# Patient Record
Sex: MALE | Race: WHITE | HISPANIC OR LATINO
[De-identification: names, ages, dates, MRNs, and addresses within clinical notes are randomized per-mention and may not be internally consistent; named-entity substitution may affect disease eponyms.]

---

## 2018-07-24 ENCOUNTER — APPOINTMENT (OUTPATIENT)
Dept: FAMILY MEDICINE | Facility: CLINIC | Age: 83
End: 2018-07-24
Payer: MEDICARE

## 2018-07-24 PROCEDURE — 99203 OFFICE O/P NEW LOW 30 MIN: CPT

## 2018-07-24 NOTE — ASSESSMENT
[FreeTextEntry1] : Patient is sent for laboratory work a chest x-ray and also Sandi given referral to Gen. surgery we will ask him to return and bring in all of his medications at that time

## 2018-07-24 NOTE — PHYSICAL EXAM
[No Acute Distress] : no acute distress [Well Nourished] : well nourished [Well Developed] : well developed [Well-Appearing] : well-appearing [Normal Sclera/Conjunctiva] : normal sclera/conjunctiva [Normal Outer Ear/Nose] : the outer ears and nose were normal in appearance [Normal Oropharynx] : the oropharynx was normal [No JVD] : no jugular venous distention [Supple] : supple [No Lymphadenopathy] : no lymphadenopathy [Thyroid Normal, No Nodules] : the thyroid was normal and there were no nodules present [No Respiratory Distress] : no respiratory distress  [No Accessory Muscle Use] : no accessory muscle use [Normal Rate] : normal rate  [Regular Rhythm] : with a regular rhythm [No Edema] : there was no peripheral edema [Soft] : abdomen soft [Non Tender] : non-tender [Non-distended] : non-distended [No HSM] : no HSM [No Hernias] : no hernias [Normal Supraclavicular Nodes] : no supraclavicular lymphadenopathy [Normal Posterior Cervical Nodes] : no posterior cervical lymphadenopathy [Normal Anterior Cervical Nodes] : no anterior cervical lymphadenopathy [No Spinal Tenderness] : no spinal tenderness [de-identified] : Rounded raised red apparent skin tumor supra-clavicular left shoulder area

## 2018-07-24 NOTE — HISTORY OF PRESENT ILLNESS
[FreeTextEntry1] : skin lesion L shoulder [de-identified] : Patient is here today because of a skin lesion of his left shoulder. He is a Citizen of [place]. 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 According to his  he takes many medications but they do not know the names and has not brought them in today. Basically he feels well and review of systems is unremarkable

## 2018-07-26 ENCOUNTER — FORM ENCOUNTER (OUTPATIENT)
Age: 83
End: 2018-07-26

## 2018-07-27 ENCOUNTER — OUTPATIENT (OUTPATIENT)
Dept: OUTPATIENT SERVICES | Facility: HOSPITAL | Age: 83
LOS: 1 days | End: 2018-07-27
Payer: COMMERCIAL

## 2018-07-27 ENCOUNTER — APPOINTMENT (OUTPATIENT)
Dept: RADIOLOGY | Facility: HOSPITAL | Age: 83
End: 2018-07-27
Payer: MEDICARE

## 2018-07-27 DIAGNOSIS — L98.9 DISORDER OF THE SKIN AND SUBCUTANEOUS TISSUE, UNSPECIFIED: ICD-10-CM

## 2018-07-27 PROCEDURE — 71046 X-RAY EXAM CHEST 2 VIEWS: CPT

## 2018-07-27 PROCEDURE — 71046 X-RAY EXAM CHEST 2 VIEWS: CPT | Mod: 26

## 2018-08-04 LAB
ALBUMIN SERPL ELPH-MCNC: 3.6 G/DL
ALP BLD-CCNC: 81 U/L
ALT SERPL-CCNC: 14 U/L
ANION GAP SERPL CALC-SCNC: 12 MMOL/L
APPEARANCE: CLEAR
AST SERPL-CCNC: 23 U/L
BASOPHILS # BLD AUTO: 0.07 K/UL
BASOPHILS NFR BLD AUTO: 1.1 %
BILIRUB SERPL-MCNC: 0.3 MG/DL
BILIRUBIN URINE: NEGATIVE
BLOOD URINE: NEGATIVE
BUN SERPL-MCNC: 21 MG/DL
CALCIUM SERPL-MCNC: 9 MG/DL
CHLORIDE SERPL-SCNC: 103 MMOL/L
CHOLEST SERPL-MCNC: 198 MG/DL
CHOLEST/HDLC SERPL: 3.9 RATIO
CO2 SERPL-SCNC: 29 MMOL/L
COLOR: YELLOW
CREAT SERPL-MCNC: 0.93 MG/DL
EOSINOPHIL # BLD AUTO: 0.2 K/UL
EOSINOPHIL NFR BLD AUTO: 3.2 %
GLUCOSE QUALITATIVE U: NEGATIVE MG/DL
GLUCOSE SERPL-MCNC: 94 MG/DL
HCT VFR BLD CALC: 41.6 %
HDLC SERPL-MCNC: 51 MG/DL
HGB BLD-MCNC: 13.4 G/DL
IMM GRANULOCYTES NFR BLD AUTO: 0.2 %
KETONES URINE: NEGATIVE
LDLC SERPL CALC-MCNC: 126 MG/DL
LEUKOCYTE ESTERASE URINE: NEGATIVE
LYMPHOCYTES # BLD AUTO: 2.71 K/UL
LYMPHOCYTES NFR BLD AUTO: 42.9 %
MAN DIFF?: NORMAL
MCHC RBC-ENTMCNC: 29.8 PG
MCHC RBC-ENTMCNC: 32.2 GM/DL
MCV RBC AUTO: 92.4 FL
MONOCYTES # BLD AUTO: 0.65 K/UL
MONOCYTES NFR BLD AUTO: 10.3 %
NEUTROPHILS # BLD AUTO: 2.67 K/UL
NEUTROPHILS NFR BLD AUTO: 42.3 %
NITRITE URINE: NEGATIVE
PH URINE: 5.5
PLATELET # BLD AUTO: 250 K/UL
POTASSIUM SERPL-SCNC: 4.4 MMOL/L
PROT SERPL-MCNC: 7.4 G/DL
PROTEIN URINE: NEGATIVE MG/DL
RBC # BLD: 4.5 M/UL
RBC # FLD: 13.1 %
SODIUM SERPL-SCNC: 144 MMOL/L
SPECIFIC GRAVITY URINE: 1.03
TRIGL SERPL-MCNC: 105 MG/DL
UROBILINOGEN URINE: NEGATIVE MG/DL
WBC # FLD AUTO: 6.31 K/UL

## 2018-08-08 ENCOUNTER — APPOINTMENT (OUTPATIENT)
Dept: SURGERY | Facility: CLINIC | Age: 83
End: 2018-08-08
Payer: MEDICARE

## 2018-08-08 VITALS — WEIGHT: 134 LBS | HEIGHT: 62 IN | BODY MASS INDEX: 24.66 KG/M2

## 2018-08-08 PROCEDURE — 99202 OFFICE O/P NEW SF 15 MIN: CPT

## 2018-08-13 ENCOUNTER — APPOINTMENT (OUTPATIENT)
Dept: SURGERY | Facility: CLINIC | Age: 83
End: 2018-08-13
Payer: MEDICARE

## 2018-08-13 ENCOUNTER — LABORATORY RESULT (OUTPATIENT)
Age: 83
End: 2018-08-13

## 2018-08-13 PROCEDURE — 11602 EXC TR-EXT MAL+MARG 1.1-2 CM: CPT

## 2018-08-14 ENCOUNTER — APPOINTMENT (OUTPATIENT)
Dept: FAMILY MEDICINE | Facility: CLINIC | Age: 83
End: 2018-08-14
Payer: MEDICARE

## 2018-08-14 VITALS
HEART RATE: 67 BPM | DIASTOLIC BLOOD PRESSURE: 83 MMHG | BODY MASS INDEX: 23.37 KG/M2 | WEIGHT: 127 LBS | HEIGHT: 62 IN | SYSTOLIC BLOOD PRESSURE: 155 MMHG | OXYGEN SATURATION: 96 % | RESPIRATION RATE: 12 BRPM

## 2018-08-14 PROCEDURE — 99212 OFFICE O/P EST SF 10 MIN: CPT

## 2018-08-14 NOTE — PHYSICAL EXAM
[No Acute Distress] : no acute distress [Well Nourished] : well nourished [Well Developed] : well developed [Well-Appearing] : well-appearing [No Respiratory Distress] : no respiratory distress  [Clear to Auscultation] : lungs were clear to auscultation bilaterally [Normal Rate] : normal rate  [Regular Rhythm] : with a regular rhythm [No Edema] : there was no peripheral edema [de-identified] : biopsy site healing well

## 2018-08-14 NOTE — HISTORY OF PRESENT ILLNESS
[FreeTextEntry1] : skin lesion L shoulder [de-identified] : He is here today in followup on a skin lesion on the left shoulder this was biopsied day yesterday by Dr. Combs with the results pending in the next several days patient has had no fever chills or systemic symptoms and no major pain in the area during work that was done previously was well within acceptable levels review of systems is unremarkable

## 2018-08-17 ENCOUNTER — APPOINTMENT (OUTPATIENT)
Dept: SURGERY | Facility: CLINIC | Age: 83
End: 2018-08-17
Payer: MEDICARE

## 2018-08-17 PROCEDURE — 99024 POSTOP FOLLOW-UP VISIT: CPT

## 2018-08-27 ENCOUNTER — OUTPATIENT (OUTPATIENT)
Dept: OUTPATIENT SERVICES | Facility: HOSPITAL | Age: 83
LOS: 1 days | End: 2018-08-27
Payer: COMMERCIAL

## 2018-08-27 VITALS
WEIGHT: 124.78 LBS | TEMPERATURE: 98 F | OXYGEN SATURATION: 95 % | RESPIRATION RATE: 16 BRPM | DIASTOLIC BLOOD PRESSURE: 68 MMHG | SYSTOLIC BLOOD PRESSURE: 151 MMHG | HEIGHT: 61 IN | HEART RATE: 65 BPM

## 2018-08-27 VITALS — WEIGHT: 124.78 LBS | HEIGHT: 61 IN

## 2018-08-27 DIAGNOSIS — Z01.818 ENCOUNTER FOR OTHER PREPROCEDURAL EXAMINATION: ICD-10-CM

## 2018-08-27 DIAGNOSIS — G89.3 NEOPLASM RELATED PAIN (ACUTE) (CHRONIC): ICD-10-CM

## 2018-08-27 DIAGNOSIS — C44.91 BASAL CELL CARCINOMA OF SKIN, UNSPECIFIED: ICD-10-CM

## 2018-08-27 DIAGNOSIS — Z98.890 OTHER SPECIFIED POSTPROCEDURAL STATES: Chronic | ICD-10-CM

## 2018-08-27 DIAGNOSIS — D49.2 NEOPLASM OF UNSPECIFIED BEHAVIOR OF BONE, SOFT TISSUE, AND SKIN: ICD-10-CM

## 2018-08-27 PROCEDURE — G0463: CPT

## 2018-08-27 PROCEDURE — 93010 ELECTROCARDIOGRAM REPORT: CPT | Mod: NC

## 2018-08-27 PROCEDURE — 93005 ELECTROCARDIOGRAM TRACING: CPT

## 2018-08-27 NOTE — H&P PST ADULT - PROBLEM SELECTOR PLAN 1
wide excision basal cell carcinoma left shoulder. medical clearance requested. pepcid and surgical wash instructions reviewed and verbalized understanding. stop MVI

## 2018-08-27 NOTE — H&P PST ADULT - NSANTHOSAYNRD_GEN_A_CORE
neck 15.5 inches neck 15.5 inches/No. ELÍAS screening performed.  STOP BANG Legend: 0-2 = LOW Risk; 3-4 = INTERMEDIATE Risk; 5-8 = HIGH Risk

## 2018-08-27 NOTE — H&P PST ADULT - RS GEN PE MLT RESP DETAILS PC
respirations non-labored/airway patent/good air movement/no chest wall tenderness/no wheezes/breath sounds equal/no rales/no rhonchi/clear to auscultation bilaterally

## 2018-08-27 NOTE — H&P PST ADULT - HISTORY OF PRESENT ILLNESS
85 yo male presents 85 yo male presents with 1.5 month history of left shoulder lesion which was biopsied 1 week ago and revealed basal cell carcinoma. Denies pain but reports minimal serous drainage.

## 2018-08-27 NOTE — H&P PST ADULT - PMH
Basal cell carcinoma  left shoulder Anxiety    Basal cell carcinoma  left shoulder  BPH (benign prostatic hyperplasia)    Elevated blood sugar  secondary to steroids  Incontinence    Language barrier    Memory loss, short term    Nocturia  x1-2  Pancreatic mass  benign 2011  Pancreatitis  2011  Poor historian  both patient and wife

## 2018-08-29 ENCOUNTER — APPOINTMENT (OUTPATIENT)
Dept: FAMILY MEDICINE | Facility: CLINIC | Age: 83
End: 2018-08-29

## 2018-08-29 PROBLEM — K85.90 ACUTE PANCREATITIS WITHOUT NECROSIS OR INFECTION, UNSPECIFIED: Chronic | Status: ACTIVE | Noted: 2018-08-27

## 2018-08-29 PROBLEM — F41.9 ANXIETY DISORDER, UNSPECIFIED: Chronic | Status: ACTIVE | Noted: 2018-08-27

## 2018-08-29 PROBLEM — C44.91 BASAL CELL CARCINOMA OF SKIN, UNSPECIFIED: Chronic | Status: ACTIVE | Noted: 2018-08-27

## 2018-08-29 PROBLEM — R32 UNSPECIFIED URINARY INCONTINENCE: Chronic | Status: ACTIVE | Noted: 2018-08-27

## 2018-08-29 PROBLEM — R35.1 NOCTURIA: Chronic | Status: ACTIVE | Noted: 2018-08-27

## 2018-08-29 PROBLEM — K86.9 DISEASE OF PANCREAS, UNSPECIFIED: Chronic | Status: ACTIVE | Noted: 2018-08-27

## 2018-08-29 PROBLEM — R41.3 OTHER AMNESIA: Chronic | Status: ACTIVE | Noted: 2018-08-27

## 2018-08-29 PROBLEM — Z78.9 OTHER SPECIFIED HEALTH STATUS: Chronic | Status: ACTIVE | Noted: 2018-08-27

## 2018-08-29 PROBLEM — R73.9 HYPERGLYCEMIA, UNSPECIFIED: Chronic | Status: ACTIVE | Noted: 2018-08-27

## 2018-08-29 PROBLEM — N40.0 BENIGN PROSTATIC HYPERPLASIA WITHOUT LOWER URINARY TRACT SYMPTOMS: Chronic | Status: ACTIVE | Noted: 2018-08-27

## 2018-09-04 ENCOUNTER — APPOINTMENT (OUTPATIENT)
Dept: FAMILY MEDICINE | Facility: CLINIC | Age: 83
End: 2018-09-04
Payer: MEDICARE

## 2018-09-04 VITALS
OXYGEN SATURATION: 97 % | WEIGHT: 126 LBS | HEART RATE: 76 BPM | BODY MASS INDEX: 23.19 KG/M2 | DIASTOLIC BLOOD PRESSURE: 70 MMHG | RESPIRATION RATE: 12 BRPM | SYSTOLIC BLOOD PRESSURE: 143 MMHG | HEIGHT: 62 IN

## 2018-09-04 DIAGNOSIS — Z84.89 FAMILY HISTORY OF OTHER SPECIFIED CONDITIONS: ICD-10-CM

## 2018-09-04 PROCEDURE — 99214 OFFICE O/P EST MOD 30 MIN: CPT

## 2018-09-04 NOTE — PHYSICAL EXAM
[No Acute Distress] : no acute distress [Well Nourished] : well nourished [Well Developed] : well developed [Well-Appearing] : well-appearing [Normal Sclera/Conjunctiva] : normal sclera/conjunctiva [PERRL] : pupils equal round and reactive to light [EOMI] : extraocular movements intact [Normal Outer Ear/Nose] : the outer ears and nose were normal in appearance [Normal Oropharynx] : the oropharynx was normal [No JVD] : no jugular venous distention [Supple] : supple [No Lymphadenopathy] : no lymphadenopathy [Thyroid Normal, No Nodules] : the thyroid was normal and there were no nodules present [No Respiratory Distress] : no respiratory distress  [Clear to Auscultation] : lungs were clear to auscultation bilaterally [No Accessory Muscle Use] : no accessory muscle use [Normal Rate] : normal rate  [Regular Rhythm] : with a regular rhythm [No Murmur] : no murmur heard [No Edema] : there was no peripheral edema [Soft] : abdomen soft [Non Tender] : non-tender [Non-distended] : non-distended [No Masses] : no abdominal mass palpated [No HSM] : no HSM [Normal Bowel Sounds] : normal bowel sounds [Normal Supraclavicular Nodes] : no supraclavicular lymphadenopathy [Normal Posterior Cervical Nodes] : no posterior cervical lymphadenopathy [Normal Anterior Cervical Nodes] : no anterior cervical lymphadenopathy [No CVA Tenderness] : no CVA  tenderness [No Spinal Tenderness] : no spinal tenderness [No Joint Swelling] : no joint swelling [de-identified] : He continues to areas of rash one below the left nipple somewhat elongated and streaky with several small vesicles/pustules. This is nonpainful. Patient also has a proximately 5 cm diameter black she is slightly red area and is left on his left posterior thigh this is not painful or warm. E. chest lesion could be very limited early zoster although he can not in any way be sure there is in the blotch on the thigh could be erythema chronicum migrans although is not typical but does not have central clearing and there is no history of a tick bite or a tick being removed from the patient although he does work in his yard.

## 2018-09-04 NOTE — HISTORY OF PRESENT ILLNESS
[No Pertinent Cardiac History] : no history of aortic stenosis, atrial fibrillation, coronary artery disease, recent myocardial infarction, or implantable device/pacemaker [No Pertinent Pulmonary History] : no history of asthma, COPD, sleep apnea, or smoking [No Adverse Anesthesia Reaction] : no adverse anesthesia reaction in self or family member [Chronic Anticoagulation] : no chronic anticoagulation [Chronic Kidney Disease] : no chronic kidney disease [Diabetes] : no diabetes [(Patient denies any chest pain, claudication, dyspnea on exertion, orthopnea, palpitations or syncope)] : Patient denies any chest pain, claudication, dyspnea on exertion, orthopnea, palpitations or syncope [Moderate (4-6 METs)] : Moderate (4-6 METs) [FreeTextEntry1] : wide excision basal cell [FreeTextEntry2] : 9/6/2018 [FreeTextEntry3] : Garret [FreeTextEntry4] : Patient is here for preop evaluation for a wide excision of a previously biopsied basal cell carcinoma on his left shoulder. The patient's past medical history is unremarkable except for pancreatitis for which he underwent endoscopy otherwise he's had no surgeries recently he has felt well review of systems being negative for chest pain shortness of breath palpitations lightheadedness fainting spells abdominal pain etc. however the patient did contract a red on the left side of his chest about 4 cm in length with several vesicles/pustules. He also has a red blotch on the right lateral and posterior thigh about 7 cm in diameter none of these lesions are painful. There have been no history of removal of ticks or tick bites although the patient has mild his lawn in the past and wonders  if he might have mild poison ivy. No fever chills no generalized aches or pains. [FreeTextEntry7] : EKG- non specific T wave abnormality. Left Axis deviation. No acute ST elevation or depression. pt has no cardiac symptoms

## 2018-09-04 NOTE — ASSESSMENT
[High Risk Surgery - Intraperitoneal, Intrathoracic or Supringuinal Vascular Procedures] : High Risk Surgery - Intraperitoneal, Intrathoracic or Supringuinal Vascular Procedures - No (0) [Ischemic Heart Disease] : Ischemic Heart Disease - No (0) [Congestive Heart Failure] : Congestive Heart Failure - No (0) [Prior Cerebrovascular Accident or TIA] : Prior Cerebrovascular Accident or TIA - No (0) [Creatinine >= 2mg/dL (1 Point)] : Creatinine >= 2mg/dL - No (0) [Insulin-dependent Diabetic (1 Point)] : Insulin-dependent Diabetic - No (0) [0] : 0 , RCRI Class: I, Risk of Post-Op Cardiac Complications: 0.4%, Procedure Risk: Low-Risk [No Further Testing Recommended] : no further testing recommended [FreeTextEntry4] : From a cardiopulmonary point of view the patient is an acceptable candidate for proposed skin excision however the proximity of the chest rash and possible infection to the area of surgery will be relayed to the surgeon patient will be reevaluated with regard to the skin rash which is likely a contact dermatitis but has a large differential. Review of laboratory work and EKG reveals no contraindication to surgery and no need for further evaluation prior to surgery [FreeTextEntry2] : na

## 2018-09-05 ENCOUNTER — TRANSCRIPTION ENCOUNTER (OUTPATIENT)
Age: 83
End: 2018-09-05

## 2018-09-05 ENCOUNTER — APPOINTMENT (OUTPATIENT)
Dept: SURGERY | Facility: CLINIC | Age: 83
End: 2018-09-05
Payer: MEDICARE

## 2018-09-05 DIAGNOSIS — L98.9 DISORDER OF THE SKIN AND SUBCUTANEOUS TISSUE, UNSPECIFIED: ICD-10-CM

## 2018-09-05 PROCEDURE — 99213 OFFICE O/P EST LOW 20 MIN: CPT | Mod: 24,57

## 2018-09-06 ENCOUNTER — RESULT REVIEW (OUTPATIENT)
Age: 83
End: 2018-09-06

## 2018-09-06 ENCOUNTER — OUTPATIENT (OUTPATIENT)
Dept: OUTPATIENT SERVICES | Facility: HOSPITAL | Age: 83
LOS: 1 days | End: 2018-09-06
Payer: COMMERCIAL

## 2018-09-06 VITALS
RESPIRATION RATE: 16 BRPM | TEMPERATURE: 98 F | SYSTOLIC BLOOD PRESSURE: 162 MMHG | HEIGHT: 61 IN | HEART RATE: 68 BPM | WEIGHT: 124.78 LBS | OXYGEN SATURATION: 96 % | DIASTOLIC BLOOD PRESSURE: 71 MMHG

## 2018-09-06 VITALS
TEMPERATURE: 98 F | HEART RATE: 81 BPM | DIASTOLIC BLOOD PRESSURE: 73 MMHG | OXYGEN SATURATION: 96 % | RESPIRATION RATE: 14 BRPM | SYSTOLIC BLOOD PRESSURE: 133 MMHG

## 2018-09-06 DIAGNOSIS — Z98.890 OTHER SPECIFIED POSTPROCEDURAL STATES: Chronic | ICD-10-CM

## 2018-09-06 DIAGNOSIS — G89.3 NEOPLASM RELATED PAIN (ACUTE) (CHRONIC): ICD-10-CM

## 2018-09-06 DIAGNOSIS — D49.2 NEOPLASM OF UNSPECIFIED BEHAVIOR OF BONE, SOFT TISSUE, AND SKIN: ICD-10-CM

## 2018-09-06 PROBLEM — L98.9 SKIN LESION: Status: ACTIVE | Noted: 2018-07-24

## 2018-09-06 PROCEDURE — 88305 TISSUE EXAM BY PATHOLOGIST: CPT | Mod: 26

## 2018-09-06 PROCEDURE — 88305 TISSUE EXAM BY PATHOLOGIST: CPT

## 2018-09-06 PROCEDURE — 11606 EXC TR-EXT MAL+MARG >4 CM: CPT | Mod: LT

## 2018-09-06 PROCEDURE — ZZZZZ: CPT

## 2018-09-06 PROCEDURE — 11604 EXC TR-EXT MAL+MARG 3.1-4 CM: CPT

## 2018-09-06 PROCEDURE — 12042 INTMD RPR N-HF/GENIT2.6-7.5: CPT

## 2018-09-06 RX ORDER — SODIUM CHLORIDE 9 MG/ML
1000 INJECTION, SOLUTION INTRAVENOUS
Qty: 0 | Refills: 0 | Status: DISCONTINUED | OUTPATIENT
Start: 2018-09-06 | End: 2018-09-06

## 2018-09-06 RX ORDER — ACETAMINOPHEN 500 MG
650 TABLET ORAL EVERY 6 HOURS
Qty: 0 | Refills: 0 | Status: DISCONTINUED | OUTPATIENT
Start: 2018-09-06 | End: 2018-09-21

## 2018-09-06 RX ORDER — HYDROMORPHONE HYDROCHLORIDE 2 MG/ML
0.5 INJECTION INTRAMUSCULAR; INTRAVENOUS; SUBCUTANEOUS
Qty: 0 | Refills: 0 | Status: DISCONTINUED | OUTPATIENT
Start: 2018-09-06 | End: 2018-09-06

## 2018-09-06 RX ORDER — ONDANSETRON 8 MG/1
4 TABLET, FILM COATED ORAL ONCE
Qty: 0 | Refills: 0 | Status: DISCONTINUED | OUTPATIENT
Start: 2018-09-06 | End: 2018-09-06

## 2018-09-06 RX ORDER — OXYCODONE AND ACETAMINOPHEN 5; 325 MG/1; MG/1
1 TABLET ORAL EVERY 6 HOURS
Qty: 0 | Refills: 0 | Status: DISCONTINUED | OUTPATIENT
Start: 2018-09-06 | End: 2018-09-06

## 2018-09-06 NOTE — ASU PATIENT PROFILE, ADULT - PMH
Anxiety    Basal cell carcinoma  left shoulder  BPH (benign prostatic hyperplasia)    Elevated blood sugar  secondary to steroids  Incontinence    Language barrier    Memory loss, short term    Nocturia  x1-2  Pancreatic mass  benign 2011  Pancreatitis  2011  Poor historian  both patient and wife

## 2018-09-06 NOTE — ASU DISCHARGE PLAN (ADULT/PEDIATRIC). - ITEMS TO FOLLOWUP WITH YOUR PHYSICIAN'S
see above physician notification. reviewed with patient and spouse verbalizes understanding of above information.

## 2018-09-06 NOTE — BRIEF OPERATIVE NOTE - PROCEDURE
<<-----Click on this checkbox to enter Procedure Excision  09/06/2018  basal cell carcinoma left post shoulder  Active  [facility]

## 2018-09-06 NOTE — ASU DISCHARGE PLAN (ADULT/PEDIATRIC). - MEDICATION SUMMARY - MEDICATIONS TO TAKE
I will START or STAY ON the medications listed below when I get home from the hospital:    predniSONE 2.5 mg oral tablet  -- 1 tab(s) by mouth once a day  -- Indication: For home    Percocet 5/325 oral tablet  -- 1 tab(s) by mouth every 8 hours -for moderate pain - for severe pain MDD:3   -- Caution federal law prohibits the transfer of this drug to any person other  than the person for whom it was prescribed.  May cause drowsiness.  Alcohol may intensify this effect.  Use care when operating dangerous machinery.  This prescription cannot be refilled.  This product contains acetaminophen.  Do not use  with any other product containing acetaminophen to prevent possible liver damage.  Using more of this medication than prescribed may cause serious breathing problems.    -- Indication: For post op pain    sertraline 25 mg oral tablet  -- 1/2 tab(s) by mouth once a day  -- Indication: For home    Creon 24,000 units oral delayed release capsule  -- 3 cap(s) by mouth 3 times a day  -- Indication: For home    Multiple Vitamins oral tablet  -- 1 tab(s) by mouth once a day  -- Indication: For home    Calcium 600+D oral tablet  -- 1 tab(s) by mouth once a day  -- Indication: For home

## 2018-09-06 NOTE — ASU DISCHARGE PLAN (ADULT/PEDIATRIC). - NOTIFY
Fever greater than 101/Bleeding that does not stop Persistent Nausea and Vomiting/Pain not relieved by Medications/Fever greater than 101/Bleeding that does not stop/Inability to Tolerate Liquids or Foods

## 2018-09-10 LAB — SURGICAL PATHOLOGY STUDY: SIGNIFICANT CHANGE UP

## 2018-09-12 ENCOUNTER — APPOINTMENT (OUTPATIENT)
Dept: SURGERY | Facility: CLINIC | Age: 83
End: 2018-09-12
Payer: MEDICARE

## 2018-09-12 DIAGNOSIS — C44.91 BASAL CELL CARCINOMA OF SKIN, UNSPECIFIED: ICD-10-CM

## 2018-09-12 PROCEDURE — 99024 POSTOP FOLLOW-UP VISIT: CPT

## 2018-09-12 RX ORDER — MUPIROCIN 20 MG/G
2 OINTMENT TOPICAL
Qty: 22 | Refills: 0 | Status: ACTIVE | COMMUNITY
Start: 2018-07-13

## 2018-10-09 ENCOUNTER — APPOINTMENT (OUTPATIENT)
Dept: FAMILY MEDICINE | Facility: CLINIC | Age: 83
End: 2018-10-09

## 2018-11-06 ENCOUNTER — APPOINTMENT (OUTPATIENT)
Dept: FAMILY MEDICINE | Facility: CLINIC | Age: 83
End: 2018-11-06
Payer: MEDICARE

## 2018-11-06 VITALS
BODY MASS INDEX: 26.13 KG/M2 | TEMPERATURE: 98.3 F | HEART RATE: 69 BPM | RESPIRATION RATE: 12 BRPM | DIASTOLIC BLOOD PRESSURE: 49 MMHG | OXYGEN SATURATION: 98 % | HEIGHT: 62 IN | WEIGHT: 142 LBS | SYSTOLIC BLOOD PRESSURE: 149 MMHG

## 2018-11-06 DIAGNOSIS — Z23 ENCOUNTER FOR IMMUNIZATION: ICD-10-CM

## 2018-11-06 LAB
ALBUMIN SERPL ELPH-MCNC: 3.9 G/DL
ALP BLD-CCNC: 84 U/L
ALT SERPL-CCNC: 18 U/L
ANION GAP SERPL CALC-SCNC: 11 MMOL/L
AST SERPL-CCNC: 21 U/L
BASOPHILS # BLD AUTO: 0.04 K/UL
BASOPHILS NFR BLD AUTO: 0.7 %
BILIRUB SERPL-MCNC: 0.3 MG/DL
BUN SERPL-MCNC: 28 MG/DL
CALCIUM SERPL-MCNC: 9 MG/DL
CHLORIDE SERPL-SCNC: 105 MMOL/L
CO2 SERPL-SCNC: 27 MMOL/L
CREAT SERPL-MCNC: 1.08 MG/DL
EOSINOPHIL # BLD AUTO: 0.19 K/UL
EOSINOPHIL NFR BLD AUTO: 3.1 %
ERYTHROCYTE [SEDIMENTATION RATE] IN BLOOD BY WESTERGREN METHOD: 34 MM/HR
GLUCOSE SERPL-MCNC: 88 MG/DL
HCT VFR BLD CALC: 40.3 %
HGB BLD-MCNC: 13.2 G/DL
IMM GRANULOCYTES NFR BLD AUTO: 0.2 %
LYMPHOCYTES # BLD AUTO: 2.16 K/UL
LYMPHOCYTES NFR BLD AUTO: 35.4 %
MAN DIFF?: NORMAL
MCHC RBC-ENTMCNC: 30.1 PG
MCHC RBC-ENTMCNC: 32.8 GM/DL
MCV RBC AUTO: 92 FL
MONOCYTES # BLD AUTO: 0.59 K/UL
MONOCYTES NFR BLD AUTO: 9.7 %
NEUTROPHILS # BLD AUTO: 3.11 K/UL
NEUTROPHILS NFR BLD AUTO: 50.9 %
PLATELET # BLD AUTO: 267 K/UL
POTASSIUM SERPL-SCNC: 4.8 MMOL/L
PROT SERPL-MCNC: 7 G/DL
RBC # BLD: 4.38 M/UL
RBC # FLD: 13.6 %
SODIUM SERPL-SCNC: 143 MMOL/L
WBC # FLD AUTO: 6.1 K/UL

## 2018-11-06 PROCEDURE — 99214 OFFICE O/P EST MOD 30 MIN: CPT | Mod: 25

## 2018-11-06 PROCEDURE — 90686 IIV4 VACC NO PRSV 0.5 ML IM: CPT

## 2018-11-06 PROCEDURE — G0008: CPT

## 2018-11-06 NOTE — REVIEW OF SYSTEMS
[Unsteady Walk] : ataxia [Fever] : no fever [Chills] : no chills [Fatigue] : no fatigue [Night Sweats] : no night sweats [Recent Change In Weight] : ~T no recent weight change [Discharge] : no discharge [Vision Problems] : no vision problems [Sore Throat] : no sore throat [Chest Pain] : no chest pain [Palpitations] : no palpitations [Orthopena] : no orthopnea [Paroysmal Nocturnal Dyspnea] : no paroysmal nocturnal dyspnea [Shortness Of Breath] : no shortness of breath [Wheezing] : no wheezing [Cough] : no cough [Dyspnea on Exertion] : not dyspnea on exertion [Abdominal Pain] : no abdominal pain [Nausea] : no nausea [Constipation] : no constipation [Vomiting] : no vomiting [Heartburn] : no heartburn [Melena] : no melena [Dysuria] : no dysuria [Incontinence] : no incontinence [Hesitancy] : no hesitancy [Nocturia] : no nocturia [Hematuria] : no hematuria [Frequency] : no frequency [Joint Pain] : no joint pain [Joint Stiffness] : no joint stiffness [Back Pain] : no back pain [Joint Swelling] : no joint swelling [Headache] : no headache [Dizziness] : no dizziness [Fainting] : no fainting [Confusion] : no confusion [Memory Loss] : no memory loss

## 2018-11-06 NOTE — PHYSICAL EXAM
[No Acute Distress] : no acute distress [Well Nourished] : well nourished [Well Developed] : well developed [Well-Appearing] : well-appearing [Normal Sclera/Conjunctiva] : normal sclera/conjunctiva [PERRL] : pupils equal round and reactive to light [Normal Oropharynx] : the oropharynx was normal [No JVD] : no jugular venous distention [No Lymphadenopathy] : no lymphadenopathy [Thyroid Normal, No Nodules] : the thyroid was normal and there were no nodules present [No Respiratory Distress] : no respiratory distress  [Clear to Auscultation] : lungs were clear to auscultation bilaterally [No Accessory Muscle Use] : no accessory muscle use [Normal Rate] : normal rate  [Regular Rhythm] : with a regular rhythm [No Murmur] : no murmur heard [No Abdominal Bruit] : a ~M bruit was not heard ~T in the abdomen [No Edema] : there was no peripheral edema [No Extremity Clubbing/Cyanosis] : no extremity clubbing/cyanosis [Soft] : abdomen soft [Non Tender] : non-tender [No HSM] : no HSM [Normal Bowel Sounds] : normal bowel sounds [No CVA Tenderness] : no CVA  tenderness [No Spinal Tenderness] : no spinal tenderness [de-identified] : gait slightly unsteady, get up and go normal

## 2018-11-06 NOTE — ASSESSMENT
[FreeTextEntry1] : Patient here with frequent falls and some mental status changes currently he seems alert and neurological exam has no focal deficits he has brisk reflexes and good muscle strength he ambulates reasonably well for his age he will be given a flu shot will be sent for CAT scan of the head with contrast also laboratory work to be followed up in a week to 10 days after this visit

## 2018-11-06 NOTE — HISTORY OF PRESENT ILLNESS
[FreeTextEntry1] : difficulty walking [de-identified] : He is here today because over the past month he has had several falls and he seems to have some difficulty walking also does not follow along in conversations as well as he used to he denies any headaches or weakness or slurred speech there is a past history of depression he is on Zoloft review of systems is otherwise unremarkable

## 2018-11-07 LAB
TSH SERPL-ACNC: 1.57 UIU/ML
VIT B12 SERPL-MCNC: 433 PG/ML

## 2019-04-02 ENCOUNTER — APPOINTMENT (OUTPATIENT)
Dept: FAMILY MEDICINE | Facility: CLINIC | Age: 84
End: 2019-04-02
Payer: MEDICARE

## 2019-04-02 VITALS
BODY MASS INDEX: 24.11 KG/M2 | DIASTOLIC BLOOD PRESSURE: 70 MMHG | HEIGHT: 62 IN | SYSTOLIC BLOOD PRESSURE: 126 MMHG | WEIGHT: 131 LBS | OXYGEN SATURATION: 98 % | HEART RATE: 68 BPM | RESPIRATION RATE: 12 BRPM

## 2019-04-02 PROCEDURE — 99214 OFFICE O/P EST MOD 30 MIN: CPT

## 2019-04-02 RX ORDER — CEFADROXIL 500 MG/1
500 CAPSULE ORAL
Qty: 14 | Refills: 0 | Status: DISCONTINUED | COMMUNITY
Start: 2018-07-09 | End: 2019-04-02

## 2019-04-02 RX ORDER — OXYCODONE AND ACETAMINOPHEN 5; 325 MG/1; MG/1
5-325 TABLET ORAL
Qty: 6 | Refills: 0 | Status: DISCONTINUED | COMMUNITY
Start: 2018-09-06 | End: 2019-04-02

## 2019-04-02 NOTE — REVIEW OF SYSTEMS
[Negative] : Musculoskeletal [Fever] : no fever [Chills] : no chills [Fatigue] : no fatigue [Night Sweats] : no night sweats [Recent Change In Weight] : ~T no recent weight change [Vision Problems] : no vision problems [Nasal Discharge] : no nasal discharge [Sore Throat] : no sore throat

## 2019-04-02 NOTE — HISTORY OF PRESENT ILLNESS
[FreeTextEntry8] : Patient is here due to frequent falls his wife is concerned about his vision he also has had some lessening in his memory of late he has not had any major injuries no new medications. He is systems is otherwise unremarkable

## 2019-04-02 NOTE — ASSESSMENT
[FreeTextEntry1] : No focal deficits noted, get up and go test shows mild unsteady gait patient moves about quickly. No obvious neurological issue vision check reveals 20/40 both eyes. Reason for falls maybe multifactorial patient is 85 years of age he will be sent for an ophthalmology evaluation also for a CT of the head he will followup here after those results are available to us recent laboratory several months ago was within normal limits including a vitamin B12 levels patient follows up with GI for pancreatic insufficiency

## 2019-04-02 NOTE — PHYSICAL EXAM
[No Acute Distress] : no acute distress [Well Nourished] : well nourished [Well Developed] : well developed [Well-Appearing] : well-appearing [Normal Sclera/Conjunctiva] : normal sclera/conjunctiva [PERRL] : pupils equal round and reactive to light [Normal Outer Ear/Nose] : the outer ears and nose were normal in appearance [Normal Oropharynx] : the oropharynx was normal [No JVD] : no jugular venous distention [Supple] : supple [No Respiratory Distress] : no respiratory distress  [Clear to Auscultation] : lungs were clear to auscultation bilaterally [No Accessory Muscle Use] : no accessory muscle use [Normal Rate] : normal rate  [Regular Rhythm] : with a regular rhythm [No Murmur] : no murmur heard [No Edema] : there was no peripheral edema [No CVA Tenderness] : no CVA  tenderness [No Spinal Tenderness] : no spinal tenderness [No Joint Swelling] : no joint swelling [Grossly Normal Strength/Tone] : grossly normal strength/tone

## 2019-04-03 ENCOUNTER — LABORATORY RESULT (OUTPATIENT)
Age: 84
End: 2019-04-03

## 2019-07-22 ENCOUNTER — APPOINTMENT (OUTPATIENT)
Dept: SURGERY | Facility: CLINIC | Age: 84
End: 2019-07-22

## 2019-07-30 ENCOUNTER — APPOINTMENT (OUTPATIENT)
Dept: FAMILY MEDICINE | Facility: CLINIC | Age: 84
End: 2019-07-30
Payer: MEDICARE

## 2019-07-30 PROCEDURE — 99213 OFFICE O/P EST LOW 20 MIN: CPT

## 2019-07-30 NOTE — PHYSICAL EXAM
[Well Nourished] : well nourished [No Acute Distress] : no acute distress [Normal Sclera/Conjunctiva] : normal sclera/conjunctiva [No Lymphadenopathy] : no lymphadenopathy [No JVD] : no jugular venous distention [Normal Oropharynx] : the oropharynx was normal [No Respiratory Distress] : no respiratory distress  [Clear to Auscultation] : lungs were clear to auscultation bilaterally [Normal Rate] : normal rate  [Non Tender] : non-tender [Soft] : abdomen soft [Regular Rhythm] : with a regular rhythm [No HSM] : no HSM [Normal Bowel Sounds] : normal bowel sounds [No CVA Tenderness] : no CVA  tenderness [de-identified] : gait unsteady [Coordination Grossly Intact] : coordination grossly intact

## 2019-08-05 LAB
ALBUMIN SERPL ELPH-MCNC: 3.8 G/DL
ALP BLD-CCNC: 91 U/L
ALT SERPL-CCNC: 13 U/L
ANION GAP SERPL CALC-SCNC: 12 MMOL/L
APPEARANCE: CLEAR
AST SERPL-CCNC: 20 U/L
BASOPHILS # BLD AUTO: 0.04 K/UL
BASOPHILS NFR BLD AUTO: 0.7 %
BILIRUB SERPL-MCNC: 0.3 MG/DL
BILIRUBIN URINE: NEGATIVE
BLOOD URINE: NEGATIVE
BUN SERPL-MCNC: 24 MG/DL
CALCIUM SERPL-MCNC: 8.8 MG/DL
CHLORIDE SERPL-SCNC: 106 MMOL/L
CHOLEST SERPL-MCNC: 176 MG/DL
CHOLEST/HDLC SERPL: 3.9 RATIO
CO2 SERPL-SCNC: 26 MMOL/L
COLOR: YELLOW
CREAT SERPL-MCNC: 1.07 MG/DL
EOSINOPHIL # BLD AUTO: 0.19 K/UL
EOSINOPHIL NFR BLD AUTO: 3.2 %
GLUCOSE QUALITATIVE U: NEGATIVE
GLUCOSE SERPL-MCNC: 97 MG/DL
HCT VFR BLD CALC: 41.4 %
HDLC SERPL-MCNC: 45 MG/DL
HGB BLD-MCNC: 13 G/DL
IMM GRANULOCYTES NFR BLD AUTO: 0.2 %
KETONES URINE: NEGATIVE
LDLC SERPL CALC-MCNC: 117 MG/DL
LEUKOCYTE ESTERASE URINE: NEGATIVE
LYMPHOCYTES # BLD AUTO: 1.76 K/UL
LYMPHOCYTES NFR BLD AUTO: 29.6 %
MAN DIFF?: NORMAL
MCHC RBC-ENTMCNC: 29.5 PG
MCHC RBC-ENTMCNC: 31.4 GM/DL
MCV RBC AUTO: 93.9 FL
MONOCYTES # BLD AUTO: 0.62 K/UL
MONOCYTES NFR BLD AUTO: 10.4 %
NEUTROPHILS # BLD AUTO: 3.32 K/UL
NEUTROPHILS NFR BLD AUTO: 55.9 %
NITRITE URINE: NEGATIVE
PH URINE: 5.5
PLATELET # BLD AUTO: 248 K/UL
POTASSIUM SERPL-SCNC: 4.4 MMOL/L
PROT SERPL-MCNC: 6.8 G/DL
PROTEIN URINE: NORMAL
RBC # BLD: 4.41 M/UL
RBC # FLD: 13.2 %
SODIUM SERPL-SCNC: 144 MMOL/L
SPECIFIC GRAVITY URINE: 1.03
TRIGL SERPL-MCNC: 69 MG/DL
TSH SERPL-ACNC: 2.67 UIU/ML
UROBILINOGEN URINE: NORMAL
VIT B12 SERPL-MCNC: 509 PG/ML
WBC # FLD AUTO: 5.94 K/UL

## 2019-08-27 ENCOUNTER — FORM ENCOUNTER (OUTPATIENT)
Age: 84
End: 2019-08-27

## 2019-08-28 ENCOUNTER — APPOINTMENT (OUTPATIENT)
Dept: CT IMAGING | Facility: HOSPITAL | Age: 84
End: 2019-08-28
Payer: MEDICARE

## 2019-08-28 ENCOUNTER — OUTPATIENT (OUTPATIENT)
Dept: OUTPATIENT SERVICES | Facility: HOSPITAL | Age: 84
LOS: 1 days | End: 2019-08-28
Payer: COMMERCIAL

## 2019-08-28 DIAGNOSIS — Z98.890 OTHER SPECIFIED POSTPROCEDURAL STATES: Chronic | ICD-10-CM

## 2019-08-28 DIAGNOSIS — R41.3 OTHER AMNESIA: ICD-10-CM

## 2019-08-28 PROCEDURE — 70450 CT HEAD/BRAIN W/O DYE: CPT

## 2019-08-28 PROCEDURE — 70450 CT HEAD/BRAIN W/O DYE: CPT | Mod: 26

## 2019-09-03 ENCOUNTER — APPOINTMENT (OUTPATIENT)
Dept: FAMILY MEDICINE | Facility: CLINIC | Age: 84
End: 2019-09-03
Payer: MEDICARE

## 2019-09-03 VITALS
WEIGHT: 130 LBS | DIASTOLIC BLOOD PRESSURE: 69 MMHG | HEIGHT: 62 IN | OXYGEN SATURATION: 97 % | SYSTOLIC BLOOD PRESSURE: 122 MMHG | HEART RATE: 67 BPM | BODY MASS INDEX: 23.92 KG/M2 | RESPIRATION RATE: 12 BRPM

## 2019-09-03 PROCEDURE — 99213 OFFICE O/P EST LOW 20 MIN: CPT

## 2019-09-03 NOTE — HISTORY OF PRESENT ILLNESS
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 Patient's blood sugar was in good control via systems is otherwise unremarkable he is currently alert and communicative

## 2019-09-03 NOTE — ASSESSMENT
[FreeTextEntry1] : Patient restarted on Aricept 5 mg daily he also been advised to use a cane or walker. He has been offered the possibility of neurological consultation but that has decided to wait and see how he responds to the medication followup here in 4-6 weeks

## 2019-09-03 NOTE — PHYSICAL EXAM
[No Acute Distress] : no acute distress [Well Nourished] : well nourished [Well Developed] : well developed [Normal Sclera/Conjunctiva] : normal sclera/conjunctiva [PERRL] : pupils equal round and reactive to light [Normal Oropharynx] : the oropharynx was normal [No JVD] : no jugular venous distention [No Lymphadenopathy] : no lymphadenopathy [No Respiratory Distress] : no respiratory distress  [Normal Rate] : normal rate  [Regular Rhythm] : with a regular rhythm [No Murmur] : no murmur heard [No Edema] : there was no peripheral edema [Soft] : abdomen soft [Non Tender] : non-tender [No HSM] : no HSM [Normal Bowel Sounds] : normal bowel sounds [Normal Supraclavicular Nodes] : no supraclavicular lymphadenopathy [Normal Anterior Cervical Nodes] : no anterior cervical lymphadenopathy [de-identified] : Slightly unstable gait

## 2019-10-08 ENCOUNTER — APPOINTMENT (OUTPATIENT)
Dept: FAMILY MEDICINE | Facility: CLINIC | Age: 84
End: 2019-10-08
Payer: MEDICARE

## 2019-10-08 VITALS
RESPIRATION RATE: 12 BRPM | WEIGHT: 131 LBS | DIASTOLIC BLOOD PRESSURE: 70 MMHG | OXYGEN SATURATION: 96 % | BODY MASS INDEX: 24.11 KG/M2 | HEIGHT: 62 IN | SYSTOLIC BLOOD PRESSURE: 125 MMHG | HEART RATE: 72 BPM

## 2019-10-08 DIAGNOSIS — R41.3 OTHER AMNESIA: ICD-10-CM

## 2019-10-08 PROCEDURE — 99213 OFFICE O/P EST LOW 20 MIN: CPT

## 2019-10-08 NOTE — HISTORY OF PRESENT ILLNESS
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

## 2019-10-08 NOTE — PHYSICAL EXAM
[No Acute Distress] : no acute distress [Well Nourished] : well nourished [Well-Appearing] : well-appearing [PERRL] : pupils equal round and reactive to light [Normal Sclera/Conjunctiva] : normal sclera/conjunctiva [No JVD] : no jugular venous distention [Normal Oropharynx] : the oropharynx was normal [No Respiratory Distress] : no respiratory distress  [No Lymphadenopathy] : no lymphadenopathy [No Accessory Muscle Use] : no accessory muscle use [Normal Rate] : normal rate  [Regular Rhythm] : with a regular rhythm [No Murmur] : no murmur heard [Soft] : abdomen soft [No Edema] : there was no peripheral edema [No HSM] : no HSM [Non Tender] : non-tender [Normal Supraclavicular Nodes] : no supraclavicular lymphadenopathy [Normal Posterior Cervical Nodes] : no posterior cervical lymphadenopathy [Normal Anterior Cervical Nodes] : no anterior cervical lymphadenopathy [No CVA Tenderness] : no CVA  tenderness [No Spinal Tenderness] : no spinal tenderness [No Focal Deficits] : no focal deficits [No Rash] : no rash [Coordination Grossly Intact] : coordination grossly intact [de-identified] : Mildly unstable gait

## 2019-10-08 NOTE — ASSESSMENT
[FreeTextEntry1] : Patient will be sent for urology and neurology consultations with regard to the incontinence and the unstable gait he'll be followed up here in one month no apparent severe injury he is ventilating reasonably well no trouble bearing weight

## 2019-10-08 NOTE — REVIEW OF SYSTEMS
[Fever] : no fever [Chills] : no chills [Fatigue] : no fatigue [Sore Throat] : no sore throat [Palpitations] : no palpitations [Chest Pain] : no chest pain [Orthopena] : no orthopnea [Shortness Of Breath] : no shortness of breath [Paroxysmal Nocturnal Dyspnea] : no paroxysmal nocturnal dyspnea [Wheezing] : no wheezing [Cough] : no cough [Abdominal Pain] : no abdominal pain [Nausea] : no nausea [Constipation] : no constipation [Diarrhea] : no diarrhea [Vomiting] : no vomiting [Heartburn] : no heartburn [Melena] : no melena [Dysuria] : no dysuria [Incontinence] : incontinence [Hesitancy] : hesitancy [Nocturia] : nocturia [Hematuria] : no hematuria [Joint Pain] : joint pain [Joint Stiffness] : joint stiffness [Back Pain] : back pain [Headache] : no headache [Dizziness] : no dizziness [Fainting] : no fainting [Confusion] : confusion [Unsteady Walk] : ataxia [Memory Loss] : memory loss

## 2020-02-28 ENCOUNTER — INPATIENT (INPATIENT)
Facility: HOSPITAL | Age: 85
LOS: 4 days | Discharge: NURSING, REHAB OR LONG-TERM CARE | DRG: 183 | End: 2020-03-04
Attending: SURGERY | Admitting: SURGERY
Payer: COMMERCIAL

## 2020-02-28 VITALS
DIASTOLIC BLOOD PRESSURE: 74 MMHG | OXYGEN SATURATION: 97 % | HEART RATE: 80 BPM | SYSTOLIC BLOOD PRESSURE: 177 MMHG | TEMPERATURE: 97 F | RESPIRATION RATE: 18 BRPM

## 2020-02-28 DIAGNOSIS — S22.39XA FRACTURE OF ONE RIB, UNSPECIFIED SIDE, INITIAL ENCOUNTER FOR CLOSED FRACTURE: ICD-10-CM

## 2020-02-28 DIAGNOSIS — F03.90 UNSPECIFIED DEMENTIA WITHOUT BEHAVIORAL DISTURBANCE: ICD-10-CM

## 2020-02-28 DIAGNOSIS — R32 UNSPECIFIED URINARY INCONTINENCE: ICD-10-CM

## 2020-02-28 DIAGNOSIS — Z29.9 ENCOUNTER FOR PROPHYLACTIC MEASURES, UNSPECIFIED: ICD-10-CM

## 2020-02-28 DIAGNOSIS — R55 SYNCOPE AND COLLAPSE: ICD-10-CM

## 2020-02-28 DIAGNOSIS — Z98.890 OTHER SPECIFIED POSTPROCEDURAL STATES: Chronic | ICD-10-CM

## 2020-02-28 DIAGNOSIS — S42.009A FRACTURE OF UNSPECIFIED PART OF UNSPECIFIED CLAVICLE, INITIAL ENCOUNTER FOR CLOSED FRACTURE: ICD-10-CM
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
## 2020-02-28 LAB
ALBUMIN SERPL ELPH-MCNC: 3.6 G/DL — SIGNIFICANT CHANGE UP (ref 3.3–5)
ALP SERPL-CCNC: 78 U/L — SIGNIFICANT CHANGE UP (ref 40–120)
ALT FLD-CCNC: 14 U/L — SIGNIFICANT CHANGE UP (ref 10–45)
ANION GAP SERPL CALC-SCNC: 12 MMOL/L — SIGNIFICANT CHANGE UP (ref 5–17)
APTT BLD: 26.9 SEC — LOW (ref 27.5–36.3)
AST SERPL-CCNC: 17 U/L — SIGNIFICANT CHANGE UP (ref 10–40)
BASOPHILS # BLD AUTO: 0.04 K/UL — SIGNIFICANT CHANGE UP (ref 0–0.2)
BASOPHILS NFR BLD AUTO: 0.6 % — SIGNIFICANT CHANGE UP (ref 0–2)
BILIRUB SERPL-MCNC: 0.4 MG/DL — SIGNIFICANT CHANGE UP (ref 0.2–1.2)
BLD GP AB SCN SERPL QL: NEGATIVE — SIGNIFICANT CHANGE UP
BUN SERPL-MCNC: 23 MG/DL — SIGNIFICANT CHANGE UP (ref 7–23)
CALCIUM SERPL-MCNC: 8.7 MG/DL — SIGNIFICANT CHANGE UP (ref 8.4–10.5)
CHLORIDE SERPL-SCNC: 105 MMOL/L — SIGNIFICANT CHANGE UP (ref 96–108)
CO2 SERPL-SCNC: 26 MMOL/L — SIGNIFICANT CHANGE UP (ref 22–31)
CREAT SERPL-MCNC: 0.98 MG/DL — SIGNIFICANT CHANGE UP (ref 0.5–1.3)
EOSINOPHIL # BLD AUTO: 0.15 K/UL — SIGNIFICANT CHANGE UP (ref 0–0.5)
EOSINOPHIL NFR BLD AUTO: 2.2 % — SIGNIFICANT CHANGE UP (ref 0–6)
ETHANOL SERPL-MCNC: SIGNIFICANT CHANGE UP MG/DL (ref 0–10)
GLUCOSE SERPL-MCNC: 105 MG/DL — HIGH (ref 70–99)
HCT VFR BLD CALC: 42.2 % — SIGNIFICANT CHANGE UP (ref 39–50)
HGB BLD-MCNC: 13.6 G/DL — SIGNIFICANT CHANGE UP (ref 13–17)
IMM GRANULOCYTES NFR BLD AUTO: 0.7 % — SIGNIFICANT CHANGE UP (ref 0–1.5)
INR BLD: 0.99 RATIO — SIGNIFICANT CHANGE UP (ref 0.88–1.16)
LYMPHOCYTES # BLD AUTO: 3.02 K/UL — SIGNIFICANT CHANGE UP (ref 1–3.3)
LYMPHOCYTES # BLD AUTO: 45.3 % — HIGH (ref 13–44)
MCHC RBC-ENTMCNC: 30.1 PG — SIGNIFICANT CHANGE UP (ref 27–34)
MCHC RBC-ENTMCNC: 32.2 GM/DL — SIGNIFICANT CHANGE UP (ref 32–36)
MCV RBC AUTO: 93.4 FL — SIGNIFICANT CHANGE UP (ref 80–100)
MONOCYTES # BLD AUTO: 0.49 K/UL — SIGNIFICANT CHANGE UP (ref 0–0.9)
MONOCYTES NFR BLD AUTO: 7.3 % — SIGNIFICANT CHANGE UP (ref 2–14)
NEUTROPHILS # BLD AUTO: 2.92 K/UL — SIGNIFICANT CHANGE UP (ref 1.8–7.4)
NEUTROPHILS NFR BLD AUTO: 43.9 % — SIGNIFICANT CHANGE UP (ref 43–77)
NRBC # BLD: 0 /100 WBCS — SIGNIFICANT CHANGE UP (ref 0–0)
PLATELET # BLD AUTO: 229 K/UL — SIGNIFICANT CHANGE UP (ref 150–400)
POTASSIUM SERPL-MCNC: 4.2 MMOL/L — SIGNIFICANT CHANGE UP (ref 3.5–5.3)
POTASSIUM SERPL-SCNC: 4.2 MMOL/L — SIGNIFICANT CHANGE UP (ref 3.5–5.3)
PROT SERPL-MCNC: 6.9 G/DL — SIGNIFICANT CHANGE UP (ref 6–8.3)
PROTHROM AB SERPL-ACNC: 11.4 SEC — SIGNIFICANT CHANGE UP (ref 10–12.9)
RBC # BLD: 4.52 M/UL — SIGNIFICANT CHANGE UP (ref 4.2–5.8)
RBC # FLD: 12.6 % — SIGNIFICANT CHANGE UP (ref 10.3–14.5)
RH IG SCN BLD-IMP: POSITIVE — SIGNIFICANT CHANGE UP
RH IG SCN BLD-IMP: POSITIVE — SIGNIFICANT CHANGE UP
SODIUM SERPL-SCNC: 143 MMOL/L — SIGNIFICANT CHANGE UP (ref 135–145)
TROPONIN T, HIGH SENSITIVITY RESULT: 22 NG/L — SIGNIFICANT CHANGE UP (ref 0–51)
TROPONIN T, HIGH SENSITIVITY RESULT: 26 NG/L — SIGNIFICANT CHANGE UP (ref 0–51)
WBC # BLD: 6.67 K/UL — SIGNIFICANT CHANGE UP (ref 3.8–10.5)
WBC # FLD AUTO: 6.67 K/UL — SIGNIFICANT CHANGE UP (ref 3.8–10.5)

## 2020-02-28 PROCEDURE — 99285 EMERGENCY DEPT VISIT HI MDM: CPT

## 2020-02-28 PROCEDURE — 70450 CT HEAD/BRAIN W/O DYE: CPT | Mod: 26

## 2020-02-28 PROCEDURE — 71250 CT THORAX DX C-: CPT | Mod: 26

## 2020-02-28 PROCEDURE — 73030 X-RAY EXAM OF SHOULDER: CPT | Mod: 26,RT

## 2020-02-28 PROCEDURE — 93010 ELECTROCARDIOGRAM REPORT: CPT

## 2020-02-28 PROCEDURE — 99223 1ST HOSP IP/OBS HIGH 75: CPT

## 2020-02-28 PROCEDURE — 99222 1ST HOSP IP/OBS MODERATE 55: CPT

## 2020-02-28 PROCEDURE — 72125 CT NECK SPINE W/O DYE: CPT | Mod: 26

## 2020-02-28 PROCEDURE — 72170 X-RAY EXAM OF PELVIS: CPT | Mod: 26

## 2020-02-28 RX ORDER — ACETAMINOPHEN 500 MG
1000 TABLET ORAL EVERY 6 HOURS
Refills: 0 | Status: COMPLETED | OUTPATIENT
Start: 2020-02-28 | End: 2020-02-29

## 2020-02-28 RX ORDER — OXYCODONE HYDROCHLORIDE 5 MG/1
5 TABLET ORAL EVERY 4 HOURS
Refills: 0 | Status: DISCONTINUED | OUTPATIENT
Start: 2020-02-28 | End: 2020-03-04

## 2020-02-28 RX ORDER — CHOLECALCIFEROL (VITAMIN D3) 125 MCG
1000 CAPSULE ORAL DAILY
Refills: 0 | Status: DISCONTINUED | OUTPATIENT
Start: 2020-02-28 | End: 2020-03-04

## 2020-02-28 RX ORDER — LIPASE/PROTEASE/AMYLASE 16-48-48K
2 CAPSULE,DELAYED RELEASE (ENTERIC COATED) ORAL
Refills: 0 | Status: DISCONTINUED | OUTPATIENT
Start: 2020-02-28 | End: 2020-03-04

## 2020-02-28 RX ORDER — OXYCODONE HYDROCHLORIDE 5 MG/1
2.5 TABLET ORAL EVERY 4 HOURS
Refills: 0 | Status: DISCONTINUED | OUTPATIENT
Start: 2020-02-28 | End: 2020-03-04

## 2020-02-28 RX ORDER — IBUPROFEN 200 MG
200 TABLET ORAL EVERY 6 HOURS
Refills: 0 | Status: DISCONTINUED | OUTPATIENT
Start: 2020-02-28 | End: 2020-02-29

## 2020-02-28 RX ORDER — MIRABEGRON 50 MG/1
25 TABLET, EXTENDED RELEASE ORAL DAILY
Refills: 0 | Status: DISCONTINUED | OUTPATIENT
Start: 2020-02-28 | End: 2020-03-04

## 2020-02-28 RX ORDER — CHLORHEXIDINE GLUCONATE 213 G/1000ML
1 SOLUTION TOPICAL DAILY
Refills: 0 | Status: DISCONTINUED | OUTPATIENT
Start: 2020-02-28 | End: 2020-02-29

## 2020-02-28 RX ORDER — ACETAMINOPHEN 500 MG
975 TABLET ORAL ONCE
Refills: 0 | Status: DISCONTINUED | OUTPATIENT
Start: 2020-02-28 | End: 2020-02-28

## 2020-02-28 RX ORDER — ENOXAPARIN SODIUM 100 MG/ML
40 INJECTION SUBCUTANEOUS DAILY
Refills: 0 | Status: DISCONTINUED | OUTPATIENT
Start: 2020-02-28 | End: 2020-03-04

## 2020-02-28 RX ORDER — LIDOCAINE 4 G/100G
1 CREAM TOPICAL DAILY
Refills: 0 | Status: DISCONTINUED | OUTPATIENT
Start: 2020-02-28 | End: 2020-03-04

## 2020-02-28 RX ORDER — TAMSULOSIN HYDROCHLORIDE 0.4 MG/1
0.4 CAPSULE ORAL AT BEDTIME
Refills: 0 | Status: DISCONTINUED | OUTPATIENT
Start: 2020-02-28 | End: 2020-03-04

## 2020-02-28 RX ADMIN — TAMSULOSIN HYDROCHLORIDE 0.4 MILLIGRAM(S): 0.4 CAPSULE ORAL at 21:02

## 2020-02-28 RX ADMIN — Medication 1000 MILLIGRAM(S): at 15:20

## 2020-02-28 RX ADMIN — ENOXAPARIN SODIUM 40 MILLIGRAM(S): 100 INJECTION SUBCUTANEOUS at 11:29

## 2020-02-28 RX ADMIN — Medication 400 MILLIGRAM(S): at 11:30

## 2020-02-28 RX ADMIN — Medication 400 MILLIGRAM(S): at 20:04

## 2020-02-28 RX ADMIN — Medication 200 MILLIGRAM(S): at 15:12

## 2020-02-28 RX ADMIN — Medication 2 CAPSULE(S): at 21:02

## 2020-02-28 RX ADMIN — LIDOCAINE 1 PATCH: 4 CREAM TOPICAL at 19:50

## 2020-02-28 RX ADMIN — Medication 2 CAPSULE(S): at 15:12

## 2020-02-28 RX ADMIN — LIDOCAINE 1 PATCH: 4 CREAM TOPICAL at 11:30

## 2020-02-28 RX ADMIN — MIRABEGRON 25 MILLIGRAM(S): 50 TABLET, EXTENDED RELEASE ORAL at 15:13

## 2020-02-28 NOTE — H&P ADULT - ASSESSMENT
Patient is an 85 year old male with a PMH of dementia, pancreatic insufficiency, and urinary incontinence with a recent history of a fall in October 2019 who is presenting today with a fall and left sided rib fractures from 3-7.    -Continuous pulse ox for monitoring  -Incentive spirometer - patient educated on how to use as well as the wife  -Pain control with multimodality treatment  -SICU consulted due to multiple rib fractures and history of dementia/Fijian speaking  -C-collar removed with confrontation exam and negative CT neck  -Will admit to surgical service  -Discussed with attending    Amandeep Barraza PGY3  Trauma Surgery #[id number] Patient is an 85 year old male with a PMH of dementia, pancreatic insufficiency, and urinary incontinence with a recent history of a fall in October 2019 who is presenting today with a fall and left sided rib fractures from 3-7.    -Continuous pulse ox for monitoring  -Incentive spirometer - patient educated on how to use as well as the wife  -Pain control with multimodality treatment  -SICU consulted due to multiple rib fractures and history of dementia  -C-collar removed with confrontation exam and negative CT neck  -Will admit to surgical service  -Discussed with attending    Amandeep Barraza PGY3  Trauma Surgery #[id number]

## 2020-02-28 NOTE — ED PROVIDER NOTE - CROS ED CONS ALL NEG
DUPLICATE.     rOPINIRole (REQUIP) 0.25 MG tablet WAS FILLED ON 7/6/2017, QTY 60 WITH 1 REFILLS.     
Denied, duplicate.    Iveth Vora, MSN, RN-BC      
- - -

## 2020-02-28 NOTE — CONSULT NOTE ADULT - ASSESSMENT
85M s/p mechanical fall down 3 steps with left 3-4 rib fractures, trace hemothorax, able to pull >1L on IS  -multimodal pain control  -IS 10x per hr  -repeat CXR in PM to monitor for expanding hemothorax  -repeat CBC in PM   -please reconsult SICU as needed, including worsening IS or expanding hemothorax on CXR, or requirement of supplemental O2  -d/w Dr. Rao and Dr. Denise Tristan  SICU fellow

## 2020-02-28 NOTE — ED PROVIDER NOTE - OBJECTIVE STATEMENT
85 year-old male MD Leonard: 85 year old male, past medical history pancreatic mass s/p resection, does not know rest of history, who presented to the ED for fall down the stairs. Patient was a level 2 trauma activation called from the field. Patient reportedly was carrying boxes and he fell down the stairs about 3-4, onto carpet. No head or neck pain. LOC for about 30 seconds. Only complains of left inferior anterior/lateral rib pain. No chest pain, shortness of breath, dizziness, lightheadedness, prior to or after fall. No sick contacts or recent travel. Also, reports right shoulder pain.

## 2020-02-28 NOTE — CONSULT NOTE ADULT - ASSESSMENT
85 year old male with a PMH of dementia, pancreatic insufficiency, and urinary incontinence with a recent history of a fall in October 2019 who is presenting with a fall found to have Left 3-7 rib fractures and right sided clavicular fracture

## 2020-02-28 NOTE — H&P ADULT - HISTORY OF PRESENT ILLNESS
Patient is an 85 year old male with a PMH of dementia, pancreatic insufficiency, and urinary incontinence with a recent history of a fall in October 2019 who is presenting today with a fall.  He had CP while on the steps and then syncopized according to his wife and fell down three steps that were carpeted.  He had LOC for about 30 seconds and is currently still having some left sided chest pain that is decreased.  He was brought in by EMS.    His history of pancreatic insufficiency was obtained per the wife and he had a "mass" about 9 years ago that was drained through an endoscopy and was told it wasn't cancer.  He used to drink alcohol over three decades ago.  He is not a smoker.  PCP: Dr. Eduar Cifuentes (part of [facility] - notes are in allscripts)  Provider writing outpatient medications: Dr. MELONIE Mckeon Patient is an 85 year old male with a PMH of dementia, pancreatic insufficiency, and urinary incontinence with a recent history of a fall in October 2019 who is presenting today with a fall.  Originally it was thought he had CP while on the steps and syncopized and fell down three steps that were carpeted per the EMS, but it was later revealed by the wife that he fell and then had CP.  He had LOC for about 30 seconds and is currently still having some left sided chest pain that is decreased.  He was brought in by EMS.    His history of pancreatic insufficiency was obtained per the wife and he had a "mass" about 9 years ago that was drained through an endoscopy and was told it wasn't cancer.  He used to drink alcohol over three decades ago.  He is not a smoker.  PCP: Dr. Eduar Cifuentes (part of [facility] - notes are in allscripts)  Provider writing outpatient medications: Dr. MELONIE Mckeon

## 2020-02-28 NOTE — ED ADULT NURSE NOTE - INTERVENTIONS DEFINITIONS
Stretcher in lowest position, wheels locked, appropriate side rails in place/Call bell, personal items and telephone within reach/Provide visual clues: red socks

## 2020-02-28 NOTE — H&P ADULT - NSHPLABSRESULTS_GEN_ALL_CORE
CBC (02-28 @ 08:58)                          13.6                     6.67    )--------------(  229        43.9  % Neuts, 45.3<H>% Lymphs, ANC: 2.92                            42.2      BMP (02-28 @ 08:58)       143     |  105     |  23    			Ca++ --      Ca 8.7          ---------------------------------( 105<H>		Mg --           4.2     |  26      |  0.98  			Ph --        LFTs (02-28 @ 08:58)      TPro 6.9 / Alb 3.6 / TBili 0.4 / DBili -- / AST 17 / ALT 14 / AlkPhos 78    Coags (02-28 @ 08:58)  aPTT 26.9<L> / INR 0.99 / PT 11.4        IMAGING:    CT BRAIN:  No acute intracranial hemorrhage or mass effect.  No displaced calvarial fracture.    CT CERVICAL SPINE:  No acute fracture or traumatic subluxation. No prevertebral soft tissue swelling.  Degenerative changes.      CT CHEST  Impression: Fractures of the left third through the seventh ribs. No pneumothorax is noted. Trace left pleural effusion is noted.    XRAY pelvis:  IMPRESSION:  No fracture or dislocation of the pelvis.

## 2020-02-28 NOTE — CONSULT NOTE ADULT - SUBJECTIVE AND OBJECTIVE BOX
SICU Consult  Consulting attending: Dr. Rao    HPI: Patient is a Sinhala speaking 85 year old male with a PMH of dementia, pancreatic insufficiency, and urinary incontinence with a recent history of a fall in October 2019 who is presenting today with a fall.  Originally it was thought he had CP while on the steps and syncopized and fell down three steps that were carpeted per the EMS, but it was later revealed by the wife that he fell and then had CP.  He had LOC for about 30 seconds and is currently still having some left sided chest pain that is decreased.  He was brought in by EMS.    His history of pancreatic insufficiency was obtained per the wife and he had a "mass" about 9 years ago that was drained through an endoscopy and was told it wasn't cancer.  He used to drink alcohol over three decades ago.  He is not a smoker.    SICU was consulted for respiratory monitoring in this elderly male with 4 rib fractures, trace hemothorax.     On my evaluation patient c/o left sided chest pain. He was breathing comfortably on RA, saturating upper 90s. He was able to pull >1L on incentive spirometry.    PAST MEDICAL HISTORY:  Dementia  Pancreatic insufficiency      PAST SURGICAL HISTORY:  none    MEDICATIONS:      ALLERGIES:  No Known Allergies      VITALS & I/Os:  Vital Signs Last 24 Hrs  T(C): 36.3 (28 Feb 2020 09:04), Max: 36.3 (28 Feb 2020 09:04)  T(F): 97.3 (28 Feb 2020 09:04), Max: 97.3 (28 Feb 2020 09:04)  HR: 80 (28 Feb 2020 09:04) (80 - 80)  BP: 177/74 (28 Feb 2020 09:04) (177/74 - 177/74)  BP(mean): --  RR: 18 (28 Feb 2020 09:04) (18 - 18)  SpO2: 97% (28 Feb 2020 09:04) (97% - 97%)    I&O's Summary      PHYSICAL EXAM:  General: No acute distress  Respiratory: Nonlabored. Left chest wall tenderness  Cardiovascular: RRR  Abdominal: Soft, nondistended, nontender. No rebound or guarding. No organomegaly, no palpable mass.  Extremities: Warm    LABS:                        13.6   6.67  )-----------( 229      ( 28 Feb 2020 08:58 )             42.2     02-28    143  |  105  |  23  ----------------------------<  105<H>  4.2   |  26  |  0.98    Ca    8.7      28 Feb 2020 08:58    TPro  6.9  /  Alb  3.6  /  TBili  0.4  /  DBili  x   /  AST  17  /  ALT  14  /  AlkPhos  78  02-28    Lactate:    PT/INR - ( 28 Feb 2020 08:58 )   PT: 11.4 sec;   INR: 0.99 ratio         PTT - ( 28 Feb 2020 08:58 )  PTT:26.9 sec              IMAGING:  < from: CT Head No Cont (02.28.20 @ 09:02) >  CT BRAIN:  No acute intracranial hemorrhage or mass effect.  No displaced calvarial fracture.    CT CERVICAL SPINE:  No acute fracture or traumatic subluxation. No prevertebral soft tissue swelling.  Degenerative changes.    < end of copied text >  < from: CT Chest No Cont (02.28.20 @ 09:00) >  Few small calcified and noncalcified lymph nodes are present in the pretracheal space, AP window, subcarinal region and the left hilum.     Heart is normal in size. Calcification of the aortic valve and the coronary arteries is noted. No pericardial effusion is noted.     No endobronchial lesions are noted. Minimal scarring is noted at both lung apices. Remaining lungs are clear. Trace left pleural effusion is noted. No pneumothorax is noted.    Below the diaphragm, visualized portions of the abdomen demonstrate smallright renal calcification.     Visualized osseous structures demonstrate fractures of the left third through the seventh ribs.    Impression: Fractures of the left third through the seventh ribs. No pneumothorax is noted. Trace left pleural effusion is noted.    < end of copied text >

## 2020-02-28 NOTE — ED PROVIDER NOTE - PHYSICAL EXAMINATION
MD Leonard:     Gen: Well appearing, Well Developed, No Acute Distress  HEENT: Normocephalic, Atraumatic, Pupils equal round and reactive to light, Mucous Membranes Moist, Trachea Midline, no cervical tenderness, c-collar in place   Cards: Regular Rate and Rhythm, No murmurs, No JVD, No pedal edema  Pulm: Lungs clear bilaterally, no respiratory distress, no accessory muscle use, left anterior/lateral inferior tenderness, no crepitus  Abd: abdomen soft, supple, non-tender, no rebound or guarding, bowel sounds normoactive x4, no masses, no pulsatile masses,   Ext: moving all extremities, pulses x4 strong and regular  Neuro: AxOx3, no focal neuro deficits   Psych: normal mood and behavior   Back: no midline tenderness  Skin: warm, dry, no rash

## 2020-02-28 NOTE — ED ADULT TRIAGE NOTE - NS ED NURSE DIRECT TO ROOM YN
Patient sent [employer] message requesting to have cholesterol checked.  There are no orders for this, is it necessary at this time?    Nakia Walden MA    
Yes

## 2020-02-28 NOTE — H&P ADULT - NSHPPHYSICALEXAM_GEN_ALL_CORE
PRIMARY SURVEY:  Airway: intact  Breathing: breath sounds bilaterally  Circulation: HTN with SBP in the 170s    Secondary Survey:  General: NAD  HEENT: Normocephalic, atraumatic, C-collar in place  Neck: Soft, midline trachea, C-collar in place  Chest: Chest wall tenderness of the left anterior area inferior to nipple.  There is slight ecchymosis in this area.  No crepitus appreciated.  Cardiac: regular rate  Respiratory: Bilateral breath sounds, clear and equal bilaterally  Abdomen: Soft, non-distended, non-tender  Pelvis: Stable, non-tender, no ecchymosis  Ext: palp radial b/l UE, b/l DP palp in Lower Extrem, motor and sensory grossly intact in all 4 extremities  Back: no TTP, no palpable runoff/stepoff/deformity  Rectal: No xena blood in rectal area.  Small sacral partial thickness ulcer present.

## 2020-02-28 NOTE — CONSULT NOTE ADULT - PROBLEM SELECTOR RECOMMENDATION 2
pt with syncopal episode   ?chest pain before the event   trend troponins   check echo  EKG nsr 78 no st/t changes   monitor on tele

## 2020-02-28 NOTE — CONSULT NOTE ADULT - SUBJECTIVE AND OBJECTIVE BOX
HPI:  85 year old male with a PMH of dementia, pancreatic insufficiency, and urinary incontinence with a recent history of a fall in October 2019 who is presenting today with a fall.  Originally it was thought he had CP while on the steps and syncopized and fell down three steps that were carpeted per the EMS, but it was later revealed by the wife that he fell and then had CP.  He had LOC for about 30 seconds and is currently still having some left sided chest pain that is decreased.  He was brought in by EMS.    His history of pancreatic insufficiency was obtained per the wife and he had a "mass" about 9 years ago that was drained through an endoscopy and was told it wasn't cancer.  He used to drink alcohol over three decades ago.  He is not a smoker.  PCP: Dr. Eduar Cifuentes (part of [facility] - notes are in allscripts)  Provider writing outpatient medications: Dr. MELONIE Mckeon (28 Feb 2020 10:09)  Translating in Sudanese by self       PAST MEDICAL & SURGICAL HISTORY:  Dementia  Pancreatic insufficiency      Review of Systems:   CONSTITUTIONAL: No fever,  EYES: No eye pain,   ENMT:  No difficulty hearing  NECK: No pain or stiffness  RESPIRATORY: No shortness of breath  CARDIOVASCULAR: No chest pain  GASTROINTESTINAL: No abdominal or epigastric pain.   GENITOURINARY: No dysuria,  NEUROLOGICAL: No headaches,  SKIN: No itching,  ENDOCRINE: No heat or cold intolerance;  MUSCULOSKELETAL: No joint pain or swelling;   PSYCHIATRIC: No depression,  ALLERY AND IMMUNOLOGIC: No hives or eczema    Allergies    No Known Allergies    Intolerances            MEDICATIONS  (STANDING):  acetaminophen  IVPB .. 1000 milliGRAM(s) IV Intermittent every 6 hours  enoxaparin Injectable 40 milliGRAM(s) SubCutaneous daily  ibuprofen  Tablet. 200 milliGRAM(s) Oral every 6 hours  lidocaine   Patch 1 Patch Transdermal daily  mirabegron ER 25 milliGRAM(s) Oral daily  pancrelipase  (CREON 36,000 Lipase Units) 2 Capsule(s) Oral three times a day with meals  tamsulosin 0.4 milliGRAM(s) Oral at bedtime    MEDICATIONS  (PRN):        CAPILLARY BLOOD GLUCOSE        I&O's Summary      PHYSICAL EXAM:  GENERAL: NAD, frail  HEAD:  Atraumatic, Normocephalic  EYES: conjunctiva and sclera clear  NECK:  No JVD  CHEST/LUNG: decreased breath sounds bases  HEART: Regular rate and rhythm; S1S2  ABDOMEN: Soft, Nontender, Nondistended; Bowel sounds present  EXTREMITIES:  2+ Peripheral Pulses,  PSYCH: AAOx2      LABS:                        13.6   6.67  )-----------( 229      ( 28 Feb 2020 08:58 )             42.2     02-28    143  |  105  |  23  ----------------------------<  105<H>  4.2   |  26  |  0.98    Ca    8.7      28 Feb 2020 08:58    TPro  6.9  /  Alb  3.6  /  TBili  0.4  /  DBili  x   /  AST  17  /  ALT  14  /  AlkPhos  78  02-28    PT/INR - ( 28 Feb 2020 08:58 )   PT: 11.4 sec;   INR: 0.99 ratio         PTT - ( 28 Feb 2020 08:58 )  PTT:26.9 sec          RADIOLOGY & ADDITIONAL TESTS:    Imaging Personally Reviewed:    Consultant(s) Notes Reviewed:      Care Discussed with Consultants/Other Providers:

## 2020-02-28 NOTE — ED PROVIDER NOTE - ATTENDING CONTRIBUTION TO CARE
85 year old male, past medical history pancreatic mass s/p resection, does not know rest of history, who presented to the ED for fall down the stairs. Patient was a level 2 trauma activation called from the field. Patient reportedly was carrying boxes and he fell down the stairs about 3-4, onto carpet. No head or neck pain. LOC for about 30 seconds. Only complains of left inferior anterior/lateral rib pain. No chest pain, shortness of breath, dizziness, lightheadedness, prior to or after fall. No sick contacts or recent travel. Also, reports right shoulder pain.      [id number]    Gen: Well appearing, Well Developed, No Acute Distress  HEENT: Normocephalic, Atraumatic, Pupils equal round and reactive to light, Mucous Membranes Moist, Trachea Midline, no cervical tenderness, c-collar in place   Cards: Regular Rate and Rhythm, No murmurs, No JVD, No pedal edema  Pulm: Lungs clear bilaterally, no respiratory distress, no accessory muscle use, left anterior/lateral inferior tenderness, no crepitus  Abd: abdomen soft, supple, non-tender, no rebound or guarding, bowel sounds normoactive x4, no masses, no pulsatile masses,   Ext: moving all extremities, pulses x4 strong and regular  Neuro: AxOx3, no focal neuro deficits   Psych: normal mood and behavior   Back: no midline tenderness  Skin: warm, dry, no rash    A/P: 85 year old male, past medical history pancreatic mass s/p resection, does not know rest of history, who presented to the ED for fall down the stairs, mechanical. Patient was a level 2 trauma activation. Left anterior/lateral inferior rib pain. Right shoulder pain. No chest pain, shortness of breath, dizziness, lightheadedness prior to fall. did have 30 seconds episode of unconsciousness. Will obtain CT head, cervical, chest. No abdominal tenderness or pain, hold CT A/P for now, right shoulder x-ray. labwork and pain control

## 2020-02-28 NOTE — CONSULT NOTE ADULT - PROBLEM SELECTOR RECOMMENDATION 4
Hx of multiple falls likely in the setting of dementia   start vitamin d 1000u/day   fall precautions   pt eval

## 2020-02-28 NOTE — ED ADULT NURSE REASSESSMENT NOTE - NS ED NURSE REASSESS COMMENT FT1
Pt observed sitting up in stretcher conversing with RN without difficulty. Breathing spontaneous and unlabored. Pt updated on plan of care awaiting bed assignment. No acute distress noted.

## 2020-02-28 NOTE — CONSULT NOTE ADULT - PROBLEM SELECTOR RECOMMENDATION 9
Found to have left 3-7 rib fractures   Incentive spirometry   Pain control with tylenol and lidocaine patch   MInimize use of opiates in elderly   PT eval   Sx follow up

## 2020-02-29 LAB
ANION GAP SERPL CALC-SCNC: 13 MMOL/L — SIGNIFICANT CHANGE UP (ref 5–17)
APPEARANCE UR: CLEAR — SIGNIFICANT CHANGE UP
APTT BLD: 28.8 SEC — SIGNIFICANT CHANGE UP (ref 27.5–36.3)
BILIRUB UR-MCNC: NEGATIVE — SIGNIFICANT CHANGE UP
BUN SERPL-MCNC: 25 MG/DL — HIGH (ref 7–23)
CALCIUM SERPL-MCNC: 8.2 MG/DL — LOW (ref 8.4–10.5)
CHLORIDE SERPL-SCNC: 105 MMOL/L — SIGNIFICANT CHANGE UP (ref 96–108)
CO2 SERPL-SCNC: 20 MMOL/L — LOW (ref 22–31)
COLOR SPEC: SIGNIFICANT CHANGE UP
CREAT SERPL-MCNC: 0.9 MG/DL — SIGNIFICANT CHANGE UP (ref 0.5–1.3)
DIFF PNL FLD: NEGATIVE — SIGNIFICANT CHANGE UP
GLUCOSE SERPL-MCNC: 113 MG/DL — HIGH (ref 70–99)
GLUCOSE UR QL: NEGATIVE — SIGNIFICANT CHANGE UP
HCT VFR BLD CALC: 36.4 % — LOW (ref 39–50)
HGB BLD-MCNC: 11.8 G/DL — LOW (ref 13–17)
INR BLD: 1.08 RATIO — SIGNIFICANT CHANGE UP (ref 0.88–1.16)
KETONES UR-MCNC: SIGNIFICANT CHANGE UP
LEUKOCYTE ESTERASE UR-ACNC: NEGATIVE — SIGNIFICANT CHANGE UP
MAGNESIUM SERPL-MCNC: 2 MG/DL — SIGNIFICANT CHANGE UP (ref 1.6–2.6)
MCHC RBC-ENTMCNC: 29.9 PG — SIGNIFICANT CHANGE UP (ref 27–34)
MCHC RBC-ENTMCNC: 32.4 GM/DL — SIGNIFICANT CHANGE UP (ref 32–36)
MCV RBC AUTO: 92.2 FL — SIGNIFICANT CHANGE UP (ref 80–100)
NITRITE UR-MCNC: NEGATIVE — SIGNIFICANT CHANGE UP
NRBC # BLD: 0 /100 WBCS — SIGNIFICANT CHANGE UP (ref 0–0)
PH UR: 5.5 — SIGNIFICANT CHANGE UP (ref 5–8)
PHOSPHATE SERPL-MCNC: 3 MG/DL — SIGNIFICANT CHANGE UP (ref 2.5–4.5)
PLATELET # BLD AUTO: 209 K/UL — SIGNIFICANT CHANGE UP (ref 150–400)
POTASSIUM SERPL-MCNC: 3.9 MMOL/L — SIGNIFICANT CHANGE UP (ref 3.5–5.3)
POTASSIUM SERPL-SCNC: 3.9 MMOL/L — SIGNIFICANT CHANGE UP (ref 3.5–5.3)
PROT UR-MCNC: NEGATIVE — SIGNIFICANT CHANGE UP
PROTHROM AB SERPL-ACNC: 12.5 SEC — SIGNIFICANT CHANGE UP (ref 10–12.9)
RBC # BLD: 3.95 M/UL — LOW (ref 4.2–5.8)
RBC # FLD: 12.5 % — SIGNIFICANT CHANGE UP (ref 10.3–14.5)
SODIUM SERPL-SCNC: 138 MMOL/L — SIGNIFICANT CHANGE UP (ref 135–145)
SP GR SPEC: 1.02 — SIGNIFICANT CHANGE UP (ref 1.01–1.02)
UROBILINOGEN FLD QL: NEGATIVE — SIGNIFICANT CHANGE UP
WBC # BLD: 6.23 K/UL — SIGNIFICANT CHANGE UP (ref 3.8–10.5)
WBC # FLD AUTO: 6.23 K/UL — SIGNIFICANT CHANGE UP (ref 3.8–10.5)

## 2020-02-29 PROCEDURE — 73000 X-RAY EXAM OF COLLAR BONE: CPT | Mod: 26,RT

## 2020-02-29 PROCEDURE — 71045 X-RAY EXAM CHEST 1 VIEW: CPT | Mod: 26

## 2020-02-29 PROCEDURE — 99221 1ST HOSP IP/OBS SF/LOW 40: CPT | Mod: GC

## 2020-02-29 PROCEDURE — 99232 SBSQ HOSP IP/OBS MODERATE 35: CPT | Mod: GC

## 2020-02-29 RX ORDER — SENNA PLUS 8.6 MG/1
2 TABLET ORAL AT BEDTIME
Refills: 0 | Status: DISCONTINUED | OUTPATIENT
Start: 2020-02-29 | End: 2020-03-04

## 2020-02-29 RX ORDER — IBUPROFEN 200 MG
400 TABLET ORAL EVERY 6 HOURS
Refills: 0 | Status: DISCONTINUED | OUTPATIENT
Start: 2020-02-29 | End: 2020-03-02

## 2020-02-29 RX ORDER — ACETAMINOPHEN 500 MG
650 TABLET ORAL EVERY 6 HOURS
Refills: 0 | Status: DISCONTINUED | OUTPATIENT
Start: 2020-02-29 | End: 2020-03-04

## 2020-02-29 RX ORDER — DONEPEZIL HYDROCHLORIDE 10 MG/1
5 TABLET, FILM COATED ORAL AT BEDTIME
Refills: 0 | Status: DISCONTINUED | OUTPATIENT
Start: 2020-02-29 | End: 2020-03-04

## 2020-02-29 RX ORDER — POLYETHYLENE GLYCOL 3350 17 G/17G
17 POWDER, FOR SOLUTION ORAL DAILY
Refills: 0 | Status: DISCONTINUED | OUTPATIENT
Start: 2020-02-29 | End: 2020-03-04

## 2020-02-29 RX ORDER — CHLORHEXIDINE GLUCONATE 213 G/1000ML
1 SOLUTION TOPICAL
Refills: 0 | Status: DISCONTINUED | OUTPATIENT
Start: 2020-02-29 | End: 2020-03-04

## 2020-02-29 RX ORDER — POTASSIUM CHLORIDE 20 MEQ
20 PACKET (EA) ORAL ONCE
Refills: 0 | Status: COMPLETED | OUTPATIENT
Start: 2020-02-29 | End: 2020-02-29

## 2020-02-29 RX ORDER — SERTRALINE 25 MG/1
25 TABLET, FILM COATED ORAL DAILY
Refills: 0 | Status: DISCONTINUED | OUTPATIENT
Start: 2020-02-29 | End: 2020-03-04

## 2020-02-29 RX ADMIN — Medication 650 MILLIGRAM(S): at 21:10

## 2020-02-29 RX ADMIN — LIDOCAINE 1 PATCH: 4 CREAM TOPICAL at 11:45

## 2020-02-29 RX ADMIN — SERTRALINE 25 MILLIGRAM(S): 25 TABLET, FILM COATED ORAL at 11:47

## 2020-02-29 RX ADMIN — Medication 650 MILLIGRAM(S): at 14:40

## 2020-02-29 RX ADMIN — Medication 400 MILLIGRAM(S): at 11:45

## 2020-02-29 RX ADMIN — Medication 400 MILLIGRAM(S): at 00:03

## 2020-02-29 RX ADMIN — Medication 2 CAPSULE(S): at 07:37

## 2020-02-29 RX ADMIN — CHLORHEXIDINE GLUCONATE 1 APPLICATION(S): 213 SOLUTION TOPICAL at 05:53

## 2020-02-29 RX ADMIN — Medication 2.5 MILLIGRAM(S): at 05:50

## 2020-02-29 RX ADMIN — Medication 20 MILLIEQUIVALENT(S): at 07:37

## 2020-02-29 RX ADMIN — LIDOCAINE 1 PATCH: 4 CREAM TOPICAL at 19:00

## 2020-02-29 RX ADMIN — Medication 400 MILLIGRAM(S): at 05:50

## 2020-02-29 RX ADMIN — Medication 1000 UNIT(S): at 11:46

## 2020-02-29 RX ADMIN — ENOXAPARIN SODIUM 40 MILLIGRAM(S): 100 INJECTION SUBCUTANEOUS at 11:46

## 2020-02-29 RX ADMIN — Medication 2 CAPSULE(S): at 18:28

## 2020-02-29 RX ADMIN — SENNA PLUS 2 TABLET(S): 8.6 TABLET ORAL at 21:13

## 2020-02-29 RX ADMIN — LIDOCAINE 1 PATCH: 4 CREAM TOPICAL at 00:03

## 2020-02-29 RX ADMIN — POLYETHYLENE GLYCOL 3350 17 GRAM(S): 17 POWDER, FOR SOLUTION ORAL at 11:47

## 2020-02-29 RX ADMIN — DONEPEZIL HYDROCHLORIDE 5 MILLIGRAM(S): 10 TABLET, FILM COATED ORAL at 21:17

## 2020-02-29 RX ADMIN — Medication 650 MILLIGRAM(S): at 21:14

## 2020-02-29 RX ADMIN — Medication 2 CAPSULE(S): at 11:47

## 2020-02-29 RX ADMIN — Medication 200 MILLIGRAM(S): at 00:03

## 2020-02-29 RX ADMIN — Medication 400 MILLIGRAM(S): at 12:15

## 2020-02-29 RX ADMIN — Medication 400 MILLIGRAM(S): at 18:32

## 2020-02-29 RX ADMIN — Medication 650 MILLIGRAM(S): at 14:10

## 2020-02-29 RX ADMIN — MIRABEGRON 25 MILLIGRAM(S): 50 TABLET, EXTENDED RELEASE ORAL at 11:47

## 2020-02-29 RX ADMIN — Medication 400 MILLIGRAM(S): at 18:28

## 2020-02-29 RX ADMIN — TAMSULOSIN HYDROCHLORIDE 0.4 MILLIGRAM(S): 0.4 CAPSULE ORAL at 21:12

## 2020-02-29 NOTE — PATIENT PROFILE ADULT - NSPROHMSYMPCOND_GEN_A_NUR
cardiovascular/pancreatic insufficiency managed my med regiment pancreatic insufficiency managed my med regiment

## 2020-02-29 NOTE — PROGRESS NOTE ADULT - ASSESSMENT
85M s/p mechanical fall down 3 steps with left 3-4 rib fractures, trace hemothorax, able to pull >1L on IS    Neuro  - AOx3  - multimodal pain control w/ IV Tylenol and standing Motrin, Oxy, lidocaine patch  - continue home donepezil, Zoloft    Resp  - able to pull 1.6L on IS  - AM CXR to monitor trace hemothorax    CVS  - hemodynamically stable  - troponin of 26, will not trend further    GI  - regular diet  - bowel regimen      - strict I&O's  - continue home mirabegron    Heme  - stable H/h  - on dvt ppx    ID  - no issues  - not on Abx    Endo  - continue home prednisone

## 2020-02-29 NOTE — PHYSICAL THERAPY INITIAL EVALUATION ADULT - MANUAL MUSCLE TESTING RESULTS, REHAB EVAL
functionally at least 3+/5 t/o - RUE not formally assessed due to NWB in sling/grossly assessed due to

## 2020-02-29 NOTE — PROGRESS NOTE ADULT - SUBJECTIVE AND OBJECTIVE BOX
Trauma Surgery Progress Note    Interval: No acute overnight events.    SUBJECTIVE: Patient seen and examined at the bedside. Feeling well this morning. Tolerating diet without nausea or vomiting. Pain is well controlled.  Pulling 2L on IS    VITALS  T(C): 36.5 (02-29-20 @ 07:00), Max: 37.1 (02-28-20 @ 23:00)  HR: 72 (02-29-20 @ 07:00) (67 - 84)  BP: 126/78 (02-29-20 @ 07:00) (126/78 - 177/74)  RR: 23 (02-29-20 @ 07:00) (16 - 57)  SpO2: 96% (02-29-20 @ 07:00) (94% - 98%)    Is/Os    02-28 @ 07:01  -  02-29 @ 07:00  --------------------------------------------------------  IN:    IV PiggyBack: 200 mL    Oral Fluid: 200 mL  Total IN: 400 mL    OUT:    Voided: 600 mL  Total OUT: 600 mL    Total NET: -200 mL        PHYSICAL EXAM:   General: NAD, Lying in bed comfortably, alert, oriented x3  Pulm: Non-labored breathing on RA, pulling 2L on IS  GI/Abd: Soft, NT/ND, no rebound/guarding  Ext: RUE in sling    MEDICATIONS (STANDING): cholecalciferol 1000 Unit(s) Oral daily  donepezil 5 milliGRAM(s) Oral at bedtime  enoxaparin Injectable 40 milliGRAM(s) SubCutaneous daily  ibuprofen  Tablet. 400 milliGRAM(s) Oral every 6 hours  mirabegron ER 25 milliGRAM(s) Oral daily  pancrelipase  (CREON 36,000 Lipase Units) 2 Capsule(s) Oral three times a day with meals  polyethylene glycol 3350 17 Gram(s) Oral daily  predniSONE   Tablet 2.5 milliGRAM(s) Oral daily  senna 2 Tablet(s) Oral at bedtime  sertraline 25 milliGRAM(s) Oral daily  tamsulosin 0.4 milliGRAM(s) Oral at bedtime    MEDICATIONS (PRN):oxyCODONE    IR 2.5 milliGRAM(s) Oral every 4 hours PRN Moderate Pain (4 - 6)  oxyCODONE    IR 5 milliGRAM(s) Oral every 4 hours PRN Severe Pain (7 - 10)    LABS  CBC (02-29 @ 00:49)                              11.8<L>                         6.23    )----------------(  209        --    % Neutrophils, --    % Lymphocytes, ANC: --                                  36.4<L>  CBC (02-28 @ 08:58)                              13.6                           6.67    )----------------(  229        43.9  % Neutrophils, 45.3<H>% Lymphocytes, ANC: 2.92                                42.2      BMP (02-29 @ 00:49)             138     |  105     |  25<H> 		Ca++ --      Ca 8.2<L>             ---------------------------------( 113<H>		Mg 2.0                3.9     |  20<L>   |  0.90  			Ph 3.0     BMP (02-28 @ 08:58)             143     |  105     |  23    		Ca++ --      Ca 8.7                ---------------------------------( 105<H>		Mg --                 4.2     |  26      |  0.98  			Ph --        LFTs (02-28 @ 08:58)      TPro 6.9 / Alb 3.6 / TBili 0.4 / DBili -- / AST 17 / ALT 14 / AlkPhos 78    Coags (02-29 @ 00:49)  aPTT 28.8 / INR 1.08 / PT 12.5  Coags (02-28 @ 08:58)  aPTT 26.9<L> / INR 0.99 / PT 11.4

## 2020-02-29 NOTE — PHYSICAL THERAPY INITIAL EVALUATION ADULT - ADDITIONAL COMMENTS
Pt resides in Pt resides in private home with spouse & grandson, 3 steps to enter, flight within. Dtr states that patient has cane but he usually is ambulatory without AD, mostly independent with mobility and ADL's.

## 2020-02-29 NOTE — CONSULT NOTE ADULT - SUBJECTIVE AND OBJECTIVE BOX
85yMale c/o R shoulder pain  s/p mechanical fall yesterday. Patient denies head hit or LOC. Patient denies numbness or tingling in the RUE. Patient denies any other injuries.    ROS: 10 point ROS otherwise negative    PMH:  Dementia  Pancreatic insufficiency  Incontinence  Nocturia  BPH (benign prostatic hyperplasia)  Elevated blood sugar  Anxiety  Memory loss, short term  Poor historian  Language barrier  Pancreatic mass  Pancreatitis  Basal cell carcinoma    PSH:  S/P biopsy  S/P colonoscopy    AH:    Meds: See med rec    T(C): 36.9 (02-29-20 @ 11:00)  HR: 73 (02-29-20 @ 13:00)  BP: 138/60 (02-29-20 @ 13:00)  RR: 19 (02-29-20 @ 13:00)  SpO2: 97% (02-29-20 @ 13:00)  Wt(kg): --    Gen: NAD, more confused than baseline per son  Resp: Unlabored breathing  PE RUE:  Skin intact,    SILT axillary/med/rad/ulnar  +Motor AIN/PIN/Ulnar/Radial/Musc/Median,   +painless elbow/wrist ROM,   shoulder ROM limited 2/2 pain,   2+radial pulse, soft compartments.    Secondary:  No TTP over bony landmarks, SILT BL, ROM intact BL, distal pulses palpable.    Imaging:  XR demonstrating right non-displaced clavicle fracture

## 2020-02-29 NOTE — CONSULT NOTE ADULT - ASSESSMENT
85yMale with R non-displaced clavicle fracture    pain control  NWB in sling  PT/OT  No acute orthopaedic intervention  Ortho to sign off  Please call if you have further question  Can follow up with Dr. Nuñez in 1-2 weeks or upon discharge    Ortho [id number]

## 2020-02-29 NOTE — PHYSICAL THERAPY INITIAL EVALUATION ADULT - GAIT DEVIATIONS NOTED, PT EVAL
increased time in double stance/decreased step length/decreased velocity of limb motion/decreased kinza/decreased weight-shifting ability

## 2020-02-29 NOTE — CONSULT NOTE ADULT - ATTENDING COMMENTS
Pt seen and examined.  Exam and plan as above
S/p mechanical fall with lt sided 4 rib fx and small hemothorax.  Pain control  Pul toileting, F/U CXR to assess for need for tube thoracotomy  PT  Repeat HCT
Hx verified in allscripts

## 2020-02-29 NOTE — PHYSICAL THERAPY INITIAL EVALUATION ADULT - PERTINENT HX OF CURRENT PROBLEM, REHAB EVAL
Pt is 85M admitted 2/28/20 PMHx dementia, pancreatic insufficiency & urinary incontinence; h/o fall(10/2019); presents s/p fall down 3 steps.

## 2020-02-29 NOTE — PHYSICAL THERAPY INITIAL EVALUATION ADULT - PRECAUTIONS/LIMITATIONS, REHAB EVAL
CT BRAIN:No acute intracranial hemorrhage or mass effect.No displaced calvarial fracture.CT CERVICAL SPINE:No acute fracture or traumatic subluxation. No prevertebral soft tissue swelling.Degenerative changes.CT CHEST: Fractures of the left third through the seventh ribs. No pneumothorax is noted. Trace left pleural effusion is noted.XRAY SHOUlDER: No fracture or dislocation of the right shoulder. Nondisplaced fracture in the lateral aspect of the right clavicle./fall precautions

## 2020-02-29 NOTE — PROGRESS NOTE ADULT - ASSESSMENT
ASSESSMENT  85M with a PMH of dementia, pancreatic insufficiency, and urinary incontinence with a recent history of a fall in October 2019 who is presenting today with a fall and left sided rib fractures from 3-7.    PLAN  - Regular diet  - Incentive spirometer  - Cont home medications  - multimodal pain control  - PT eval   - Appreciate SICU Care, anticipate floor transfer    Acute Care Surgery  [id number]

## 2020-02-29 NOTE — PROGRESS NOTE ADULT - SUBJECTIVE AND OBJECTIVE BOX
SICU DAILY PROGRESS NOTE    Patient is a Bulgarian speaking 85 year old male with a PMH of dementia, pancreatic insufficiency, and urinary incontinence with a recent history of a fall in October 2019 who is presenting today with a fall.  Originally it was thought he had CP while on the steps and syncopized and fell down three steps that were carpeted per the EMS, but it was later revealed by the wife that he fell and then had CP.  He had LOC for about 30 seconds and is currently still having some left sided chest pain that is decreased.  He was brought in by EMS.    His history of pancreatic insufficiency was obtained per the wife and he had a "mass" about 9 years ago that was drained through an endoscopy and was told it wasn't cancer.  He used to drink alcohol over three decades ago.  He is not a smoker.    SICU was consulted for respiratory monitoring in this elderly male with 4 rib fractures, trace hemothorax.       24 HOUR EVENTS:  - pt brought up to SICU for respiratory status monitoring  - no acute events overnight    SUBJECTIVE/ROS:  [ x] A ten-point review of systems was otherwise negative except as noted.  [ ] Due to altered mental status/intubation, subjective information were not able to be obtained from the patient. History was obtained, to the extent possible, from review of the chart and collateral sources of information.      NEURO  Exam: awake, alert, oriented  Meds: acetaminophen  IVPB .. 1000 milliGRAM(s) IV Intermittent every 6 hours  donepezil 5 milliGRAM(s) Oral at bedtime  ibuprofen  Tablet. 400 milliGRAM(s) Oral every 6 hours  oxyCODONE    IR 2.5 milliGRAM(s) Oral every 4 hours PRN Moderate Pain (4 - 6)  oxyCODONE    IR 5 milliGRAM(s) Oral every 4 hours PRN Severe Pain (7 - 10)  sertraline 25 milliGRAM(s) Oral daily    [x] Adequacy of sedation and pain control has been assessed and adjusted      RESPIRATORY  RR: 57 (02-29-20 @ 03:00) (17 - 57)  SpO2: 96% (02-29-20 @ 03:00) (94% - 98%)  Wt(kg): --  Exam: unlabored, clear to auscultation bilaterally  Mechanical Ventilation:     [N/A] Extubation Readiness Assessed  Meds:       CARDIOVASCULAR  HR: 71 (02-29-20 @ 03:00) (67 - 84)  BP: 150/66 (02-29-20 @ 03:00) (132/60 - 177/74)  BP(mean): 95 (02-29-20 @ 03:00) (87 - 110)  ABP: --  ABP(mean): --  Wt(kg): --  CVP(cm H2O): --      Exam: regular rate and rhythm  Cardiac Rhythm: sinus  Perfusion     [x]Adequate   [ ]Inadequate  Mentation   [x]Normal       [ ]Reduced  Extremities  [x]Warm         [ ]Cool  Volume Status [ ]Hypervolemic [x]Euvolemic [ ]Hypovolemic  Meds: tamsulosin 0.4 milliGRAM(s) Oral at bedtime        GI/NUTRITION  Exam: soft, nontender, nondistended, incision C/D/I  Diet: regular  Meds: pancrelipase  (CREON 36,000 Lipase Units) 2 Capsule(s) Oral three times a day with meals  polyethylene glycol 3350 17 Gram(s) Oral daily  senna 2 Tablet(s) Oral at bedtime      GENITOURINARY  I&O's Detail    02-28 @ 07:01  -  02-29 @ 04:54  --------------------------------------------------------  IN:    IV PiggyBack: 200 mL    Oral Fluid: 200 mL  Total IN: 400 mL    OUT:    Voided: 600 mL  Total OUT: 600 mL    Total NET: -200 mL        Weight (kg): 57.2 (02-28 @ 19:00)  02-29    138  |  105  |  25<H>  ----------------------------<  113<H>  3.9   |  20<L>  |  0.90    Ca    8.2<L>      29 Feb 2020 00:49  Phos  3.0     02-29  Mg     2.0     02-29    TPro  6.9  /  Alb  3.6  /  TBili  0.4  /  DBili  x   /  AST  17  /  ALT  14  /  AlkPhos  78  02-28    [ ] Vernon catheter, indication: N/A  Meds: cholecalciferol 1000 Unit(s) Oral daily  mirabegron ER 25 milliGRAM(s) Oral daily        HEMATOLOGIC  Meds: enoxaparin Injectable 40 milliGRAM(s) SubCutaneous daily    [x] VTE Prophylaxis                        11.8   6.23  )-----------( 209      ( 29 Feb 2020 00:49 )             36.4     PT/INR - ( 29 Feb 2020 00:49 )   PT: 12.5 sec;   INR: 1.08 ratio         PTT - ( 29 Feb 2020 00:49 )  PTT:28.8 sec  Transfusion     [ ] PRBC   [ ] Platelets   [ ] FFP   [ ] Cryoprecipitate      INFECTIOUS DISEASES  WBC Count: 6.23 K/uL (02-29 @ 00:49)  WBC Count: 6.67 K/uL (02-28 @ 08:58)    RECENT CULTURES:    Meds:       ENDOCRINE  CAPILLARY BLOOD GLUCOSE        Meds: predniSONE   Tablet 2.5 milliGRAM(s) Oral daily        ACCESS DEVICES:  [x ] Peripheral IV  [ ] Central Venous Line	[ ] R	[ ] L	[ ] IJ	[ ] Fem	[ ] SC	Placed:   [ ] Arterial Line		[ ] R	[ ] L	[ ] Fem	[ ] Rad	[ ] Ax	Placed:   [ ] PICC:					[ ] Mediport  [ ] Urinary Catheter, Date Placed:   [x] Necessity of urinary, arterial, and venous catheters discussed    OTHER MEDICATIONS:  chlorhexidine 2% Cloths 1 Application(s) Topical <User Schedule>  lidocaine   Patch 1 Patch Transdermal daily      CODE STATUS: full      IMAGING:  < from: CT Chest No Cont (02.28.20 @ 09:00) >    EXAM:  CT CHEST                            PROCEDURE DATE:  02/28/2020            INTERPRETATION:  Clinical information: Status post fall.    CT scan of the chest was obtained without administration of intravenous contrast.    Few small calcified and noncalcified lymph nodes are present in the pretracheal space, AP window, subcarinal region and the left hilum.     Heart is normal in size. Calcification of the aortic valve and the coronary arteries is noted. No pericardial effusion is noted.     No endobronchial lesions are noted. Minimal scarring is noted at both lung apices. Remaining lungs are clear. Trace left pleural effusion is noted. No pneumothorax is noted.    Below the diaphragm, visualized portions of the abdomen demonstrate smallright renal calcification.     Visualized osseous structures demonstrate fractures of the left third through the seventh ribs.    Impression: Fractures of the left third through the seventh ribs. No pneumothorax is noted. Trace left pleural effusion is noted.                    LASHON BHATIA M.D., ATTENDING RADIOLOGIST  This document has been electronically signed. Feb 28 2020  9:51AM                < end of copied text >

## 2020-02-29 NOTE — PHYSICAL THERAPY INITIAL EVALUATION ADULT - GENERAL OBSERVATIONS, REHAB EVAL
received OOB sitting in chair, A&OX3, confused at times, following simple commands, Maltese speaking with family at bedside translating t/o, admitted s/p fall with left 3-4 rib fx, right clavicle fx, as per SICU resident DIANA JETER in sling, +ICU monitoring.

## 2020-02-29 NOTE — PHYSICAL THERAPY INITIAL EVALUATION ADULT - CRITERIA FOR SKILLED THERAPEUTIC INTERVENTIONS
functional limitations in following categories/rehab potential/impairments found/therapy frequency/anticipated discharge recommendation/predicted duration of therapy intervention/anticipated equipment needs at discharge

## 2020-03-01 PROCEDURE — 71045 X-RAY EXAM CHEST 1 VIEW: CPT | Mod: 26

## 2020-03-01 PROCEDURE — 99232 SBSQ HOSP IP/OBS MODERATE 35: CPT

## 2020-03-01 RX ADMIN — Medication 400 MILLIGRAM(S): at 17:54

## 2020-03-01 RX ADMIN — LIDOCAINE 1 PATCH: 4 CREAM TOPICAL at 20:26

## 2020-03-01 RX ADMIN — TAMSULOSIN HYDROCHLORIDE 0.4 MILLIGRAM(S): 0.4 CAPSULE ORAL at 21:34

## 2020-03-01 RX ADMIN — Medication 650 MILLIGRAM(S): at 18:22

## 2020-03-01 RX ADMIN — Medication 400 MILLIGRAM(S): at 13:03

## 2020-03-01 RX ADMIN — MIRABEGRON 25 MILLIGRAM(S): 50 TABLET, EXTENDED RELEASE ORAL at 13:04

## 2020-03-01 RX ADMIN — Medication 400 MILLIGRAM(S): at 18:24

## 2020-03-01 RX ADMIN — Medication 1000 UNIT(S): at 13:03

## 2020-03-01 RX ADMIN — Medication 650 MILLIGRAM(S): at 17:52

## 2020-03-01 RX ADMIN — Medication 650 MILLIGRAM(S): at 02:40

## 2020-03-01 RX ADMIN — Medication 2.5 MILLIGRAM(S): at 05:33

## 2020-03-01 RX ADMIN — Medication 400 MILLIGRAM(S): at 01:14

## 2020-03-01 RX ADMIN — SERTRALINE 25 MILLIGRAM(S): 25 TABLET, FILM COATED ORAL at 13:04

## 2020-03-01 RX ADMIN — Medication 650 MILLIGRAM(S): at 09:33

## 2020-03-01 RX ADMIN — ENOXAPARIN SODIUM 40 MILLIGRAM(S): 100 INJECTION SUBCUTANEOUS at 13:03

## 2020-03-01 RX ADMIN — LIDOCAINE 1 PATCH: 4 CREAM TOPICAL at 00:21

## 2020-03-01 RX ADMIN — Medication 400 MILLIGRAM(S): at 05:34

## 2020-03-01 RX ADMIN — Medication 400 MILLIGRAM(S): at 13:33

## 2020-03-01 RX ADMIN — DONEPEZIL HYDROCHLORIDE 5 MILLIGRAM(S): 10 TABLET, FILM COATED ORAL at 21:35

## 2020-03-01 RX ADMIN — CHLORHEXIDINE GLUCONATE 1 APPLICATION(S): 213 SOLUTION TOPICAL at 05:33

## 2020-03-01 RX ADMIN — Medication 650 MILLIGRAM(S): at 02:46

## 2020-03-01 RX ADMIN — Medication 2 CAPSULE(S): at 13:03

## 2020-03-01 RX ADMIN — Medication 650 MILLIGRAM(S): at 22:00

## 2020-03-01 RX ADMIN — LIDOCAINE 1 PATCH: 4 CREAM TOPICAL at 13:04

## 2020-03-01 RX ADMIN — SENNA PLUS 2 TABLET(S): 8.6 TABLET ORAL at 21:34

## 2020-03-01 RX ADMIN — Medication 2 CAPSULE(S): at 17:52

## 2020-03-01 RX ADMIN — Medication 650 MILLIGRAM(S): at 09:03

## 2020-03-01 RX ADMIN — Medication 650 MILLIGRAM(S): at 21:34

## 2020-03-01 RX ADMIN — POLYETHYLENE GLYCOL 3350 17 GRAM(S): 17 POWDER, FOR SOLUTION ORAL at 13:04

## 2020-03-01 RX ADMIN — Medication 400 MILLIGRAM(S): at 00:25

## 2020-03-01 RX ADMIN — Medication 400 MILLIGRAM(S): at 05:45

## 2020-03-01 RX ADMIN — Medication 2 CAPSULE(S): at 09:02

## 2020-03-01 NOTE — PROGRESS NOTE ADULT - ASSESSMENT
ASSESSMENT  85M with a PMH of dementia, pancreatic insufficiency, and urinary incontinence with a recent history of a fall in October 2019 who is presenting today with a fall and left sided rib fractures from 3-7 and a R clavicle fracture    PLAN  - Regular diet  - Incentive spirometer  - Cont home medications  - multimodal pain control  - PT eval: GENESIS   - FU Echo    ACS

## 2020-03-01 NOTE — OCCUPATIONAL THERAPY INITIAL EVALUATION ADULT - DIAGNOSIS, OT EVAL
Pt p/w impairments in balance, strength, AROM, coordination, cognition impacting independence with ADLs and functional mobility.

## 2020-03-01 NOTE — OCCUPATIONAL THERAPY INITIAL EVALUATION ADULT - LIVES WITH, PROFILE
Per chart, pt lives in  with spouse and grandson. 3 [address] home. Pt ambulated without AD, but occasionally utilized cane. Pt was mostly independent with ADLs.

## 2020-03-01 NOTE — OCCUPATIONAL THERAPY INITIAL EVALUATION ADULT - RANGE OF MOTION EXAMINATION, UPPER EXTREMITY
Left UE Passive ROM was WNL (within normal limits)/RUE elbow/wrist/hand AROM/PROM WFL/Left UE Active ROM was WNL (within normal limits)

## 2020-03-01 NOTE — OCCUPATIONAL THERAPY INITIAL EVALUATION ADULT - PRECAUTIONS/LIMITATIONS, REHAB EVAL
CT CERVICAL SPINE:No acute fracture or traumatic subluxation. No prevertebral soft tissue swelling.Degenerative changes.CT CHEST: Fractures of the left third through the seventh ribs. No pneumothorax is noted. Trace left pleural effusion is noted.XRAY SHOUlDER: No fracture or dislocation of the right shoulder. Nondisplaced fracture in the lateral aspect of the right clavicle.; fall precautions CT CERVICAL SPINE:No acute fracture or traumatic subluxation. No prevertebral soft tissue swelling.Degenerative changes.CT CHEST: Fractures of the left third through the seventh ribs. No pneumothorax is noted. Trace left pleural effusion is noted.XRAY SHOUlDER: No fracture or dislocation of the right shoulder. Nondisplaced fracture in the lateral aspect of the right clavicle.; fall precautions/fall precautions

## 2020-03-01 NOTE — PROGRESS NOTE ADULT - SUBJECTIVE AND OBJECTIVE BOX
SUBJECTIVE: Patient seen and examined this morning. No acute events overnight. Pain well controlled. Tolerating diet. Denies N/V/D/F/C.       MEDICATIONS  (STANDING):  acetaminophen   Tablet .. 650 milliGRAM(s) Oral every 6 hours  chlorhexidine 2% Cloths 1 Application(s) Topical <User Schedule>  cholecalciferol 1000 Unit(s) Oral daily  donepezil 5 milliGRAM(s) Oral at bedtime  enoxaparin Injectable 40 milliGRAM(s) SubCutaneous daily  ibuprofen  Tablet. 400 milliGRAM(s) Oral every 6 hours  lidocaine   Patch 1 Patch Transdermal daily  mirabegron ER 25 milliGRAM(s) Oral daily  pancrelipase  (CREON 36,000 Lipase Units) 2 Capsule(s) Oral three times a day with meals  polyethylene glycol 3350 17 Gram(s) Oral daily  predniSONE   Tablet 2.5 milliGRAM(s) Oral daily  senna 2 Tablet(s) Oral at bedtime  sertraline 25 milliGRAM(s) Oral daily  tamsulosin 0.4 milliGRAM(s) Oral at bedtime    MEDICATIONS  (PRN):  oxyCODONE    IR 2.5 milliGRAM(s) Oral every 4 hours PRN Moderate Pain (4 - 6)  oxyCODONE    IR 5 milliGRAM(s) Oral every 4 hours PRN Severe Pain (7 - 10)      OBJECTIVE:    Vital Signs Last 24 Hrs  T(C): 36.7 (01 Mar 2020 09:18), Max: 37 (2020 21:43)  T(F): 98 (01 Mar 2020 09:18), Max: 98.6 (2020 21:43)  HR: 90 (01 Mar 2020 09:18) (71 - 98)  BP: 125/69 (01 Mar 2020 09:18) (114/70 - 161/79)  BP(mean): 83 (2020 16:00) (83 - 98)  RR: 18 (01 Mar 2020 09:18) (18 - 26)  SpO2: 95% (01 Mar 2020 09:18) (92% - 98%)    General: NAD  Neck: Supple  Chest: non-labored breathing, no respiratory distress  CV: Pulse regular presently  Abdomen: Soft, non-tender, non-distended  Extremities: warm and well perfused, RUE sling intact    I&O's Summary    :01  -  01 Mar 2020 07:00  --------------------------------------------------------  IN: 620 mL / OUT: 700 mL / NET: -80 mL    01 Mar 2020 07:  -  01 Mar 2020 11:39  --------------------------------------------------------  IN: 300 mL / OUT: 0 mL / NET: 300 mL      I&O's Detail    :  -  01 Mar 2020 07:00  --------------------------------------------------------  IN:    Oral Fluid: 620 mL  Total IN: 620 mL    OUT:    Incontinent per Condom Catheter: 700 mL  Total OUT: 700 mL    Total NET: -80 mL      01 Mar 2020 07:  -  01 Mar 2020 11:39  --------------------------------------------------------  IN:    Oral Fluid: 300 mL  Total IN: 300 mL    OUT:  Total OUT: 0 mL    Total NET: 300 mL            LABS:                        11.8   6.23  )-----------( 209      ( 2020 00:49 )             36.4     02-29    138  |  105  |  25<H>  ----------------------------<  113<H>  3.9   |  20<L>  |  0.90    Ca    8.2<L>      2020 00:49  Phos  3.0     02-29  Mg     2.0     02-29      PT/INR - ( 2020 00:49 )   PT: 12.5 sec;   INR: 1.08 ratio         PTT - ( 2020 00:49 )  PTT:28.8 sec  Urinalysis Basic - ( 2020 06:11 )    Color: Light Yellow / Appearance: Clear / S.024 / pH: x  Gluc: x / Ketone: Trace  / Bili: Negative / Urobili: Negative   Blood: x / Protein: Negative / Nitrite: Negative   Leuk Esterase: Negative / RBC: x / WBC x   Sq Epi: x / Non Sq Epi: x / Bacteria: x        RADIOLOGY & ADDITIONAL STUDIES:

## 2020-03-02 ENCOUNTER — TRANSCRIPTION ENCOUNTER (OUTPATIENT)
Age: 85
End: 2020-03-02

## 2020-03-02 PROCEDURE — 99232 SBSQ HOSP IP/OBS MODERATE 35: CPT

## 2020-03-02 PROCEDURE — 93306 TTE W/DOPPLER COMPLETE: CPT | Mod: 26

## 2020-03-02 RX ADMIN — LIDOCAINE 1 PATCH: 4 CREAM TOPICAL at 20:37

## 2020-03-02 RX ADMIN — Medication 400 MILLIGRAM(S): at 11:39

## 2020-03-02 RX ADMIN — Medication 2 CAPSULE(S): at 11:43

## 2020-03-02 RX ADMIN — SENNA PLUS 2 TABLET(S): 8.6 TABLET ORAL at 22:17

## 2020-03-02 RX ADMIN — Medication 400 MILLIGRAM(S): at 05:42

## 2020-03-02 RX ADMIN — Medication 650 MILLIGRAM(S): at 22:17

## 2020-03-02 RX ADMIN — SERTRALINE 25 MILLIGRAM(S): 25 TABLET, FILM COATED ORAL at 11:49

## 2020-03-02 RX ADMIN — Medication 1000 UNIT(S): at 11:39

## 2020-03-02 RX ADMIN — ENOXAPARIN SODIUM 40 MILLIGRAM(S): 100 INJECTION SUBCUTANEOUS at 11:39

## 2020-03-02 RX ADMIN — POLYETHYLENE GLYCOL 3350 17 GRAM(S): 17 POWDER, FOR SOLUTION ORAL at 11:49

## 2020-03-02 RX ADMIN — TAMSULOSIN HYDROCHLORIDE 0.4 MILLIGRAM(S): 0.4 CAPSULE ORAL at 22:17

## 2020-03-02 RX ADMIN — Medication 650 MILLIGRAM(S): at 04:00

## 2020-03-02 RX ADMIN — Medication 650 MILLIGRAM(S): at 15:46

## 2020-03-02 RX ADMIN — CHLORHEXIDINE GLUCONATE 1 APPLICATION(S): 213 SOLUTION TOPICAL at 10:02

## 2020-03-02 RX ADMIN — Medication 400 MILLIGRAM(S): at 06:12

## 2020-03-02 RX ADMIN — Medication 650 MILLIGRAM(S): at 15:20

## 2020-03-02 RX ADMIN — Medication 650 MILLIGRAM(S): at 22:45

## 2020-03-02 RX ADMIN — LIDOCAINE 1 PATCH: 4 CREAM TOPICAL at 13:37

## 2020-03-02 RX ADMIN — DONEPEZIL HYDROCHLORIDE 5 MILLIGRAM(S): 10 TABLET, FILM COATED ORAL at 22:17

## 2020-03-02 RX ADMIN — Medication 650 MILLIGRAM(S): at 03:43

## 2020-03-02 RX ADMIN — LIDOCAINE 1 PATCH: 4 CREAM TOPICAL at 00:34

## 2020-03-02 RX ADMIN — Medication 2 CAPSULE(S): at 17:24

## 2020-03-02 RX ADMIN — MIRABEGRON 25 MILLIGRAM(S): 50 TABLET, EXTENDED RELEASE ORAL at 13:37

## 2020-03-02 RX ADMIN — Medication 650 MILLIGRAM(S): at 09:58

## 2020-03-02 RX ADMIN — Medication 2.5 MILLIGRAM(S): at 05:42

## 2020-03-02 RX ADMIN — Medication 2 CAPSULE(S): at 09:58

## 2020-03-02 NOTE — DISCHARGE NOTE PROVIDER - HOSPITAL COURSE
Patient is a Scottish speaking 85 year old male with a PMH of dementia, pancreatic insufficiency, and urinary incontinence with a recent history of a fall in October 2019 who is presenting today with a fall.  Originally it was thought he had CP while on the steps and syncopized and fell down three steps that were carpeted per the EMS, but it was later revealed by the wife that he fell and then had CP.  He had LOC for about 30 seconds and is currently still having some left sided chest pain that is decreased.  He was brought in by EMS.        His history of pancreatic insufficiency was obtained per the wife and he had a "mass" about 9 years ago that was drained through an endoscopy and was told it wasn't cancer.  He used to drink alcohol over three decades ago.  He is not a smoker.        SICU was consulted for respiratory monitoring in this elderly male with 4 rib fractures, trace hemothorax.         On my evaluation patient c/o left sided chest pain. He was breathing comfortably on RA, saturating upper 90s. He was able to pull >1L on incentive spirometry. Pt brought up to SICU for respiratory status monitoring, no acute events overnight. Hospitalist consulted for medical co-management.     PT/OT eval: GENESIS.  Ortho eval: outpt f/u.  Patient was stable and transferred from SICU to [facility]. At the time of discharge, the patient was hemodynamically stable, was tolerating PO diet, was voiding urine and passing stool, was ambulating, and was comfortable with adequate pain control. The patient was instructed to follow up with PMD, Ortho within 1-2 weeks after discharge from the hospital. The patient felt comfortable with discharge. The patient was discharged to rehab. The patient had no other issues.

## 2020-03-02 NOTE — DISCHARGE NOTE PROVIDER - NSDCMRMEDTOKEN_GEN_ALL_CORE_FT
Calcium 600+D oral tablet: 1 tab(s) orally once a day  Creon 24,000 units oral delayed release capsule: 3 tab(s) orally 3 times a day, As Needed with meals  Creon 24,000 units oral delayed release capsule: 3 cap(s) orally 3 times a day  donepezil 5 mg oral tablet: 1 tab(s) orally once a day (at bedtime)  Multiple Vitamins oral tablet: 1 tab(s) orally once a day  Myrbetriq 25 mg oral tablet, extended release: 1 tab(s) orally once a day  Percocet 5/325 oral tablet: 1 tab(s) orally every 8 hours -for moderate pain - for severe pain MDD:3   predniSONE 2.5 mg oral tablet: 1 tab(s) orally once a day  predniSONE 2.5 mg oral tablet: 1 tab(s) orally once a day  sertraline 25 mg oral tablet: 1/2 tab(s) orally once a day  tamsulosin 0.4 mg oral capsule: 1 cap(s) orally once a day acetaminophen 325 mg oral tablet: 2 tab(s) orally every 6 hours  Calcium 600+D oral tablet: 1 tab(s) orally once a day  cholecalciferol oral tablet: 1000 unit(s) orally once a day  Creon 24,000 units oral delayed release capsule: 3 cap(s) orally 3 times a day  donepezil 5 mg oral tablet: 1 tab(s) orally once a day (at bedtime)  lidocaine 5% topical film: Apply topically to affected area once a day  Multiple Vitamins oral tablet: 1 tab(s) orally once a day  Myrbetriq 25 mg oral tablet, extended release: 1 tab(s) orally once a day  oxyCODONE: 2.5 milligram(s) orally every 4 hours, As Needed severe pain   polyethylene glycol 3350 oral powder for reconstitution: 17 gram(s) orally once a day  predniSONE 2.5 mg oral tablet: 1 tab(s) orally once a day  senna oral tablet: 2 tab(s) orally once a day (at bedtime)  sertraline 25 mg oral tablet: 1/2 tab(s) orally once a day  tamsulosin 0.4 mg oral capsule: 1 cap(s) orally once a day

## 2020-03-02 NOTE — PROGRESS NOTE ADULT - PROBLEM SELECTOR PLAN 1
Found to have left 3-7 rib fractures   Incentive spirometry   Pain control with tylenol and lidocaine patch   MInimize use of opiates in elderly   PT eval rec for GENESIS

## 2020-03-02 NOTE — DISCHARGE NOTE PROVIDER - CARE PROVIDERS DIRECT ADDRESSES
[email or web address]

## 2020-03-02 NOTE — PROGRESS NOTE ADULT - ASSESSMENT
ASSESSMENT  85M with a PMH of dementia, pancreatic insufficiency, and urinary incontinence with a recent history of a fall in October 2019 who is presenting today with a fall and left sided rib fractures from 3-7 and a R clavicle fracture    PLAN  - Regular diet  - Incentive spirometer  - Cont home medications  - multimodal pain control  - PT eval: GENESIS   - FU Echo   - Dispo: stable for dc once echo completed    ACS  [id number]

## 2020-03-02 NOTE — PROGRESS NOTE ADULT - SUBJECTIVE AND OBJECTIVE BOX
SUBJECTIVE: Patient seen and examined this morning. No acute events overnight. Pain well controlled. Tolerating diet. Denies N/V/D/F/C. Feels ready to go home, awaiting echo.      MEDICATIONS  (STANDING):  acetaminophen   Tablet .. 650 milliGRAM(s) Oral every 6 hours  chlorhexidine 2% Cloths 1 Application(s) Topical <User Schedule>  cholecalciferol 1000 Unit(s) Oral daily  donepezil 5 milliGRAM(s) Oral at bedtime  enoxaparin Injectable 40 milliGRAM(s) SubCutaneous daily  ibuprofen  Tablet. 400 milliGRAM(s) Oral every 6 hours  lidocaine   Patch 1 Patch Transdermal daily  mirabegron ER 25 milliGRAM(s) Oral daily  pancrelipase  (CREON 36,000 Lipase Units) 2 Capsule(s) Oral three times a day with meals  polyethylene glycol 3350 17 Gram(s) Oral daily  predniSONE   Tablet 2.5 milliGRAM(s) Oral daily  senna 2 Tablet(s) Oral at bedtime  sertraline 25 milliGRAM(s) Oral daily  tamsulosin 0.4 milliGRAM(s) Oral at bedtime    MEDICATIONS  (PRN):  oxyCODONE    IR 2.5 milliGRAM(s) Oral every 4 hours PRN Moderate Pain (4 - 6)  oxyCODONE    IR 5 milliGRAM(s) Oral every 4 hours PRN Severe Pain (7 - 10)      OBJECTIVE:  Vital Signs Last 24 Hrs  T(C): 36.3 (02 Mar 2020 05:38), Max: 36.8 (01 Mar 2020 21:08)  T(F): 97.4 (02 Mar 2020 05:38), Max: 98.3 (01 Mar 2020 21:08)  HR: 90 (02 Mar 2020 05:38) (74 - 92)  BP: 156/77 (02 Mar 2020 05:38) (125/69 - 157/73)  BP(mean): --  RR: 18 (02 Mar 2020 05:38) (18 - 18)  SpO2: 94% (02 Mar 2020 05:38) (94% - 95%)      Physical Exam:  General: NAD  Neck: Supple  Chest: non-labored breathing, no respiratory distress  CV: Pulse regular presently  Abdomen: Soft, non-tender, non-distended  Extremities: warm and well perfused, RUE sling intact    I&O's Detail    01 Mar 2020 07:01  -  02 Mar 2020 07:00  --------------------------------------------------------  IN:    Oral Fluid: 1070 mL  Total IN: 1070 mL    OUT:    Incontinent per Condom Catheter: 1350 mL  Total OUT: 1350 mL    Total NET: -280 mL            LABS:                          11.8   6.23  )-----------( 209      ( 2020 00:49 )             36.4         138  |  105  |  25<H>  ----------------------------<  113<H>  3.9   |  20<L>  |  0.90    Ca    8.2<L>      2020 00:49  Phos  3.0       Mg     2.0           PT/INR - ( 2020 00:49 )   PT: 12.5 sec;   INR: 1.08 ratio         PTT - ( 2020 00:49 )  PTT:28.8 sec  Urinalysis Basic - ( 2020 06:11 )    Color: Light Yellow / Appearance: Clear / S.024 / pH: x  Gluc: x / Ketone: Trace  / Bili: Negative / Urobili: Negative   Blood: x / Protein: Negative / Nitrite: Negative   Leuk Esterase: Negative / RBC: x / WBC x   Sq Epi: x / Non Sq Epi: x / Bacteria: x        RADIOLOGY & ADDITIONAL STUDIES:

## 2020-03-02 NOTE — PROGRESS NOTE ADULT - ASSESSMENT
85 year old male with a PMH of dementia, BPH, pancreatic insufficiency, and urinary incontinence with a recent history of a fall in October 2019 p/w fall found to have Left 3-7 rib fractures and right sided clavicular fracture, now awaiting GENESIS.

## 2020-03-02 NOTE — DISCHARGE NOTE PROVIDER - CARE PROVIDER_API CALL
Nelson Hodgson (MD)  Surgery; Surgical Critical Care  [address], [address], [place] [address]  Phone: [phone]  Fax: [phone]  Follow Up Time:     Arnulfo Nuñez)  Orthopaedic Surgery  [address], [address]  [place] [address]  Phone: [phone]  Fax: [phone]  Follow Up Time:

## 2020-03-02 NOTE — PROGRESS NOTE ADULT - PROBLEM SELECTOR PLAN 4
Hx of multiple falls likely in the setting of dementia   started vitamin d 1000u/day   fall precautions

## 2020-03-02 NOTE — DISCHARGE NOTE PROVIDER - NSDCCPCAREPLAN_GEN_ALL_CORE_FT
PRINCIPAL DISCHARGE DIAGNOSIS  Diagnosis: Rib fractures  Assessment and Plan of Treatment: Notify your physician/surgeon and return to ER for difficulty breathing, shortness of breath, for temperatures greater than 101, chills sweats, pain not controlled with pain medications, persistent nausea and vomiting, or acutely concerning matters to you, that may require urgent medical attention.   Please follow up with your Trauma Doctor only if needed at [address] [place] [id number] , phone #[phone].      SECONDARY DISCHARGE DIAGNOSES  Diagnosis: Clavicular fracture  Assessment and Plan of Treatment: Non-weight bearing.   Continue to wear sling.  Follow up with Dr. Nuñez in 1-2 weeks or upon discharge    Diagnosis: Fall  Assessment and Plan of Treatment: syncopy work up done.   Please follow up with your PMD within 1 week upon discharge regarding your hospitalization.

## 2020-03-02 NOTE — PROGRESS NOTE ADULT - PROBLEM SELECTOR PLAN 2
pt with syncopal episode ?chest pain before the event, delta trop unremarkable  EKG nsr 78 no st/t changes   TTE grossly normal, no further cardiac workup needed

## 2020-03-02 NOTE — PROGRESS NOTE ADULT - SUBJECTIVE AND OBJECTIVE BOX
Patient is a 85y old  Male who presents with a chief complaint of fall with rib fractures (02 Mar 2020 10:09)        SUBJECTIVE / OVERNIGHT EVENTS:  overnight no acute events.  no n/v/f/chills, cp, sob, abd pain.  states he feels improved overall.    CAPILLARY BLOOD GLUCOSE        I&O's Summary    01 Mar 2020 07:01  -  02 Mar 2020 07:00  --------------------------------------------------------  IN: 1070 mL / OUT: 1350 mL / NET: -280 mL    02 Mar 2020 07:01  -  02 Mar 2020 13:10  --------------------------------------------------------  IN: 360 mL / OUT: 150 mL / NET: 210 mL        Vital Signs Last 24 Hrs  T(C): 37 (02 Mar 2020 10:04), Max: 37 (02 Mar 2020 10:04)  T(F): 98.6 (02 Mar 2020 10:04), Max: 98.6 (02 Mar 2020 10:04)  HR: 92 (02 Mar 2020 10:04) (74 - 92)  BP: 147/81 (02 Mar 2020 10:04) (125/74 - 157/73)  BP(mean): --  RR: 18 (02 Mar 2020 10:04) (18 - 18)  SpO2: 94% (02 Mar 2020 10:04) (94% - 95%)    PHYSICAL EXAM:  GENERAL:  Well appearing, [place]ijani speaking, red hair, in NAD  HEAD:  NCAT  EYES: PERRLA, conjunctiva clear  NECK: Supple, No JVD  CHEST/LUNG: CTA B/L. No w/r/r.  HEART: Reg rate. Normal S1, S2. No m/r/g.   ABDOMEN: SNTND.   EXTREMITIES:  2+ Peripheral Pulses, No clubbing, cyanosis, edema.  r arm sling  PSYCH:appropriate affect      LABS:                      RADIOLOGY & ADDITIONAL TESTS:  Telemetry reviewed:  Imaging Personally Reviewed:  Consultant(s) Notes Reviewed:    Care Discussed with Consultants/Other Providers: Floor NP/PA    MEDICATIONS  (STANDING):  acetaminophen   Tablet .. 650 milliGRAM(s) Oral every 6 hours  chlorhexidine 2% Cloths 1 Application(s) Topical <User Schedule>  cholecalciferol 1000 Unit(s) Oral daily  donepezil 5 milliGRAM(s) Oral at bedtime  enoxaparin Injectable 40 milliGRAM(s) SubCutaneous daily  lidocaine   Patch 1 Patch Transdermal daily  mirabegron ER 25 milliGRAM(s) Oral daily  pancrelipase  (CREON 36,000 Lipase Units) 2 Capsule(s) Oral three times a day with meals  polyethylene glycol 3350 17 Gram(s) Oral daily  predniSONE   Tablet 2.5 milliGRAM(s) Oral daily  senna 2 Tablet(s) Oral at bedtime  sertraline 25 milliGRAM(s) Oral daily  tamsulosin 0.4 milliGRAM(s) Oral at bedtime    MEDICATIONS  (PRN):  oxyCODONE    IR 2.5 milliGRAM(s) Oral every 4 hours PRN Moderate Pain (4 - 6)  oxyCODONE    IR 5 milliGRAM(s) Oral every 4 hours PRN Severe Pain (7 - 10)

## 2020-03-03 PROCEDURE — 99232 SBSQ HOSP IP/OBS MODERATE 35: CPT

## 2020-03-03 RX ORDER — LIPASE/PROTEASE/AMYLASE 16-48-48K
3 CAPSULE,DELAYED RELEASE (ENTERIC COATED) ORAL
Qty: 0 | Refills: 0 | DISCHARGE

## 2020-03-03 RX ORDER — LIDOCAINE 4 G/100G
1 CREAM TOPICAL
Qty: 0 | Refills: 0 | DISCHARGE
Start: 2020-03-03

## 2020-03-03 RX ORDER — CHOLECALCIFEROL (VITAMIN D3) 125 MCG
1000 CAPSULE ORAL
Qty: 0 | Refills: 0 | DISCHARGE
Start: 2020-03-03

## 2020-03-03 RX ORDER — POLYETHYLENE GLYCOL 3350 17 G/17G
17 POWDER, FOR SOLUTION ORAL
Qty: 0 | Refills: 0 | DISCHARGE
Start: 2020-03-03

## 2020-03-03 RX ORDER — OXYCODONE HYDROCHLORIDE 5 MG/1
2.5 TABLET ORAL
Qty: 0 | Refills: 0 | DISCHARGE
Start: 2020-03-03

## 2020-03-03 RX ORDER — ACETAMINOPHEN 500 MG
2 TABLET ORAL
Qty: 0 | Refills: 0 | DISCHARGE
Start: 2020-03-03

## 2020-03-03 RX ORDER — SENNA PLUS 8.6 MG/1
2 TABLET ORAL
Qty: 0 | Refills: 0 | DISCHARGE
Start: 2020-03-03

## 2020-03-03 RX ADMIN — LIDOCAINE 1 PATCH: 4 CREAM TOPICAL at 12:40

## 2020-03-03 RX ADMIN — CHLORHEXIDINE GLUCONATE 1 APPLICATION(S): 213 SOLUTION TOPICAL at 09:42

## 2020-03-03 RX ADMIN — Medication 650 MILLIGRAM(S): at 12:37

## 2020-03-03 RX ADMIN — Medication 2 CAPSULE(S): at 09:41

## 2020-03-03 RX ADMIN — TAMSULOSIN HYDROCHLORIDE 0.4 MILLIGRAM(S): 0.4 CAPSULE ORAL at 21:23

## 2020-03-03 RX ADMIN — SENNA PLUS 2 TABLET(S): 8.6 TABLET ORAL at 21:22

## 2020-03-03 RX ADMIN — DONEPEZIL HYDROCHLORIDE 5 MILLIGRAM(S): 10 TABLET, FILM COATED ORAL at 21:22

## 2020-03-03 RX ADMIN — LIDOCAINE 1 PATCH: 4 CREAM TOPICAL at 04:07

## 2020-03-03 RX ADMIN — Medication 1000 UNIT(S): at 12:36

## 2020-03-03 RX ADMIN — LIDOCAINE 1 PATCH: 4 CREAM TOPICAL at 19:25

## 2020-03-03 RX ADMIN — Medication 2 CAPSULE(S): at 17:59

## 2020-03-03 RX ADMIN — Medication 650 MILLIGRAM(S): at 23:00

## 2020-03-03 RX ADMIN — ENOXAPARIN SODIUM 40 MILLIGRAM(S): 100 INJECTION SUBCUTANEOUS at 12:36

## 2020-03-03 RX ADMIN — Medication 650 MILLIGRAM(S): at 18:00

## 2020-03-03 RX ADMIN — SERTRALINE 25 MILLIGRAM(S): 25 TABLET, FILM COATED ORAL at 12:36

## 2020-03-03 RX ADMIN — Medication 650 MILLIGRAM(S): at 06:15

## 2020-03-03 RX ADMIN — Medication 2 CAPSULE(S): at 13:17

## 2020-03-03 RX ADMIN — Medication 650 MILLIGRAM(S): at 13:10

## 2020-03-03 RX ADMIN — MIRABEGRON 25 MILLIGRAM(S): 50 TABLET, EXTENDED RELEASE ORAL at 12:36

## 2020-03-03 RX ADMIN — Medication 650 MILLIGRAM(S): at 05:54

## 2020-03-03 RX ADMIN — Medication 2.5 MILLIGRAM(S): at 05:54

## 2020-03-03 NOTE — PROGRESS NOTE ADULT - SUBJECTIVE AND OBJECTIVE BOX
SUBJECTIVE: Patient seen and examined this morning. No acute events overnight. Pain well controlled. Tolerating diet. Denies N/V/D/F/C.     MEDICATIONS  (STANDING):  acetaminophen   Tablet .. 650 milliGRAM(s) Oral every 6 hours  chlorhexidine 2% Cloths 1 Application(s) Topical <User Schedule>  cholecalciferol 1000 Unit(s) Oral daily  donepezil 5 milliGRAM(s) Oral at bedtime  enoxaparin Injectable 40 milliGRAM(s) SubCutaneous daily  lidocaine   Patch 1 Patch Transdermal daily  mirabegron ER 25 milliGRAM(s) Oral daily  pancrelipase  (CREON 36,000 Lipase Units) 2 Capsule(s) Oral three times a day with meals  polyethylene glycol 3350 17 Gram(s) Oral daily  predniSONE   Tablet 2.5 milliGRAM(s) Oral daily  senna 2 Tablet(s) Oral at bedtime  sertraline 25 milliGRAM(s) Oral daily  tamsulosin 0.4 milliGRAM(s) Oral at bedtime    MEDICATIONS  (PRN):  oxyCODONE    IR 2.5 milliGRAM(s) Oral every 4 hours PRN Moderate Pain (4 - 6)  oxyCODONE    IR 5 milliGRAM(s) Oral every 4 hours PRN Severe Pain (7 - 10)      OBJECTIVE:    Vital Signs Last 24 Hrs  T(C): 36.7 (03 Mar 2020 06:28), Max: 37.4 (02 Mar 2020 14:53)  T(F): 98.1 (03 Mar 2020 06:28), Max: 99.3 (02 Mar 2020 14:53)  HR: 88 (03 Mar 2020 06:28) (82 - 92)  BP: 135/75 (03 Mar 2020 06:28) (125/66 - 147/81)  BP(mean): --  RR: 18 (03 Mar 2020 06:28) (17 - 18)  SpO2: 93% (03 Mar 2020 06:28) (93% - 96%)    General: NAD  Chest: non-labored breathing, no respiratory distress  CV: Pulse regular presently  Abdomen: Soft, non-tender, non-distended  Extremities: warm and well perfused    I&O's Summary    02 Mar 2020 07:01  -  03 Mar 2020 07:00  --------------------------------------------------------  IN: 1210 mL / OUT: 1360 mL / NET: -150 mL      I&O's Detail    02 Mar 2020 07:01  -  03 Mar 2020 07:00  --------------------------------------------------------  IN:    Oral Fluid: 1210 mL  Total IN: 1210 mL    OUT:    Incontinent per Condom Catheter: 1360 mL  Total OUT: 1360 mL    Total NET: -150 mL

## 2020-03-03 NOTE — PROGRESS NOTE ADULT - ASSESSMENT
A/P: 85M with a PMH of dementia, pancreatic insufficiency, and urinary incontinence with a recent history of a fall in October 2019 who is presenting today with a fall and left sided rib fractures from 3-7 and a R clavicle fracture    - Regular diet  - Incentive spirometer  - Cont home medications  - multimodal pain control  - PT    - Dispo: [facility]

## 2020-03-03 NOTE — PROGRESS NOTE ADULT - SUBJECTIVE AND OBJECTIVE BOX
TRAUMA SURGERY [facility] MEDICINE PROGRESS NOTE    Patient is a 85y old  Male who presents with a chief complaint of fall with rib fractures (03 Mar 2020 09:36)    SUBJECTIVE / OVERNIGHT EVENTS:  overnight no acute events.  no n/v/f/chills, cp, sob, abd pain.    CAPILLARY BLOOD GLUCOSE        I&O's Summary    02 Mar 2020 07:01  -  03 Mar 2020 07:00  --------------------------------------------------------  IN: 1210 mL / OUT: 1360 mL / NET: -150 mL    03 Mar 2020 07:01  -  03 Mar 2020 13:04  --------------------------------------------------------  IN: 240 mL / OUT: 300 mL / NET: -60 mL        Vital Signs Last 24 Hrs  T(C): 37.2 (03 Mar 2020 09:32), Max: 37.4 (02 Mar 2020 14:53)  T(F): 98.9 (03 Mar 2020 09:32), Max: 99.3 (02 Mar 2020 14:53)  HR: 81 (03 Mar 2020 09:32) (81 - 92)  BP: 120/73 (03 Mar 2020 09:32) (120/73 - 143/74)  BP(mean): --  RR: 18 (03 Mar 2020 09:32) (17 - 18)  SpO2: 95% (03 Mar 2020 09:32) (93% - 96%)    PHYSICAL EXAM:  GENERAL:  Well appearing, Surinamese speaking, red hair, in NAD  HEAD:  NCAT  EYES: PERRLA, conjunctiva clear  NECK: Supple, No JVD  CHEST/LUNG: CTA B/L. No w/r/r.  HEART: Reg rate. Normal S1, S2. No m/r/g.   ABDOMEN: SNTND.   EXTREMITIES:  2+ Peripheral Pulses, No clubbing, cyanosis, edema.  r arm sling  PSYCH: appropriate affect    LABS:      MEDICATIONS  (STANDING):  acetaminophen   Tablet .. 650 milliGRAM(s) Oral every 6 hours  chlorhexidine 2% Cloths 1 Application(s) Topical <User Schedule>  cholecalciferol 1000 Unit(s) Oral daily  donepezil 5 milliGRAM(s) Oral at bedtime  enoxaparin Injectable 40 milliGRAM(s) SubCutaneous daily  lidocaine   Patch 1 Patch Transdermal daily  mirabegron ER 25 milliGRAM(s) Oral daily  pancrelipase  (CREON 36,000 Lipase Units) 2 Capsule(s) Oral three times a day with meals  polyethylene glycol 3350 17 Gram(s) Oral daily  predniSONE   Tablet 2.5 milliGRAM(s) Oral daily  senna 2 Tablet(s) Oral at bedtime  sertraline 25 milliGRAM(s) Oral daily  tamsulosin 0.4 milliGRAM(s) Oral at bedtime    MEDICATIONS  (PRN):  oxyCODONE    IR 2.5 milliGRAM(s) Oral every 4 hours PRN Moderate Pain (4 - 6)  oxyCODONE    IR 5 milliGRAM(s) Oral every 4 hours PRN Severe Pain (7 - 10)

## 2020-03-04 ENCOUNTER — TRANSCRIPTION ENCOUNTER (OUTPATIENT)
Age: 85
End: 2020-03-04

## 2020-03-04 VITALS
TEMPERATURE: 98 F | HEART RATE: 78 BPM | OXYGEN SATURATION: 94 % | SYSTOLIC BLOOD PRESSURE: 148 MMHG | DIASTOLIC BLOOD PRESSURE: 78 MMHG | RESPIRATION RATE: 18 BRPM

## 2020-03-04 PROCEDURE — 99232 SBSQ HOSP IP/OBS MODERATE 35: CPT

## 2020-03-04 PROCEDURE — 72170 X-RAY EXAM OF PELVIS: CPT

## 2020-03-04 PROCEDURE — 85027 COMPLETE CBC AUTOMATED: CPT

## 2020-03-04 PROCEDURE — 85610 PROTHROMBIN TIME: CPT

## 2020-03-04 PROCEDURE — 99285 EMERGENCY DEPT VISIT HI MDM: CPT

## 2020-03-04 PROCEDURE — 84100 ASSAY OF PHOSPHORUS: CPT

## 2020-03-04 PROCEDURE — 86900 BLOOD TYPING SEROLOGIC ABO: CPT

## 2020-03-04 PROCEDURE — 86901 BLOOD TYPING SEROLOGIC RH(D): CPT

## 2020-03-04 PROCEDURE — 71045 X-RAY EXAM CHEST 1 VIEW: CPT

## 2020-03-04 PROCEDURE — 86850 RBC ANTIBODY SCREEN: CPT

## 2020-03-04 PROCEDURE — 83735 ASSAY OF MAGNESIUM: CPT

## 2020-03-04 PROCEDURE — 81003 URINALYSIS AUTO W/O SCOPE: CPT

## 2020-03-04 PROCEDURE — 80053 COMPREHEN METABOLIC PANEL: CPT

## 2020-03-04 PROCEDURE — 84484 ASSAY OF TROPONIN QUANT: CPT

## 2020-03-04 PROCEDURE — 97116 GAIT TRAINING THERAPY: CPT

## 2020-03-04 PROCEDURE — 71250 CT THORAX DX C-: CPT

## 2020-03-04 PROCEDURE — 93005 ELECTROCARDIOGRAM TRACING: CPT

## 2020-03-04 PROCEDURE — 99238 HOSP IP/OBS DSCHRG MGMT 30/<: CPT

## 2020-03-04 PROCEDURE — 80307 DRUG TEST PRSMV CHEM ANLYZR: CPT

## 2020-03-04 PROCEDURE — 85730 THROMBOPLASTIN TIME PARTIAL: CPT

## 2020-03-04 PROCEDURE — 97165 OT EVAL LOW COMPLEX 30 MIN: CPT

## 2020-03-04 PROCEDURE — 93306 TTE W/DOPPLER COMPLETE: CPT

## 2020-03-04 PROCEDURE — 70450 CT HEAD/BRAIN W/O DYE: CPT

## 2020-03-04 PROCEDURE — 73030 X-RAY EXAM OF SHOULDER: CPT

## 2020-03-04 PROCEDURE — 80048 BASIC METABOLIC PNL TOTAL CA: CPT

## 2020-03-04 PROCEDURE — 73000 X-RAY EXAM OF COLLAR BONE: CPT

## 2020-03-04 PROCEDURE — 97530 THERAPEUTIC ACTIVITIES: CPT

## 2020-03-04 PROCEDURE — 97162 PT EVAL MOD COMPLEX 30 MIN: CPT

## 2020-03-04 PROCEDURE — 72125 CT NECK SPINE W/O DYE: CPT

## 2020-03-04 RX ADMIN — Medication 650 MILLIGRAM(S): at 05:36

## 2020-03-04 RX ADMIN — Medication 2 CAPSULE(S): at 08:22

## 2020-03-04 RX ADMIN — MIRABEGRON 25 MILLIGRAM(S): 50 TABLET, EXTENDED RELEASE ORAL at 12:30

## 2020-03-04 RX ADMIN — LIDOCAINE 1 PATCH: 4 CREAM TOPICAL at 12:31

## 2020-03-04 RX ADMIN — Medication 2 CAPSULE(S): at 12:30

## 2020-03-04 RX ADMIN — SERTRALINE 25 MILLIGRAM(S): 25 TABLET, FILM COATED ORAL at 12:30

## 2020-03-04 RX ADMIN — LIDOCAINE 1 PATCH: 4 CREAM TOPICAL at 01:00

## 2020-03-04 RX ADMIN — CHLORHEXIDINE GLUCONATE 1 APPLICATION(S): 213 SOLUTION TOPICAL at 08:22

## 2020-03-04 RX ADMIN — Medication 650 MILLIGRAM(S): at 06:15

## 2020-03-04 RX ADMIN — Medication 2.5 MILLIGRAM(S): at 05:36

## 2020-03-04 RX ADMIN — Medication 1000 UNIT(S): at 12:30

## 2020-03-04 RX ADMIN — Medication 650 MILLIGRAM(S): at 12:31

## 2020-03-04 RX ADMIN — ENOXAPARIN SODIUM 40 MILLIGRAM(S): 100 INJECTION SUBCUTANEOUS at 12:30

## 2020-03-04 NOTE — PROGRESS NOTE ADULT - SUBJECTIVE AND OBJECTIVE BOX
SUBJECTIVE: Patient seen and examined this morning. No acute events overnight. Pain well controlled. Tolerating diet. Denies N/V/D/F/C.     MEDICATIONS  (STANDING):  acetaminophen   Tablet .. 650 milliGRAM(s) Oral every 6 hours  chlorhexidine 2% Cloths 1 Application(s) Topical <User Schedule>  cholecalciferol 1000 Unit(s) Oral daily  donepezil 5 milliGRAM(s) Oral at bedtime  enoxaparin Injectable 40 milliGRAM(s) SubCutaneous daily  lidocaine   Patch 1 Patch Transdermal daily  mirabegron ER 25 milliGRAM(s) Oral daily  pancrelipase  (CREON 36,000 Lipase Units) 2 Capsule(s) Oral three times a day with meals  polyethylene glycol 3350 17 Gram(s) Oral daily  predniSONE   Tablet 2.5 milliGRAM(s) Oral daily  senna 2 Tablet(s) Oral at bedtime  sertraline 25 milliGRAM(s) Oral daily  tamsulosin 0.4 milliGRAM(s) Oral at bedtime    MEDICATIONS  (PRN):  oxyCODONE    IR 2.5 milliGRAM(s) Oral every 4 hours PRN Moderate Pain (4 - 6)  oxyCODONE    IR 5 milliGRAM(s) Oral every 4 hours PRN Severe Pain (7 - 10)      OBJECTIVE:  Vital Signs Last 24 Hrs  T(C): 36.5 (04 Mar 2020 05:32), Max: 37.2 (03 Mar 2020 09:32)  T(F): 97.7 (04 Mar 2020 05:32), Max: 98.9 (03 Mar 2020 09:32)  HR: 77 (04 Mar 2020 05:32) (74 - 81)  BP: 157/78 (04 Mar 2020 05:32) (120/73 - 157/78)  BP(mean): --  RR: 18 (04 Mar 2020 05:32) (18 - 18)  SpO2: 97% (04 Mar 2020 05:32) (94% - 97%)      General: NAD  Chest: non-labored breathing, no respiratory distress  CV: Pulse regular presently  Abdomen: Soft, non-tender, non-distended  Extremities: warm and well perfused    I&O's Detail    03 Mar 2020 07:01  -  04 Mar 2020 07:00  --------------------------------------------------------  IN:    Oral Fluid: 460 mL  Total IN: 460 mL    OUT:    Incontinent per Condom Catheter: 325 mL  Total OUT: 325 mL    Total NET: 135 mL      04 Mar 2020 07:01  -  04 Mar 2020 07:20  --------------------------------------------------------  IN:  Total IN: 0 mL    OUT:    Voided: 175 mL  Total OUT: 175 mL    Total NET: -175 mL

## 2020-03-04 NOTE — PROGRESS NOTE ADULT - ATTENDING COMMENTS
Patient seen and examined on AM rounds  Awake, alert, mildly confused  vitals stable  Pain seems to be with good control  Oxygenation ok   CXR this AM without significant ptx or gerhard    - Planning for subacute rehab placement when bed available
elderly patient with dementia now with fall and multiple rib fractures  using ibuprofen and Tylenol for pain control    fall is the result of syncope.  had telemetry monitoring overnight with no apparent concerning arrythmia  will get further workup as noted by medical co - management  OK for transfer to the floor
seen and examined 03-02-20 @ 1437    no dyspnea  BM yesterday    RRR w/o M/R/G  CTA bilat  mild left lateral chest wall tenderness without overlying ecchymosis  soft / NT / ND      left 3-7 anterolateral displaced rib fractures  -pain control    distal right clavicle fracture  -RUE NWB in sling    dispo  -awaiting [facility] placement
seen and examined 03-03-20 @ 1015    no dyspnea    mild left anterolateral chest wall tenderness without overlying ecchymosis  soft / NT / ND  no pedal edema  RUE in sling      left 3-7 anterolateral displaced rib fractures  -pain control    distal right clavicle fracture  -RUE NWB in sling    dispo  -awaiting [facility] placement
seen and examined 03-04-20 @ 1020    BM today    mild left anterolateral chest wall tenderness with evolving ecchymosis  mild right distal clavicle tenderness with evolving ecchymosis  soft / NT / ND  no pedal edema  RUE in sling      left 3-7 anterolateral displaced rib fractures  -pain control    distal right clavicle fracture  -RUE NWB in sling    dispo  -transfer to [facility]
Sharmila Loredo MD  Division of [facility] Medicine  Spectra: [phone]
Sharmila Loredo MD  Division of [facility] Medicine  Spectra: [phone]
accepted to [facility] Loredo MD  Division of [facility] Medicine  Spectra: [phone]

## 2020-03-04 NOTE — DISCHARGE NOTE NURSING/CASE MANAGEMENT/SOCIAL WORK - NSDCPNDISPN_GEN_ALL_CORE
PT ORTHO - HIP Treatment & Discharge  Plan of Care Note    POD #: 1    DIAGNOSIS: right THR, anterior approach    SUBJECTIVE: Subjective: Patient resting awake in recliner chair upon therapist arrival, wife present. Pt agreeable to PT session. (08/22/18 0956)    OBJECTIVE:  Precautions:  Precautions  Hip Precautions: Direct anterior precautions (08/22/18 0824)  Weight Bearing Status: Weight bearing as tolerated right lower extremity (08/22/18 0824)  Other Precautions: contact (08/22/18 0956)  Precautions Compliance:  good  Pain:  Comfort/Function (Pain) SCORE with Activity: 4 (08/22/18 0956)  Vitals:  Vital Signs: supine during exercises: 99% SpO2, HR: 80bpm; no adverse response noted with activities (08/22/18 0956)  Activity Tolerance:  no limits in patient's ability to participate in session  Exercise:  Exercise Comments: PT provided patient with supine/seated RLE HEP handout, instructing pt on: supine ankle pumps, quad sets, heel slides, hip abd, hooklying hip abd, SAQ and seated LAQs; pt able to complete each exercises x 10 reps with AROM; able to demonstrate back appropriately and declined need for further review (08/22/18 0956)    Functional Mobility (as of date/time noted)  Bed Mobility:   Supine to Sit: Independent (08/22/18 0956)  Sit to Supine: Independent (08/22/18 0956)  Bed Mobility Comments: bed flat, no rail assist; pt able to complete without cues/assist (08/22/18 0956)    Transfer:   Sit to Stand: Modified Independent (08/22/18 0956)  Stand to Sit: Modified Independent (08/22/18 0956)  Stand Pivot Transfers: Modified Independent (08/22/18 0956)  Assistive Device/: 2-wheeled walker;Cane (08/22/18 0956)  Transfer Comments 1: patient steady upon standing and with movement with either device; demonstrates safe body mechanics and no LOB noted (08/22/18 0956)    Ambulation:  Gait Assistance: Modified Independent (08/22/18 0956)  Assistive Device/: 2-wheeled walker;Cane (08/22/18  0956)  Ambulation Distance (Feet): 300 Feet (x 1 with 2WW and then x 1 with cane) (08/22/18 0956)  Gait Comments 1: patient presents with safe, steady, reciprocal, step through gait pattern with 2WW and then repeated trial with cane use (PT reinforcing appropriate UE to hold cane); patient reports felt as steady and safe with cane vs 2WW and no LOB noted during either ambulation trial; no gait deviations apparent and pt reporting pain remains tolerable (08/22/18 0956)    Stair Negotiation:  Stair Management Assistance: Modified Independent (08/22/18 0956)  Stairs Mobility Comments: PT instructed patient on proper AD management and LE sequencing with stair mobility; after verbal instruction, patient able to demonstrate back appropriately without correctional cuing going up/down 8 stairs with single rail and cane use and up/down 1 curb step and cane use; no safety concerns or LOB noted (08/22/18 0956)    See PT flowsheet for full details regarding the PT therapy provided.    ASSESSMENT:   Emphasis of today's session on progression of bed mobility, transfers, ambulation, and stair/curb mobility, as well as review of updated RLE supine/seated HEP. Patient demonstrates good progress with therapy this this date, allowing progression to modified independent level using 2WW or cane with functional activities. Patient also able to demonstrate back HEP appropriately and declined need for further review. Pain remains tolerable and patient able to verbalize appropriate pain management strategies at home. Patient has met all established acute PT goals and is now appropriate for d/c home. Patient denies any concerns regarding d/c. Spouse will be assisting patient upon d/c to ease transition home. PT signing off. RN notified.    Progress: Discontinue PT  PT Identified Barriers to Discharge: medical status     EDUCATION:   On this date, the patient was educated on HEP, progression on functional mobility, pain management strategies,  safety precautions, and d/c recommendations.    The response to education was: Verbalizes understanding and Demonstrates understanding    PLAN:   Continue skilled PT, including the following Treatment/Interventions: Functional transfer training;Strengthening;ROM;Patient/Family training;Equipment eval/education;Gait training;Bed mobility;Stairs retraining;Neuromuscular re-education;Safety Education (08/21/18 6175)   PT Frequency: Twice a day;7 days/week (08/21/18 1828)    Treatment Plan for Next Session: provide/review RLE HEP; progress bed mobility, transfers, and ambulation using 2WW (initiate with cane as appropriate); initiate stair mobility        RECOMMENDATIONS FOR DISCHARGE:  Recommendation for Discharge: PT: Home, OP therapy    PT/OT Mobility Equipment for Discharge: patient prefers cane use over 2WW (patient's wife brought patient's wooden cane, PT ensured appropriate fit and reinforced upright posture as cane not adjustable and very slightly shorter than ideal height; pt/wife verbalized understanding) (08/22/18 1199)        Side effects of pain management treatment/Safe use, storage and disposal of opioids when prescribed/Activities of daily living, including home environment that might     exacerbate pain or reduce effectiveness of the pain management plan of care as well as strategies to address these issues/Education provided on the pain management plan of care

## 2020-03-04 NOTE — PROGRESS NOTE ADULT - ASSESSMENT
A/P: 85M with a PMH of dementia, pancreatic insufficiency, and urinary incontinence with a recent history of a fall in October 2019 who is presenting today with a fall and left sided rib fractures from 3-7 and a R clavicle fracture    - Regular diet  - Incentive spirometer  - Cont home medications  - multimodal pain control  - PT    - Dispo: [facility] today    ACS

## 2020-03-04 NOTE — DISCHARGE NOTE NURSING/CASE MANAGEMENT/SOCIAL WORK - PATIENT PORTAL LINK FT
You can access the FollowMyHealth Patient Portal offered by [facility] by registering at the following website: [email or web address]. By joining [employer]’s FollowMyHealth portal, you will also be able to view your health information using other applications (apps) compatible with our system.

## 2020-03-04 NOTE — PROGRESS NOTE ADULT - SUBJECTIVE AND OBJECTIVE BOX
TRAUMA SURGERY [facility] MEDICINE PROGRESS NOTE    Patient is a 85y old  Male who presents with a chief complaint of fall with rib fractures (04 Mar 2020 07:19)    SUBJECTIVE / OVERNIGHT EVENTS:  overnight no events.  seen w/ family at bedside.  pt denies any complaints - no n/v/f/chills, cp, sob, abd pain.    CAPILLARY BLOOD GLUCOSE        I&O's Summary    03 Mar 2020 07:01  -  04 Mar 2020 07:00  --------------------------------------------------------  IN: 460 mL / OUT: 325 mL / NET: 135 mL    04 Mar 2020 07:01  -  04 Mar 2020 11:48  --------------------------------------------------------  IN: 0 mL / OUT: 175 mL / NET: -175 mL        Vital Signs Last 24 Hrs  T(C): 36.5 (04 Mar 2020 10:06), Max: 37 (03 Mar 2020 20:48)  T(F): 97.7 (04 Mar 2020 10:06), Max: 98.6 (03 Mar 2020 20:48)  HR: 69 (04 Mar 2020 10:06) (69 - 79)  BP: 131/80 (04 Mar 2020 10:06) (129/64 - 157/78)  BP(mean): --  RR: 18 (04 Mar 2020 10:06) (18 - 18)  SpO2: 97% (04 Mar 2020 10:06) (94% - 97%)    PHYSICAL EXAM:  GENERAL:  Well appearing, Citizen of [place] speaking, red hair, in NAD  HEAD:  NCAT  EYES: PERRLA, conjunctiva clear  NECK: Supple, No JVD  CHEST/LUNG: CTA B/L. No w/r/r.  HEART: Reg rate. Normal S1, S2. No m/r/g.   ABDOMEN: SNTND.   EXTREMITIES:  2+ Peripheral Pulses, No clubbing, cyanosis, edema.  r arm sling  PSYCH: appropriate affect    LABS:        MEDICATIONS  (STANDING):  acetaminophen   Tablet .. 650 milliGRAM(s) Oral every 6 hours  chlorhexidine 2% Cloths 1 Application(s) Topical <User Schedule>  cholecalciferol 1000 Unit(s) Oral daily  donepezil 5 milliGRAM(s) Oral at bedtime  enoxaparin Injectable 40 milliGRAM(s) SubCutaneous daily  lidocaine   Patch 1 Patch Transdermal daily  mirabegron ER 25 milliGRAM(s) Oral daily  pancrelipase  (CREON 36,000 Lipase Units) 2 Capsule(s) Oral three times a day with meals  polyethylene glycol 3350 17 Gram(s) Oral daily  predniSONE   Tablet 2.5 milliGRAM(s) Oral daily  senna 2 Tablet(s) Oral at bedtime  sertraline 25 milliGRAM(s) Oral daily  tamsulosin 0.4 milliGRAM(s) Oral at bedtime    MEDICATIONS  (PRN):  oxyCODONE    IR 2.5 milliGRAM(s) Oral every 4 hours PRN Moderate Pain (4 - 6)  oxyCODONE    IR 5 milliGRAM(s) Oral every 4 hours PRN Severe Pain (7 - 10)

## 2020-03-04 NOTE — PROGRESS NOTE ADULT - REASON FOR ADMISSION
fall with rib fractures

## 2020-03-04 NOTE — CHART NOTE - NSCHARTNOTEFT_GEN_A_CORE
Discussed with patient and family at bedside:    1. Has the patient been outside the country in the past 14 days? NO  2. In the past 14 days has the patient been in contact with anyone that traveled out of the country? NO  3. Has the patient had close contact with anyone positive for COVID-19? NO        Bronwyn Garza PA-C  [id number]

## 2020-03-06 PROBLEM — K86.89 OTHER SPECIFIED DISEASES OF PANCREAS: Chronic | Status: ACTIVE | Noted: 2020-02-28

## 2020-03-06 PROBLEM — F03.90 UNSPECIFIED DEMENTIA WITHOUT BEHAVIORAL DISTURBANCE: Chronic | Status: ACTIVE | Noted: 2020-02-28

## 2020-03-18 ENCOUNTER — APPOINTMENT (OUTPATIENT)
Dept: ORTHOPEDIC SURGERY | Facility: CLINIC | Age: 85
End: 2020-03-18

## 2020-04-01 RX ORDER — TAMSULOSIN HYDROCHLORIDE 0.4 MG/1
0.4 CAPSULE ORAL
Qty: 30 | Refills: 3 | Status: ACTIVE | COMMUNITY
Start: 2020-04-01

## 2020-04-03 RX ORDER — UNDERPADS 23"X24"
EACH MISCELLANEOUS
Qty: 60 | Refills: 6 | Status: ACTIVE | COMMUNITY
Start: 2020-04-03 | End: 1900-01-01

## 2020-04-06 ENCOUNTER — APPOINTMENT (OUTPATIENT)
Dept: FAMILY MEDICINE | Facility: CLINIC | Age: 85
End: 2020-04-06

## 2020-04-07 ENCOUNTER — APPOINTMENT (OUTPATIENT)
Dept: FAMILY MEDICINE | Facility: CLINIC | Age: 85
End: 2020-04-07

## 2020-04-07 RX ORDER — UNDERPADS 23" X 36"
EACH MISCELLANEOUS
Qty: 60 | Refills: 6 | Status: ACTIVE | COMMUNITY
Start: 2020-04-07 | End: 1900-01-01

## 2020-05-20 ENCOUNTER — RX RENEWAL (OUTPATIENT)
Age: 85
End: 2020-05-20

## 2020-08-26 ENCOUNTER — APPOINTMENT (OUTPATIENT)
Dept: FAMILY MEDICINE | Facility: CLINIC | Age: 85
End: 2020-08-26
Payer: MEDICARE

## 2020-08-26 VITALS
DIASTOLIC BLOOD PRESSURE: 76 MMHG | OXYGEN SATURATION: 98 % | TEMPERATURE: 97.9 F | HEART RATE: 67 BPM | SYSTOLIC BLOOD PRESSURE: 120 MMHG | RESPIRATION RATE: 12 BRPM | HEIGHT: 62 IN | BODY MASS INDEX: 23.55 KG/M2 | WEIGHT: 128 LBS

## 2020-08-26 LAB
BILIRUB UR QL STRIP: NORMAL
CLARITY UR: CLEAR
COLLECTION METHOD: NORMAL
GLUCOSE UR-MCNC: NORMAL
HCG UR QL: 0.2 EU/DL
HGB UR QL STRIP.AUTO: NORMAL
KETONES UR-MCNC: NORMAL
LEUKOCYTE ESTERASE UR QL STRIP: NORMAL
NITRITE UR QL STRIP: NORMAL
PH UR STRIP: 5.5
PROT UR STRIP-MCNC: NORMAL
SP GR UR STRIP: 1.03

## 2020-08-26 PROCEDURE — 99213 OFFICE O/P EST LOW 20 MIN: CPT

## 2020-08-28 NOTE — HISTORY OF PRESENT ILLNESS
[FreeTextEntry8] : Patient here today complaining of gross hematuria also some left hip pain.  Review of systems is unremarkable no fever no chills no dysuria patient does suffer from mild to moderate dementia

## 2020-08-28 NOTE — ASSESSMENT
[FreeTextEntry1] : Patient be sent for sonogram of the kidneys and also a agitation with urology with regard to the episode of gross hematuria urinalysis today however it does not reveal any microscopic blood

## 2020-08-28 NOTE — REVIEW OF SYSTEMS
[Incontinence] : incontinence [Nocturia] : nocturia [Night Sweats] : no night sweats [Claudication] : no  leg claudication [Paroxysmal Nocturnal Dyspnea] : no paroxysmal nocturnal dyspnea [Orthopena] : no orthopnea [Dyspnea on Exertion] : not dyspnea on exertion [Diarrhea] : no diarrhea [Melena] : no melena [Joint Pain] : no joint pain [Back Pain] : no back pain [Joint Stiffness] : no joint stiffness [de-identified] : Bruise left hip area [Fever] : no fever [Chills] : no chills [Vision Problems] : no vision problems [Fatigue] : no fatigue [Sore Throat] : no sore throat [Palpitations] : no palpitations [Chest Pain] : no chest pain [Shortness Of Breath] : no shortness of breath [Wheezing] : no wheezing [Cough] : no cough [Abdominal Pain] : no abdominal pain [Constipation] : no constipation [Nausea] : no nausea [Vomiting] : no vomiting [Heartburn] : no heartburn [Hesitancy] : no hesitancy [Dysuria] : no dysuria [Frequency] : no frequency [Hematuria] : hematuria [Dizziness] : no dizziness [Headache] : no headache [Fainting] : no fainting [Memory Loss] : no memory loss [Unsteady Walk] : ataxia

## 2020-08-28 NOTE — PHYSICAL EXAM
[Well-Appearing] : well-appearing [Supple] : supple [No Acute Distress] : no acute distress [Well Nourished] : well nourished [PERRL] : pupils equal round and reactive to light [Normal Sclera/Conjunctiva] : normal sclera/conjunctiva [Well Developed] : well developed [No JVD] : no jugular venous distention [No Lymphadenopathy] : no lymphadenopathy [No Respiratory Distress] : no respiratory distress  [Thyroid Normal, No Nodules] : the thyroid was normal and there were no nodules present [Regular Rhythm] : with a regular rhythm [Normal Rate] : normal rate  [No Accessory Muscle Use] : no accessory muscle use [No Edema] : there was no peripheral edema [Soft] : abdomen soft [No Murmur] : no murmur heard [Non-distended] : non-distended [Non Tender] : non-tender [Normal Anterior Cervical Nodes] : no anterior cervical lymphadenopathy [No HSM] : no HSM [No Spinal Tenderness] : no spinal tenderness [No CVA Tenderness] : no CVA  tenderness

## 2020-08-28 NOTE — REVIEW OF SYSTEMS
[Incontinence] : incontinence [Nocturia] : nocturia [Night Sweats] : no night sweats [Claudication] : no  leg claudication [Paroxysmal Nocturnal Dyspnea] : no paroxysmal nocturnal dyspnea [Orthopena] : no orthopnea [Dyspnea on Exertion] : not dyspnea on exertion [Diarrhea] : no diarrhea [Melena] : no melena [Joint Pain] : no joint pain [Back Pain] : no back pain [Joint Stiffness] : no joint stiffness [de-identified] : Bruise left hip area [Fever] : no fever [Chills] : no chills [Fatigue] : no fatigue [Vision Problems] : no vision problems [Sore Throat] : no sore throat [Chest Pain] : no chest pain [Palpitations] : no palpitations [Shortness Of Breath] : no shortness of breath [Wheezing] : no wheezing [Cough] : no cough [Abdominal Pain] : no abdominal pain [Nausea] : no nausea [Constipation] : no constipation [Vomiting] : no vomiting [Heartburn] : no heartburn [Dysuria] : no dysuria [Hesitancy] : no hesitancy [Hematuria] : hematuria [Frequency] : no frequency [Headache] : no headache [Fainting] : no fainting [Dizziness] : no dizziness [Unsteady Walk] : ataxia [Memory Loss] : no memory loss

## 2020-08-28 NOTE — PHYSICAL EXAM
[Well-Appearing] : well-appearing [Supple] : supple [No Acute Distress] : no acute distress [Well Nourished] : well nourished [Well Developed] : well developed [Normal Sclera/Conjunctiva] : normal sclera/conjunctiva [PERRL] : pupils equal round and reactive to light [No JVD] : no jugular venous distention [No Lymphadenopathy] : no lymphadenopathy [No Respiratory Distress] : no respiratory distress  [Thyroid Normal, No Nodules] : the thyroid was normal and there were no nodules present [No Accessory Muscle Use] : no accessory muscle use [Normal Rate] : normal rate  [Regular Rhythm] : with a regular rhythm [Soft] : abdomen soft [No Murmur] : no murmur heard [No Edema] : there was no peripheral edema [Non-distended] : non-distended [Non Tender] : non-tender [No HSM] : no HSM [Normal Anterior Cervical Nodes] : no anterior cervical lymphadenopathy [No CVA Tenderness] : no CVA  tenderness [No Spinal Tenderness] : no spinal tenderness

## 2020-09-02 ENCOUNTER — OUTPATIENT (OUTPATIENT)
Dept: OUTPATIENT SERVICES | Facility: HOSPITAL | Age: 85
LOS: 1 days | End: 2020-09-02
Payer: COMMERCIAL

## 2020-09-02 ENCOUNTER — APPOINTMENT (OUTPATIENT)
Dept: ULTRASOUND IMAGING | Facility: HOSPITAL | Age: 85
End: 2020-09-02
Payer: MEDICARE

## 2020-09-02 DIAGNOSIS — Z98.890 OTHER SPECIFIED POSTPROCEDURAL STATES: Chronic | ICD-10-CM

## 2020-09-02 DIAGNOSIS — R31.0 GROSS HEMATURIA: ICD-10-CM

## 2020-09-02 PROCEDURE — 76770 US EXAM ABDO BACK WALL COMP: CPT | Mod: 26

## 2020-09-02 PROCEDURE — 76775 US EXAM ABDO BACK WALL LIM: CPT | Mod: 26

## 2020-09-02 PROCEDURE — 76770 US EXAM ABDO BACK WALL COMP: CPT

## 2020-09-02 PROCEDURE — 76775 US EXAM ABDO BACK WALL LIM: CPT

## 2020-09-08 LAB
ALBUMIN SERPL ELPH-MCNC: 3.6 G/DL
ALP BLD-CCNC: 79 U/L
ALT SERPL-CCNC: 15 U/L
ANION GAP SERPL CALC-SCNC: 11 MMOL/L
AST SERPL-CCNC: 21 U/L
BASOPHILS # BLD AUTO: 0.03 K/UL
BASOPHILS NFR BLD AUTO: 0.4 %
BILIRUB SERPL-MCNC: 0.4 MG/DL
BUN SERPL-MCNC: 23 MG/DL
CALCIUM SERPL-MCNC: 8.8 MG/DL
CHLORIDE SERPL-SCNC: 107 MMOL/L
CHOLEST SERPL-MCNC: 189 MG/DL
CHOLEST/HDLC SERPL: 3.2 RATIO
CO2 SERPL-SCNC: 26 MMOL/L
CREAT SERPL-MCNC: 1.03 MG/DL
EOSINOPHIL # BLD AUTO: 0.13 K/UL
EOSINOPHIL NFR BLD AUTO: 1.8 %
ESTIMATED AVERAGE GLUCOSE: 111 MG/DL
GLUCOSE SERPL-MCNC: 93 MG/DL
HBA1C MFR BLD HPLC: 5.5 %
HCT VFR BLD CALC: 41.1 %
HDLC SERPL-MCNC: 59 MG/DL
HGB BLD-MCNC: 13.2 G/DL
IMM GRANULOCYTES NFR BLD AUTO: 0.3 %
LDLC SERPL CALC-MCNC: 115 MG/DL
LYMPHOCYTES # BLD AUTO: 2.62 K/UL
LYMPHOCYTES NFR BLD AUTO: 36.3 %
MAN DIFF?: NORMAL
MCHC RBC-ENTMCNC: 30.5 PG
MCHC RBC-ENTMCNC: 32.1 GM/DL
MCV RBC AUTO: 94.9 FL
MONOCYTES # BLD AUTO: 0.69 K/UL
MONOCYTES NFR BLD AUTO: 9.6 %
NEUTROPHILS # BLD AUTO: 3.73 K/UL
NEUTROPHILS NFR BLD AUTO: 51.6 %
PLATELET # BLD AUTO: 265 K/UL
POTASSIUM SERPL-SCNC: 4.3 MMOL/L
PROT SERPL-MCNC: 6.1 G/DL
PSA SERPL-MCNC: 0.49 NG/ML
RBC # BLD: 4.33 M/UL
RBC # FLD: 13.5 %
SODIUM SERPL-SCNC: 144 MMOL/L
TRIGL SERPL-MCNC: 76 MG/DL
WBC # FLD AUTO: 7.22 K/UL

## 2020-10-31 ENCOUNTER — RX RENEWAL (OUTPATIENT)
Age: 85
End: 2020-10-31

## 2020-12-21 NOTE — ED PROVIDER NOTE - RESPIRATORY [-], MLM
Medication(s) Requested: tramadol  Last office visit: 10/14  Last refill: 12/7  Is the patient due for refill of this medication(s): Yes  PDMP review: Criteria met. Forwarded to Physician/LEONARDO for signature.      no cough/no shortness of breath

## 2021-01-20 ENCOUNTER — EMERGENCY (EMERGENCY)
Facility: HOSPITAL | Age: 86
LOS: 1 days | Discharge: HOME | End: 2021-01-20
Attending: EMERGENCY MEDICINE | Admitting: EMERGENCY MEDICINE
Payer: COMMERCIAL

## 2021-01-20 ENCOUNTER — TRANSCRIPTION ENCOUNTER (OUTPATIENT)
Age: 86
End: 2021-01-20

## 2021-01-20 VITALS
RESPIRATION RATE: 17 BRPM | WEIGHT: 149.91 LBS | HEART RATE: 86 BPM | TEMPERATURE: 98 F | SYSTOLIC BLOOD PRESSURE: 170 MMHG | DIASTOLIC BLOOD PRESSURE: 80 MMHG | OXYGEN SATURATION: 98 % | HEIGHT: 62 IN

## 2021-01-20 VITALS
DIASTOLIC BLOOD PRESSURE: 76 MMHG | OXYGEN SATURATION: 98 % | RESPIRATION RATE: 16 BRPM | HEART RATE: 81 BPM | SYSTOLIC BLOOD PRESSURE: 154 MMHG

## 2021-01-20 DIAGNOSIS — Z98.890 OTHER SPECIFIED POSTPROCEDURAL STATES: Chronic | ICD-10-CM

## 2021-01-20 LAB
ALBUMIN SERPL ELPH-MCNC: 3 G/DL — LOW (ref 3.3–5)
ALP SERPL-CCNC: 77 U/L — SIGNIFICANT CHANGE UP (ref 40–120)
ALT FLD-CCNC: 28 U/L — SIGNIFICANT CHANGE UP (ref 10–45)
ANION GAP SERPL CALC-SCNC: 8 MMOL/L — SIGNIFICANT CHANGE UP (ref 5–17)
APTT BLD: 30 SEC — SIGNIFICANT CHANGE UP (ref 27.5–35.5)
AST SERPL-CCNC: 31 U/L — SIGNIFICANT CHANGE UP (ref 10–40)
BASOPHILS # BLD AUTO: 0.03 K/UL — SIGNIFICANT CHANGE UP (ref 0–0.2)
BASOPHILS NFR BLD AUTO: 0.3 % — SIGNIFICANT CHANGE UP (ref 0–2)
BILIRUB SERPL-MCNC: 0.4 MG/DL — SIGNIFICANT CHANGE UP (ref 0.2–1.2)
BUN SERPL-MCNC: 25 MG/DL — HIGH (ref 7–23)
CALCIUM SERPL-MCNC: 8.5 MG/DL — SIGNIFICANT CHANGE UP (ref 8.4–10.5)
CHLORIDE SERPL-SCNC: 107 MMOL/L — SIGNIFICANT CHANGE UP (ref 96–108)
CK SERPL-CCNC: 143 U/L — SIGNIFICANT CHANGE UP (ref 30–200)
CO2 SERPL-SCNC: 26 MMOL/L — SIGNIFICANT CHANGE UP (ref 22–31)
CREAT SERPL-MCNC: 0.96 MG/DL — SIGNIFICANT CHANGE UP (ref 0.5–1.3)
EOSINOPHIL # BLD AUTO: 0.06 K/UL — SIGNIFICANT CHANGE UP (ref 0–0.5)
EOSINOPHIL NFR BLD AUTO: 0.7 % — SIGNIFICANT CHANGE UP (ref 0–6)
GLUCOSE SERPL-MCNC: 104 MG/DL — HIGH (ref 70–99)
HCT VFR BLD CALC: 40.6 % — SIGNIFICANT CHANGE UP (ref 39–50)
HGB BLD-MCNC: 13.6 G/DL — SIGNIFICANT CHANGE UP (ref 13–17)
IMM GRANULOCYTES NFR BLD AUTO: 0.2 % — SIGNIFICANT CHANGE UP (ref 0–1.5)
INR BLD: 0.98 RATIO — SIGNIFICANT CHANGE UP (ref 0.88–1.16)
LYMPHOCYTES # BLD AUTO: 1.98 K/UL — SIGNIFICANT CHANGE UP (ref 1–3.3)
LYMPHOCYTES # BLD AUTO: 23 % — SIGNIFICANT CHANGE UP (ref 13–44)
MCHC RBC-ENTMCNC: 31.2 PG — SIGNIFICANT CHANGE UP (ref 27–34)
MCHC RBC-ENTMCNC: 33.5 GM/DL — SIGNIFICANT CHANGE UP (ref 32–36)
MCV RBC AUTO: 93.1 FL — SIGNIFICANT CHANGE UP (ref 80–100)
MONOCYTES # BLD AUTO: 0.61 K/UL — SIGNIFICANT CHANGE UP (ref 0–0.9)
MONOCYTES NFR BLD AUTO: 7.1 % — SIGNIFICANT CHANGE UP (ref 2–14)
NEUTROPHILS # BLD AUTO: 5.9 K/UL — SIGNIFICANT CHANGE UP (ref 1.8–7.4)
NEUTROPHILS NFR BLD AUTO: 68.7 % — SIGNIFICANT CHANGE UP (ref 43–77)
NRBC # BLD: 0 /100 WBCS — SIGNIFICANT CHANGE UP (ref 0–0)
PLATELET # BLD AUTO: 254 K/UL — SIGNIFICANT CHANGE UP (ref 150–400)
POTASSIUM SERPL-MCNC: 4.2 MMOL/L — SIGNIFICANT CHANGE UP (ref 3.5–5.3)
POTASSIUM SERPL-SCNC: 4.2 MMOL/L — SIGNIFICANT CHANGE UP (ref 3.5–5.3)
PROT SERPL-MCNC: 6.6 G/DL — SIGNIFICANT CHANGE UP (ref 6–8.3)
PROTHROM AB SERPL-ACNC: 11.9 SEC — SIGNIFICANT CHANGE UP (ref 10.6–13.6)
RBC # BLD: 4.36 M/UL — SIGNIFICANT CHANGE UP (ref 4.2–5.8)
RBC # FLD: 13 % — SIGNIFICANT CHANGE UP (ref 10.3–14.5)
SODIUM SERPL-SCNC: 141 MMOL/L — SIGNIFICANT CHANGE UP (ref 135–145)
TROPONIN I SERPL-MCNC: <.017 NG/ML — LOW (ref 0.02–0.06)
WBC # BLD: 8.6 K/UL — SIGNIFICANT CHANGE UP (ref 3.8–10.5)
WBC # FLD AUTO: 8.6 K/UL — SIGNIFICANT CHANGE UP (ref 3.8–10.5)

## 2021-01-20 PROCEDURE — 36415 COLL VENOUS BLD VENIPUNCTURE: CPT

## 2021-01-20 PROCEDURE — 72170 X-RAY EXAM OF PELVIS: CPT | Mod: 26

## 2021-01-20 PROCEDURE — 90471 IMMUNIZATION ADMIN: CPT

## 2021-01-20 PROCEDURE — 72170 X-RAY EXAM OF PELVIS: CPT

## 2021-01-20 PROCEDURE — 85025 COMPLETE CBC W/AUTO DIFF WBC: CPT

## 2021-01-20 PROCEDURE — 82550 ASSAY OF CK (CPK): CPT

## 2021-01-20 PROCEDURE — 85730 THROMBOPLASTIN TIME PARTIAL: CPT

## 2021-01-20 PROCEDURE — 96360 HYDRATION IV INFUSION INIT: CPT

## 2021-01-20 PROCEDURE — 71250 CT THORAX DX C-: CPT | Mod: 26,ME

## 2021-01-20 PROCEDURE — 84484 ASSAY OF TROPONIN QUANT: CPT

## 2021-01-20 PROCEDURE — 70450 CT HEAD/BRAIN W/O DYE: CPT

## 2021-01-20 PROCEDURE — 93005 ELECTROCARDIOGRAM TRACING: CPT

## 2021-01-20 PROCEDURE — 93010 ELECTROCARDIOGRAM REPORT: CPT

## 2021-01-20 PROCEDURE — 99285 EMERGENCY DEPT VISIT HI MDM: CPT

## 2021-01-20 PROCEDURE — 71250 CT THORAX DX C-: CPT

## 2021-01-20 PROCEDURE — G1004: CPT

## 2021-01-20 PROCEDURE — 85610 PROTHROMBIN TIME: CPT

## 2021-01-20 PROCEDURE — 80053 COMPREHEN METABOLIC PANEL: CPT

## 2021-01-20 PROCEDURE — 99284 EMERGENCY DEPT VISIT MOD MDM: CPT | Mod: 25

## 2021-01-20 PROCEDURE — 90715 TDAP VACCINE 7 YRS/> IM: CPT

## 2021-01-20 PROCEDURE — 70450 CT HEAD/BRAIN W/O DYE: CPT | Mod: 26,ME

## 2021-01-20 RX ORDER — SODIUM CHLORIDE 9 MG/ML
1000 INJECTION INTRAMUSCULAR; INTRAVENOUS; SUBCUTANEOUS ONCE
Refills: 0 | Status: COMPLETED | OUTPATIENT
Start: 2021-01-20 | End: 2021-01-20

## 2021-01-20 RX ORDER — TETANUS TOXOID, REDUCED DIPHTHERIA TOXOID AND ACELLULAR PERTUSSIS VACCINE, ADSORBED 5; 2.5; 8; 8; 2.5 [IU]/.5ML; [IU]/.5ML; UG/.5ML; UG/.5ML; UG/.5ML
0.5 SUSPENSION INTRAMUSCULAR ONCE
Refills: 0 | Status: COMPLETED | OUTPATIENT
Start: 2021-01-20 | End: 2021-01-20

## 2021-01-20 RX ORDER — ACETAMINOPHEN 500 MG
650 TABLET ORAL ONCE
Refills: 0 | Status: COMPLETED | OUTPATIENT
Start: 2021-01-20 | End: 2021-01-20

## 2021-01-20 RX ADMIN — SODIUM CHLORIDE 1000 MILLILITER(S): 9 INJECTION INTRAMUSCULAR; INTRAVENOUS; SUBCUTANEOUS at 14:53

## 2021-01-20 RX ADMIN — TETANUS TOXOID, REDUCED DIPHTHERIA TOXOID AND ACELLULAR PERTUSSIS VACCINE, ADSORBED 0.5 MILLILITER(S): 5; 2.5; 8; 8; 2.5 SUSPENSION INTRAMUSCULAR at 14:54

## 2021-01-20 RX ADMIN — SODIUM CHLORIDE 1000 MILLILITER(S): 9 INJECTION INTRAMUSCULAR; INTRAVENOUS; SUBCUTANEOUS at 15:53

## 2021-01-20 NOTE — ED PROVIDER NOTE - NSFOLLOWUPINSTRUCTIONS_ED_ALL_ED_FT
Trauma torácico    LO QUE NECESITA SABER:    Un trauma torácico es maninder lesión repentina y contundente en el pecho. [place] siempre es provocada por un accidente automovilístico o de motocicleta, por maninder lesión por estallido o maninder caída. También podría ser el resultado de maninder lesión de deportes, amanda un golpe de maninder rylee de béisbol. Es posible que sienta dolor al respirar hondo, toser y dormir. Beaver lesión puede tardar hasta 6 semanas en sanar por completo.    INSTRUCCIONES SOBRE EL EDUARDO HOSPITALARIA:    Busque atención médica de inmediato si:  •Usted tiene fiebre      •Le falta el aire      •Tose esputo amarillo, parveen o con elen.      •Usted tiene dolor nuevo o creciente.      Comuníquese con beaver médico si:  •Beaver dolor no mejora después de justin beaver medicamento para el dolor.      •Beaver dolor dura más de 8 semanas.      •Usted tiene preguntas o inquietudes acerca de beaver condición o cuidado.      Medicamentos:  •Puede administrarsepodrían administrarse. Pregunte cómo justin estos medicamentos de maninder forma frey. Los medicamentos recetados para el dolor podrían causar estreñimiento. Pregunte a beaver médico amanda prevenir o tratar estreñimiento.      •Los JUHI,amanda el ibuprofeno, ayudan a disminuir la inflamación, el dolor y la fiebre. Los JUHI pueden causar sangrado estomacal o problemas renales en ciertas personas. Si usted juan un medicamento anticoagulante, siempre pregúntele a beaver médico si los JUHI son seguros para usted. Siempre saman la etiqueta de maycol medicamento y siga las instrucciones.      •[place] kenneth medicamentos amanda se le haya indicado.Consulte con beaver médico si usted ana maría que beaver medicamento no le está ayudando o si presenta efectos secundarios. Infórmele si es alérgico a cualquier medicamento. Mantenga maninder lista actualizada de los medicamentos, las vitaminas y los productos herbales que juan. Incluya los siguientes datos de los medicamentos: cantidad, frecuencia y motivo de administración. Traiga con usted la lista o los envases de las píldoras a kenneth citas de seguimiento. Lleve la lista de los medicamentos con usted en lul de maninder emergencia.      Cuidado personal:  •Respire hondo y tosapara ayudar a prevenir la neumonía. Brunilda 10 inhalaciones profundas cada hora, incluso cuando se despierte margarita la noche. Sujete kenneth costillas con kenneth ana o con maninder almohada mientras juan las respiraciones profundas o tose. [place] ayudará a disminuir el dolor.      •Use beaver espirómetro de incentivopara hacer inhalaciones más profundas. Coloque la pieza plástica en la boca y tome un respiro muy profundo. Sostenga el aire lo avis que usted pueda. Luego, deje salir el aire. Brunilda esto 10 veces seguidas cada hora mientras esté despierto.      •Duerma en un sillón reclinable o en posición vertical,las primeras noches, para disminuir el dolor.      •Manténgase en movimientoen casa. No se quede en la cama. Camine distancias cortas. [place] ayudará a que kenneth pulmones funcionen correctamente y disminuya el riesgo de neumonía.      •No fume.La nicotina y otras sustancias químicas disminuyen la cantidad de oxígeno en beaver cuerpo. Pida información a beaver médico si usted actualmente fuma y necesita ayuda para dejar de fumar. Los cigarrillos electrónicos o el tabaco sin humo igualmente contienen nicotina. Consulte con beaver médico antes de utilizar estos productos.      Acuda a kenneth consultas de control con beaver médico según le indicaron.Anote kenneth preguntas para que se acuerde de hacerlas margarita kenneth visitas.

## 2021-01-20 NOTE — ED PROVIDER NOTE - ATTENDING CONTRIBUTION TO CARE
Dr. Mark: I performed a face to face bedside interview with patient regarding history of present illness, review of symptoms and past medical history. I completed an independent physical exam.  I have discussed patient's plan of care with PA.   I agree with note as stated above, having amended the EMR as needed to reflect my findings.   This includes HISTORY OF PRESENT ILLNESS, HIV, PAST MEDICAL/SURGICAL/FAMILY/SOCIAL HISTORY, ALLERGIES AND HOME MEDICATIONS, REVIEW OF SYSTEMS, PHYSICAL EXAM, and any PROGRESS NOTES during the time I functioned as the attending physician for this patient.    see mdm

## 2021-01-20 NOTE — ED PROVIDER NOTE - OBJECTIVE STATEMENT
86 yr old male with hx of dementia, sent from urgent care, s/p unwitnessed fall 4 days ago, presents c/o right chest tenderness and abrasion . pain with breathing. Unknown if hit head. poor historian.

## 2021-01-20 NOTE — ED ADULT NURSE NOTE - OBJECTIVE STATEMENT
Patient BIB family member to ED c/o right sided chest pain. Patient had a fall while going to the bathroom 4 days ago because the room was dark. Patient presents with skin tear to right chest wall. Hx: dementia

## 2021-01-20 NOTE — ED ADULT NURSE NOTE - PMH
Anxiety    Basal cell carcinoma  left shoulder  BPH (benign prostatic hyperplasia)    Dementia    Elevated blood sugar  secondary to steroids  Incontinence    Language barrier    Memory loss, short term    Nocturia  x1-2  Pancreatic insufficiency    Pancreatic mass  benign 2011  Pancreatitis  2011  Poor historian  both patient and wife

## 2021-01-20 NOTE — ED PROVIDER NOTE - PHYSICAL EXAMINATION
right sided chest- abrasion noted, ecchymosis with tenderness   lungs clear   spine- no bony tenderness,  positive ROM  pt ambulates with walker

## 2021-01-20 NOTE — ED PROVIDER NOTE - PATIENT PORTAL LINK FT
You can access the FollowMyHealth Patient Portal offered by [facility] by registering at the following website: [email or web address]. By joining [employer]’s FollowMyHealth portal, you will also be able to view your health information using other applications (apps) compatible with our system.

## 2021-01-20 NOTE — ED PROVIDER NOTE - CLINICAL SUMMARY MEDICAL DECISION MAKING FREE TEXT BOX
Dr. Mark: 86M h/o dementia sent from  for chest wall trauma and unwitnessed fall 4 days ago. Pt denies any other trauma. +ttp and abrasion and ecchymosis over right anterior chest wall. Pt A&Ox2, no spinal ttp, rrr, ctab, abdo soft/nt/nd, pelvis stable, normal ROM bilateral hips. Will pain ctrl, check labs, ct, reassess Dr. Mark: 86M h/o dementia sent from  for chest wall trauma and unwitnessed fall 4 days ago. Pt denies any other trauma. +ttp and abrasion and ecchymosis over right anterior chest wall. Pt A&Ox2, no spinal ttp, rrr, ctab, abdo soft/nt/nd, pelvis stable, normal ROM bilateral hips. Will pain ctrl, check labs, ct, reassess    Pt doing well, son to pick him up.

## 2021-01-31 ENCOUNTER — TRANSCRIPTION ENCOUNTER (OUTPATIENT)
Age: 86
End: 2021-01-31

## 2021-01-31 ENCOUNTER — INPATIENT (INPATIENT)
Facility: HOSPITAL | Age: 86
LOS: 8 days | Discharge: HOME | DRG: 480 | End: 2021-02-09
Attending: STUDENT IN AN ORGANIZED HEALTH CARE EDUCATION/TRAINING PROGRAM | Admitting: INTERNAL MEDICINE
Payer: COMMERCIAL

## 2021-01-31 VITALS
HEIGHT: 62 IN | RESPIRATION RATE: 18 BRPM | DIASTOLIC BLOOD PRESSURE: 90 MMHG | OXYGEN SATURATION: 98 % | HEART RATE: 87 BPM | SYSTOLIC BLOOD PRESSURE: 178 MMHG | TEMPERATURE: 98 F

## 2021-01-31 DIAGNOSIS — S72.002A FRACTURE OF UNSPECIFIED PART OF NECK OF LEFT FEMUR, INITIAL ENCOUNTER FOR CLOSED FRACTURE: ICD-10-CM

## 2021-01-31 DIAGNOSIS — Z98.890 OTHER SPECIFIED POSTPROCEDURAL STATES: Chronic | ICD-10-CM

## 2021-01-31 LAB
ALBUMIN SERPL ELPH-MCNC: 3.1 G/DL — LOW (ref 3.3–5)
ALP SERPL-CCNC: 90 U/L — SIGNIFICANT CHANGE UP (ref 40–120)
ALT FLD-CCNC: 30 U/L — SIGNIFICANT CHANGE UP (ref 10–45)
ANION GAP SERPL CALC-SCNC: 7 MMOL/L — SIGNIFICANT CHANGE UP (ref 5–17)
APTT BLD: 27.6 SEC — SIGNIFICANT CHANGE UP (ref 27.5–35.5)
AST SERPL-CCNC: 26 U/L — SIGNIFICANT CHANGE UP (ref 10–40)
BASOPHILS # BLD AUTO: 0.03 K/UL — SIGNIFICANT CHANGE UP (ref 0–0.2)
BASOPHILS NFR BLD AUTO: 0.3 % — SIGNIFICANT CHANGE UP (ref 0–2)
BILIRUB SERPL-MCNC: 0.2 MG/DL — SIGNIFICANT CHANGE UP (ref 0.2–1.2)
BUN SERPL-MCNC: 28 MG/DL — HIGH (ref 7–23)
CALCIUM SERPL-MCNC: 8.5 MG/DL — SIGNIFICANT CHANGE UP (ref 8.4–10.5)
CHLORIDE SERPL-SCNC: 107 MMOL/L — SIGNIFICANT CHANGE UP (ref 96–108)
CO2 SERPL-SCNC: 29 MMOL/L — SIGNIFICANT CHANGE UP (ref 22–31)
CREAT SERPL-MCNC: 1.26 MG/DL — SIGNIFICANT CHANGE UP (ref 0.5–1.3)
EOSINOPHIL # BLD AUTO: 0.04 K/UL — SIGNIFICANT CHANGE UP (ref 0–0.5)
EOSINOPHIL NFR BLD AUTO: 0.4 % — SIGNIFICANT CHANGE UP (ref 0–6)
GLUCOSE SERPL-MCNC: 145 MG/DL — HIGH (ref 70–99)
HCT VFR BLD CALC: 40.3 % — SIGNIFICANT CHANGE UP (ref 39–50)
HGB BLD-MCNC: 13.2 G/DL — SIGNIFICANT CHANGE UP (ref 13–17)
IMM GRANULOCYTES NFR BLD AUTO: 1 % — SIGNIFICANT CHANGE UP (ref 0–1.5)
INR BLD: 0.99 RATIO — SIGNIFICANT CHANGE UP (ref 0.88–1.16)
LYMPHOCYTES # BLD AUTO: 1.15 K/UL — SIGNIFICANT CHANGE UP (ref 1–3.3)
LYMPHOCYTES # BLD AUTO: 11.7 % — LOW (ref 13–44)
MCHC RBC-ENTMCNC: 31.2 PG — SIGNIFICANT CHANGE UP (ref 27–34)
MCHC RBC-ENTMCNC: 32.8 GM/DL — SIGNIFICANT CHANGE UP (ref 32–36)
MCV RBC AUTO: 95.3 FL — SIGNIFICANT CHANGE UP (ref 80–100)
MONOCYTES # BLD AUTO: 0.6 K/UL — SIGNIFICANT CHANGE UP (ref 0–0.9)
MONOCYTES NFR BLD AUTO: 6.1 % — SIGNIFICANT CHANGE UP (ref 2–14)
NEUTROPHILS # BLD AUTO: 7.94 K/UL — HIGH (ref 1.8–7.4)
NEUTROPHILS NFR BLD AUTO: 80.5 % — HIGH (ref 43–77)
NRBC # BLD: 0 /100 WBCS — SIGNIFICANT CHANGE UP (ref 0–0)
PLATELET # BLD AUTO: 279 K/UL — SIGNIFICANT CHANGE UP (ref 150–400)
POTASSIUM SERPL-MCNC: 4 MMOL/L — SIGNIFICANT CHANGE UP (ref 3.5–5.3)
POTASSIUM SERPL-SCNC: 4 MMOL/L — SIGNIFICANT CHANGE UP (ref 3.5–5.3)
PROT SERPL-MCNC: 6.8 G/DL — SIGNIFICANT CHANGE UP (ref 6–8.3)
PROTHROM AB SERPL-ACNC: 12 SEC — SIGNIFICANT CHANGE UP (ref 10.6–13.6)
RBC # BLD: 4.23 M/UL — SIGNIFICANT CHANGE UP (ref 4.2–5.8)
RBC # FLD: 12.9 % — SIGNIFICANT CHANGE UP (ref 10.3–14.5)
SODIUM SERPL-SCNC: 143 MMOL/L — SIGNIFICANT CHANGE UP (ref 135–145)
WBC # BLD: 9.86 K/UL — SIGNIFICANT CHANGE UP (ref 3.8–10.5)
WBC # FLD AUTO: 9.86 K/UL — SIGNIFICANT CHANGE UP (ref 3.8–10.5)

## 2021-01-31 PROCEDURE — 99223 1ST HOSP IP/OBS HIGH 75: CPT

## 2021-01-31 PROCEDURE — 70450 CT HEAD/BRAIN W/O DYE: CPT | Mod: 26,MA

## 2021-01-31 PROCEDURE — 71045 X-RAY EXAM CHEST 1 VIEW: CPT | Mod: 26

## 2021-01-31 PROCEDURE — 73502 X-RAY EXAM HIP UNI 2-3 VIEWS: CPT | Mod: 26,LT

## 2021-01-31 PROCEDURE — 99285 EMERGENCY DEPT VISIT HI MDM: CPT

## 2021-01-31 PROCEDURE — 73552 X-RAY EXAM OF FEMUR 2/>: CPT | Mod: 26,LT

## 2021-01-31 PROCEDURE — 93010 ELECTROCARDIOGRAM REPORT: CPT

## 2021-01-31 NOTE — ED ADULT NURSE NOTE - NSIMPLEMENTINTERV_GEN_ALL_ED
Implemented All Fall with Harm Risk Interventions:  Leonardo to call system. Call bell, personal items and telephone within reach. Instruct patient to call for assistance. Room bathroom lighting operational. Non-slip footwear when patient is off stretcher. Physically safe environment: no spills, clutter or unnecessary equipment. Stretcher in lowest position, wheels locked, appropriate side rails in place. Provide visual cue, wrist band, yellow gown, etc. Monitor gait and stability. Monitor for mental status changes and reorient to person, place, and time. Review medications for side effects contributing to fall risk. Reinforce activity limits and safety measures with patient and family. Provide visual clues: red socks.

## 2021-01-31 NOTE — ED ADULT TRIAGE NOTE - CHIEF COMPLAINT QUOTE
Patient presents to ED s/p mechanical fall at home approximately 1 hour ago. EMS suspects fractured left hip. IV access established en route, patient given 100mcg fentanyl. Patient denies use of anticoagulants. Denies head strike. Patient states he was in ED 2 weeks ago with pancreatic issues. Patient is A&Ox4.

## 2021-01-31 NOTE — ED PROVIDER NOTE - OBJECTIVE STATEMENT
pt bibems c/o L hip pain, moderate, sharp, since fall tonight.  wife states pt was in other room sleeping on sofa and she heard him fall. no LOC per wife. pt denies other pain. no recent illness

## 2021-01-31 NOTE — ED PROVIDER NOTE - CLINICAL SUMMARY MEDICAL DECISION MAKING FREE TEXT BOX
L hip pain s/p fall. no suggestion or report of syncope or preceding illness. check xray r/o hip fx. check ct h r/o trauma.     update: xray shows L hip fx. ortho consulted, d/w DR Durant, accepting admission

## 2021-01-31 NOTE — ED PROVIDER NOTE - MUSCULOSKELETAL, MLM
Spine appears normal, no c/t/L spine ttp. pelvis stable. L hip: unable to range, moderate ttp. L knee, lower leg nontender. 2+ dp. moves L toes/ankle.

## 2021-01-31 NOTE — ED ADULT NURSE NOTE - OBJECTIVE STATEMENT
Patient BIB EMS c/o 10/10 left hip pain after fall. Patient poor historian with history of dementia. Pt denies HA, dizziness, LOC, blood thinner use, recent illness, chest pain, SOB, or any other complaints aside of hip pain. Patient BIB EMS c/o 10/10 left hip pain after fall. Patient poor historian with history of dementia. Hospital free  services used because patient is Syriac speaking. Pt denies HA, dizziness, LOC, neck/back pain, blood thinner use, recent illness, chest pain, SOB, or any other complaints aside of hip pain. Patient reports sitting on couch, getting up, and "losing balance". Patient has had multiple falls in the last few months. EMS administered Fentanyl PTA.

## 2021-02-01 LAB
SARS-COV-2 IGG SERPL QL IA: NEGATIVE — SIGNIFICANT CHANGE UP
SARS-COV-2 IGM SERPL IA-ACNC: 0.07 INDEX — SIGNIFICANT CHANGE UP
SARS-COV-2 RNA SPEC QL NAA+PROBE: SIGNIFICANT CHANGE UP

## 2021-02-01 PROCEDURE — 27245 TREAT THIGH FRACTURE: CPT | Mod: LT

## 2021-02-01 PROCEDURE — 27245 TREAT THIGH FRACTURE: CPT | Mod: AS,LT

## 2021-02-01 RX ORDER — CHOLECALCIFEROL (VITAMIN D3) 125 MCG
1000 CAPSULE ORAL DAILY
Refills: 0 | Status: DISCONTINUED | OUTPATIENT
Start: 2021-02-01 | End: 2021-02-09

## 2021-02-01 RX ORDER — CEFAZOLIN SODIUM 1 G
2000 VIAL (EA) INJECTION EVERY 8 HOURS
Refills: 0 | Status: COMPLETED | OUTPATIENT
Start: 2021-02-01 | End: 2021-02-02

## 2021-02-01 RX ORDER — DONEPEZIL HYDROCHLORIDE 10 MG/1
5 TABLET, FILM COATED ORAL AT BEDTIME
Refills: 0 | Status: DISCONTINUED | OUTPATIENT
Start: 2021-02-01 | End: 2021-02-01

## 2021-02-01 RX ORDER — MIRABEGRON 50 MG/1
1 TABLET, EXTENDED RELEASE ORAL
Qty: 0 | Refills: 0 | DISCHARGE

## 2021-02-01 RX ORDER — ONDANSETRON 8 MG/1
4 TABLET, FILM COATED ORAL ONCE
Refills: 0 | Status: DISCONTINUED | OUTPATIENT
Start: 2021-02-01 | End: 2021-02-01

## 2021-02-01 RX ORDER — SODIUM CHLORIDE 9 MG/ML
1000 INJECTION, SOLUTION INTRAVENOUS
Refills: 0 | Status: DISCONTINUED | OUTPATIENT
Start: 2021-02-01 | End: 2021-02-09

## 2021-02-01 RX ORDER — CEFAZOLIN SODIUM 1 G
1000 VIAL (EA) INJECTION EVERY 8 HOURS
Refills: 0 | Status: DISCONTINUED | OUTPATIENT
Start: 2021-02-01 | End: 2021-02-01

## 2021-02-01 RX ORDER — TAMSULOSIN HYDROCHLORIDE 0.4 MG/1
0.4 CAPSULE ORAL AT BEDTIME
Refills: 0 | Status: DISCONTINUED | OUTPATIENT
Start: 2021-02-01 | End: 2021-02-09

## 2021-02-01 RX ORDER — ENOXAPARIN SODIUM 100 MG/ML
40 INJECTION SUBCUTANEOUS DAILY
Refills: 0 | Status: DISCONTINUED | OUTPATIENT
Start: 2021-02-02 | End: 2021-02-04

## 2021-02-01 RX ORDER — TAMSULOSIN HYDROCHLORIDE 0.4 MG/1
0.4 CAPSULE ORAL AT BEDTIME
Refills: 0 | Status: DISCONTINUED | OUTPATIENT
Start: 2021-02-01 | End: 2021-02-01

## 2021-02-01 RX ORDER — CHOLECALCIFEROL (VITAMIN D3) 125 MCG
1000 CAPSULE ORAL DAILY
Refills: 0 | Status: DISCONTINUED | OUTPATIENT
Start: 2021-02-01 | End: 2021-02-01

## 2021-02-01 RX ORDER — ACETAMINOPHEN 500 MG
650 TABLET ORAL EVERY 6 HOURS
Refills: 0 | Status: DISCONTINUED | OUTPATIENT
Start: 2021-02-01 | End: 2021-02-03

## 2021-02-01 RX ORDER — SENNA PLUS 8.6 MG/1
2 TABLET ORAL AT BEDTIME
Refills: 0 | Status: DISCONTINUED | OUTPATIENT
Start: 2021-02-01 | End: 2021-02-09

## 2021-02-01 RX ORDER — SENNA PLUS 8.6 MG/1
2 TABLET ORAL AT BEDTIME
Refills: 0 | Status: DISCONTINUED | OUTPATIENT
Start: 2021-02-01 | End: 2021-02-01

## 2021-02-01 RX ORDER — SERTRALINE 25 MG/1
12.5 TABLET, FILM COATED ORAL DAILY
Refills: 0 | Status: DISCONTINUED | OUTPATIENT
Start: 2021-02-01 | End: 2021-02-01

## 2021-02-01 RX ORDER — ACETAMINOPHEN 500 MG
650 TABLET ORAL EVERY 6 HOURS
Refills: 0 | Status: DISCONTINUED | OUTPATIENT
Start: 2021-02-01 | End: 2021-02-01

## 2021-02-01 RX ORDER — LIPASE/PROTEASE/AMYLASE 16-48-48K
2 CAPSULE,DELAYED RELEASE (ENTERIC COATED) ORAL
Refills: 0 | Status: DISCONTINUED | OUTPATIENT
Start: 2021-02-01 | End: 2021-02-01

## 2021-02-01 RX ORDER — CHLORHEXIDINE GLUCONATE 213 G/1000ML
1 SOLUTION TOPICAL ONCE
Refills: 0 | Status: COMPLETED | OUTPATIENT
Start: 2021-02-01 | End: 2021-02-01

## 2021-02-01 RX ORDER — LIPASE/PROTEASE/AMYLASE 16-48-48K
3 CAPSULE,DELAYED RELEASE (ENTERIC COATED) ORAL
Refills: 0 | Status: DISCONTINUED | OUTPATIENT
Start: 2021-02-01 | End: 2021-02-01

## 2021-02-01 RX ORDER — DONEPEZIL HYDROCHLORIDE 10 MG/1
5 TABLET, FILM COATED ORAL AT BEDTIME
Refills: 0 | Status: DISCONTINUED | OUTPATIENT
Start: 2021-02-01 | End: 2021-02-09

## 2021-02-01 RX ORDER — HYDROMORPHONE HYDROCHLORIDE 2 MG/ML
0.5 INJECTION INTRAMUSCULAR; INTRAVENOUS; SUBCUTANEOUS
Refills: 0 | Status: DISCONTINUED | OUTPATIENT
Start: 2021-02-01 | End: 2021-02-01

## 2021-02-01 RX ORDER — SODIUM CHLORIDE 9 MG/ML
1000 INJECTION, SOLUTION INTRAVENOUS
Refills: 0 | Status: DISCONTINUED | OUTPATIENT
Start: 2021-02-01 | End: 2021-02-01

## 2021-02-01 RX ORDER — SERTRALINE 25 MG/1
12.5 TABLET, FILM COATED ORAL DAILY
Refills: 0 | Status: DISCONTINUED | OUTPATIENT
Start: 2021-02-01 | End: 2021-02-09

## 2021-02-01 RX ORDER — MORPHINE SULFATE 50 MG/1
4 CAPSULE, EXTENDED RELEASE ORAL EVERY 8 HOURS
Refills: 0 | Status: DISCONTINUED | OUTPATIENT
Start: 2021-02-01 | End: 2021-02-05

## 2021-02-01 RX ORDER — HEPARIN SODIUM 5000 [USP'U]/ML
5000 INJECTION INTRAVENOUS; SUBCUTANEOUS EVERY 12 HOURS
Refills: 0 | Status: DISCONTINUED | OUTPATIENT
Start: 2021-02-01 | End: 2021-02-01

## 2021-02-01 RX ORDER — OXYCODONE HYDROCHLORIDE 5 MG/1
5 TABLET ORAL EVERY 8 HOURS
Refills: 0 | Status: DISCONTINUED | OUTPATIENT
Start: 2021-02-01 | End: 2021-02-08

## 2021-02-01 RX ORDER — LIPASE/PROTEASE/AMYLASE 16-48-48K
2 CAPSULE,DELAYED RELEASE (ENTERIC COATED) ORAL
Refills: 0 | Status: DISCONTINUED | OUTPATIENT
Start: 2021-02-01 | End: 2021-02-02

## 2021-02-01 RX ADMIN — SODIUM CHLORIDE 50 MILLILITER(S): 9 INJECTION, SOLUTION INTRAVENOUS at 15:25

## 2021-02-01 RX ADMIN — Medication 100 MILLIGRAM(S): at 21:48

## 2021-02-01 RX ADMIN — OXYCODONE HYDROCHLORIDE 5 MILLIGRAM(S): 5 TABLET ORAL at 23:34

## 2021-02-01 RX ADMIN — Medication 2.5 MILLIGRAM(S): at 17:49

## 2021-02-01 RX ADMIN — SODIUM CHLORIDE 50 MILLILITER(S): 9 INJECTION, SOLUTION INTRAVENOUS at 02:49

## 2021-02-01 RX ADMIN — CHLORHEXIDINE GLUCONATE 1 APPLICATION(S): 213 SOLUTION TOPICAL at 10:11

## 2021-02-01 RX ADMIN — Medication 2.5 MILLIGRAM(S): at 05:09

## 2021-02-01 RX ADMIN — TAMSULOSIN HYDROCHLORIDE 0.4 MILLIGRAM(S): 0.4 CAPSULE ORAL at 21:49

## 2021-02-01 RX ADMIN — HYDROMORPHONE HYDROCHLORIDE 0.5 MILLIGRAM(S): 2 INJECTION INTRAMUSCULAR; INTRAVENOUS; SUBCUTANEOUS at 13:26

## 2021-02-01 RX ADMIN — HEPARIN SODIUM 5000 UNIT(S): 5000 INJECTION INTRAVENOUS; SUBCUTANEOUS at 05:09

## 2021-02-01 RX ADMIN — Medication 2 CAPSULE(S): at 17:50

## 2021-02-01 RX ADMIN — DONEPEZIL HYDROCHLORIDE 5 MILLIGRAM(S): 10 TABLET, FILM COATED ORAL at 21:48

## 2021-02-01 NOTE — CHART NOTE - NSCHARTNOTEFT_GEN_A_CORE
Chart Note   TELE 0226 W1 ( TELE)      85 year old male with a PMH of dementia, pancreatic insufficiency, urinary incontinence, bph, frequent falls, BIBEMS because of left hip pain, unable to stand after tripping on carpet and falling on his left side.  He did not LOC, did not hit head.  He denies chest pain, dyspnea, cough, abd pain, fever.    xray with left femoral fracture.      Plan:     #Left femoral fracture  - Ortho consult pending- Dr Velazquez  - Analgesics prn  - DVT ppx    #Dementia  - Continue donepezil, sertraline     #BPH  - continue tamsulosin    #Pancreatic insufficiency   - Continue creon, prednisone  ( As per wife, pt has been on Prednisone since 2011 - was prescribed for ?Chronic pancreatitis)      #DVT ppx  - heparin       Pt's son is Jv, granddaughter is Sandy, [phone]. Wife is Kayla Sherman [phone] Chart Note  [facility] 0[address] ([facility])    85 year old male with a PMH of dementia, pancreatic insufficiency, urinary incontinence, bph, frequent falls, BIBEMS because of left hip pain, unable to stand after tripping on carpet and falling on his left side.  He did not LOC, did not hit head.  He denies chest pain, dyspnea, cough, abd pain, fever.    xray with left femoral fracture.      Patient transferred to Tele 226W. Spoke with patient via  phone (ID [id number]). Patient is A&Ox1, complains of pain to left hip and thigh.     Plan:     #Left femoral fracture  - Ortho consulted - Dr Velazquez  - plan for OR today   - Analgesics prn  - DVT ppx    #Dementia  - Continue donepezil, sertraline     #BPH  - continue tamsulosin    #Pancreatic insufficiency   - Continue creon, prednisone  ( As per wife, pt has been on Prednisone since 2011 - was prescribed for ?Chronic pancreatitis)      #DVT ppx  - heparin       Pt's son is Jv, granddaughter is Sandy, [phone]. Wife is Kalya Sherman [phone]

## 2021-02-01 NOTE — H&P ADULT - HISTORY OF PRESENT ILLNESS
85 year old male with a PMH of dementia, pancreatic insufficiency, and urinary incontinence with a recent history of a fall in October 2019 who is presenting today with a fall.  Originally it was thought he had CP while on the steps and syncopized and fell down three steps that were carpeted per the EMS, but it was later revealed by the wife that he fell and then had CP.  He had LOC for about 30 seconds and is currently still having some left sided chest pain that is decreased.  He was brought in by EMS.    His history of pancreatic insufficiency was obtained per the wife and he had a "mass" about 9 years ago that was drained through an endoscopy and was told it wasn't cancer.  He used to drink alcohol over three decades ago.  He is not a smoker.  Pt has been on Prednisone since 2011 for ? chronic pancreatitis,  85 year old male with a PMH of dementia, pancreatic insufficiency, urinary incontinence, bph, frequent falls, BIBEMS because of left hip pain, unable to stand after tripping on carpet and falling on his left side.  He did not LOC, did not hit head.  He denies chest pain, dyspnea, cough, abd pain, fever.    xray with left femoral fracture.

## 2021-02-01 NOTE — H&P ADULT - NSICDXPASTMEDICALHX_GEN_ALL_CORE_FT
PAST MEDICAL HISTORY:  Anxiety     Basal cell carcinoma left shoulder    BPH (benign prostatic hyperplasia)     Dementia     Elevated blood sugar secondary to steroids    Incontinence     Language barrier     Memory loss, short term     Nocturia x1-2    Pancreatic insufficiency     Pancreatic mass benign 2011    Pancreatitis 2011    Poor historian both patient and wife

## 2021-02-01 NOTE — H&P ADULT - NSHPPHYSICALEXAM_GEN_ALL_CORE
Vital Signs (24 Hrs):  T(C): 36.5 (01-31-21 @ 20:37), Max: 36.5 (01-31-21 @ 20:37)  HR: 87 (01-31-21 @ 20:37) (87 - 87)  BP: 178/90 (01-31-21 @ 20:37) (178/90 - 178/90)  RR: 18 (01-31-21 @ 20:37) (18 - 18)  SpO2: 98% (01-31-21 @ 20:37) (98% - 98%)  Daily Height in cm: 157.48 (31 Jan 2021 20:37)

## 2021-02-01 NOTE — H&P ADULT - NSHPLABSRESULTS_GEN_ALL_CORE
ECHO 03/20/20  Technically difficult study.  1. Mitral annular calcification, otherwise normal mitral  valve. Minimal mitral regurgitation.  2. Aortic valve not well visualized; appears to be a  calcified trileaflet valve with normal opening. Mild aortic  regurgitation.  3. Endocardium not well visualized; grossly hyperdynamic  left ventricular systolic function.  4. Mild diastolic dysfunction (Stage I).  5. The right ventricle is not well visualized; grossly  normal right ventricular systolic function.  *** No previous Echo exam. 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  1. Mitral annular calcification, otherwise normal mitral  valve. Minimal mitral regurgitation.  2. Aortic valve not well visualized; appears to be a  calcified trileaflet valve with normal opening. Mild aortic  regurgitation.  3. Endocardium not well visualized; grossly hyperdynamic  left ventricular systolic function.  4. Mild diastolic dysfunction (Stage I).  5. The right ventricle is not well visualized; grossly  normal right ventricular systolic function.  *** No previous Echo exam. 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  1. Mitral annular calcification, otherwise normal mitral  valve. Minimal mitral regurgitation.  2. Aortic valve not well visualized; appears to be a  calcified trileaflet valve with normal opening. Mild aortic  regurgitation.  3. Endocardium not well visualized; grossly hyperdynamic  left ventricular systolic function.  4. Mild diastolic dysfunction (Stage I).  5. The right ventricle is not well visualized; grossly  normal right ventricular systolic function.

## 2021-02-01 NOTE — H&P ADULT - ASSESSMENT
85 year old male with a PMH of dementia, pancreatic insufficiency, urinary incontinence, bph, frequent falls, BIBEMS because of left hip pain, unable to stand after tripping on carpet and falling on his left side.  He did not LOC, did not hit head.  He denies chest pain, dyspnea, cough, abd pain, fever.    xray with left femoral fracture.      Admit  Ortho Consult- Dr Velazquez  Analgesics prn  Cont Current meds: Creon, Donapezil, Tamsulosin, Sertraline, Prednisone  ( As per wife, pt has been on Prednisone since 2011 - was prescribed for ?Chronic pancreatitis)  DVT Prophylaxis  Pt is an elderly male, ASA Class II, medically stable and cleared for ortho surgery.  Will give stress dose steroids on call to OR since pt has been on long term steroid use.  D/w pt's son and his grand daughter, Sandy, who is willing to translate [phone].    IMPROVE VTE Individual Risk Assessment  RISK                                                                Points  [  ] Previous VTE                                                  3  [  ] Thrombophilia                                               2  [  ] Lower limb paralysis                                      2        (unable to hold up >15 seconds)    [  ] Current Cancer                                              2         (within 6 months)  [  x] Immobilization > 24 hrs                                1  [  ] ICU/CCU stay > 24 hours                              1  [x  ] Age > 60                                                      1  IMPROVE VTE Score ___2__  IMPROVE Score 0-1: Low Risk, No VTE prophylaxis required for most patients, encourage ambulation.   IMPROVE Score 2-3: At risk, pharmacologic VTE prophylaxis is indicated for most patients (in the absence of a contraindication)  IMPROVE Score > or = 4: High Risk, pharmacologic VTE prophylaxis is indicated for most patients (in the absence of a contraindication)

## 2021-02-01 NOTE — H&P ADULT - NSICDXPASTSURGICALHX_GEN_ALL_CORE_FT
PAST SURGICAL HISTORY:  S/P biopsy pancreatic mass 2011, benign    S/P colonoscopy 2014, no polyps

## 2021-02-02 LAB
ANION GAP SERPL CALC-SCNC: 10 MMOL/L — SIGNIFICANT CHANGE UP (ref 5–17)
BUN SERPL-MCNC: 31 MG/DL — HIGH (ref 7–23)
CALCIUM SERPL-MCNC: 7.8 MG/DL — LOW (ref 8.4–10.5)
CHLORIDE SERPL-SCNC: 107 MMOL/L — SIGNIFICANT CHANGE UP (ref 96–108)
CO2 SERPL-SCNC: 25 MMOL/L — SIGNIFICANT CHANGE UP (ref 22–31)
CREAT SERPL-MCNC: 1.23 MG/DL — SIGNIFICANT CHANGE UP (ref 0.5–1.3)
GLUCOSE SERPL-MCNC: 120 MG/DL — HIGH (ref 70–99)
HCT VFR BLD CALC: 26.2 % — LOW (ref 39–50)
HGB BLD-MCNC: 8.8 G/DL — LOW (ref 13–17)
MCHC RBC-ENTMCNC: 31.9 PG — SIGNIFICANT CHANGE UP (ref 27–34)
MCHC RBC-ENTMCNC: 33.6 GM/DL — SIGNIFICANT CHANGE UP (ref 32–36)
MCV RBC AUTO: 94.9 FL — SIGNIFICANT CHANGE UP (ref 80–100)
NRBC # BLD: 0 /100 WBCS — SIGNIFICANT CHANGE UP (ref 0–0)
PLATELET # BLD AUTO: 196 K/UL — SIGNIFICANT CHANGE UP (ref 150–400)
POTASSIUM SERPL-MCNC: 4 MMOL/L — SIGNIFICANT CHANGE UP (ref 3.5–5.3)
POTASSIUM SERPL-SCNC: 4 MMOL/L — SIGNIFICANT CHANGE UP (ref 3.5–5.3)
RBC # BLD: 2.76 M/UL — LOW (ref 4.2–5.8)
RBC # FLD: 13.1 % — SIGNIFICANT CHANGE UP (ref 10.3–14.5)
SODIUM SERPL-SCNC: 142 MMOL/L — SIGNIFICANT CHANGE UP (ref 135–145)
WBC # BLD: 8.51 K/UL — SIGNIFICANT CHANGE UP (ref 3.8–10.5)
WBC # FLD AUTO: 8.51 K/UL — SIGNIFICANT CHANGE UP (ref 3.8–10.5)

## 2021-02-02 PROCEDURE — 99233 SBSQ HOSP IP/OBS HIGH 50: CPT

## 2021-02-02 RX ORDER — LIPASE/PROTEASE/AMYLASE 16-48-48K
3 CAPSULE,DELAYED RELEASE (ENTERIC COATED) ORAL
Refills: 0 | Status: DISCONTINUED | OUTPATIENT
Start: 2021-02-02 | End: 2021-02-09

## 2021-02-02 RX ORDER — SODIUM CHLORIDE 9 MG/ML
500 INJECTION INTRAMUSCULAR; INTRAVENOUS; SUBCUTANEOUS
Refills: 0 | Status: DISCONTINUED | OUTPATIENT
Start: 2021-02-02 | End: 2021-02-03

## 2021-02-02 RX ORDER — LIPASE/PROTEASE/AMYLASE 16-48-48K
1 CAPSULE,DELAYED RELEASE (ENTERIC COATED) ORAL
Refills: 0 | Status: DISCONTINUED | OUTPATIENT
Start: 2021-02-02 | End: 2021-02-02

## 2021-02-02 RX ORDER — HALOPERIDOL DECANOATE 100 MG/ML
2 INJECTION INTRAMUSCULAR ONCE
Refills: 0 | Status: DISCONTINUED | OUTPATIENT
Start: 2021-02-02 | End: 2021-02-02

## 2021-02-02 RX ORDER — SODIUM CHLORIDE 9 MG/ML
1000 INJECTION INTRAMUSCULAR; INTRAVENOUS; SUBCUTANEOUS ONCE
Refills: 0 | Status: DISCONTINUED | OUTPATIENT
Start: 2021-02-02 | End: 2021-02-02

## 2021-02-02 RX ADMIN — SODIUM CHLORIDE 50 MILLILITER(S): 9 INJECTION, SOLUTION INTRAVENOUS at 20:15

## 2021-02-02 RX ADMIN — Medication 3 CAPSULE(S): at 18:35

## 2021-02-02 RX ADMIN — DONEPEZIL HYDROCHLORIDE 5 MILLIGRAM(S): 10 TABLET, FILM COATED ORAL at 20:14

## 2021-02-02 RX ADMIN — Medication 1 TABLET(S): at 12:04

## 2021-02-02 RX ADMIN — SERTRALINE 12.5 MILLIGRAM(S): 25 TABLET, FILM COATED ORAL at 12:04

## 2021-02-02 RX ADMIN — TAMSULOSIN HYDROCHLORIDE 0.4 MILLIGRAM(S): 0.4 CAPSULE ORAL at 20:14

## 2021-02-02 RX ADMIN — Medication 2.5 MILLIGRAM(S): at 05:09

## 2021-02-02 RX ADMIN — Medication 1000 UNIT(S): at 12:04

## 2021-02-02 RX ADMIN — ENOXAPARIN SODIUM 40 MILLIGRAM(S): 100 INJECTION SUBCUTANEOUS at 14:24

## 2021-02-02 RX ADMIN — Medication 3 CAPSULE(S): at 12:04

## 2021-02-02 RX ADMIN — SODIUM CHLORIDE 500 MILLILITER(S): 9 INJECTION INTRAMUSCULAR; INTRAVENOUS; SUBCUTANEOUS at 14:53

## 2021-02-02 RX ADMIN — Medication 100 MILLIGRAM(S): at 03:34

## 2021-02-02 RX ADMIN — Medication 650 MILLIGRAM(S): at 20:14

## 2021-02-02 RX ADMIN — OXYCODONE HYDROCHLORIDE 5 MILLIGRAM(S): 5 TABLET ORAL at 14:43

## 2021-02-02 NOTE — PROGRESS NOTE ADULT - SUBJECTIVE AND OBJECTIVE BOX
Patient is a 86y old  Male who presents with a chief complaint of left hip pain s/p fall (02 Feb 2021 07:53)  pt POD # 1 s/p left hip ORIF with gamma nail  pt was confused and combative last night    Patient seen and examined at bedside    ALLERGIES:  No Known Allergies    MEDICATIONS  (STANDING):  calcium carbonate 1250 mG  + Vitamin D (OsCal 500 + D) 1 Tablet(s) Oral daily  cholecalciferol 1000 Unit(s) Oral daily  donepezil 5 milliGRAM(s) Oral at bedtime  enoxaparin Injectable 40 milliGRAM(s) SubCutaneous daily  lactated ringers. 1000 milliLiter(s) (50 mL/Hr) IV Continuous <Continuous>  pancrelipase Oral Capsule (CREON 24,000 Lipase Units) - Peds 3 Capsule(s) Oral three times a day with meals  predniSONE   Tablet 2.5 milliGRAM(s) Oral daily  sertraline 12.5 milliGRAM(s) Oral daily  sodium chloride 0.9%. 500 milliLiter(s) (500 mL/Hr) IV Continuous <Continuous>  tamsulosin 0.4 milliGRAM(s) Oral at bedtime    MEDICATIONS  (PRN):  acetaminophen   Tablet .. 650 milliGRAM(s) Oral every 6 hours PRN Temp greater or equal to 38C (100.4F), Mild Pain (1 - 3)  morphine  - Injectable 4 milliGRAM(s) IV Push every 8 hours PRN Severe Pain (7 - 10)  oxyCODONE    IR 5 milliGRAM(s) Oral every 8 hours PRN Moderate Pain (4 - 6)  senna 2 Tablet(s) Oral at bedtime PRN Constipation    Vital Signs Last 24 Hrs  T(F): 99.3 (02 Feb 2021 10:10), Max: 99.3 (02 Feb 2021 10:10)  HR: 100 (02 Feb 2021 10:10) (73 - 109)  BP: 99/54 (02 Feb 2021 10:10) (99/54 - 116/50)  RR: 16 (02 Feb 2021 10:10) (16 - 16)  SpO2: 100% (02 Feb 2021 10:10) (100% - 100%)    I&O's Summary    01 Feb 2021 07:01  -  02 Feb 2021 07:00  --------------------------------------------------------  IN: 650 mL / OUT: 200 mL / NET: 450 mL    02 Feb 2021 07:01  -  02 Feb 2021 14:36  --------------------------------------------------------  IN: 320 mL / OUT: 0 mL / NET: 320 mL    PHYSICAL EXAM:  General: NAD, A/O x 1, confused  ENT: MMM  Neck: Supple, No JVD  Abdomen: Soft, Nontender, Nondistended  Extremities: RLE - wnl, LLE - thigh is swollen and ecchymotic, expected post op, incisions c/d/i, staples in place, knee wnl, calf soft, EHL/FHL 3/5, skin warm and dry    LABS:                        8.8    8.51  )-----------( 196      ( 02 Feb 2021 06:15 )             26.2     02-02    142  |  107  |  31  ----------------------------<  120  4.0   |  25  |  1.23    Ca    7.8      02 Feb 2021 06:15    TPro  6.8  /  Alb  3.1  /  TBili  0.2  /  DBili  x   /  AST  26  /  ALT  30  /  AlkPhos  90  01-31    eGFR if Non African American: 53 mL/min/1.73M2 (02-02-21 @ 06:15)  eGFR if African American: 61 mL/min/1.73M2 (02-02-21 @ 06:15)    PT/INR - ( 31 Jan 2021 21:15 )   PT: 12.0 sec;   INR: 0.99 ratio         PTT - ( 31 Jan 2021 21:15 )  PTT:27.6 sec

## 2021-02-02 NOTE — PROGRESS NOTE ADULT - ASSESSMENT
85 year old male with a PMH of dementia, pancreatic insufficiency, urinary incontinence, bph, frequent falls, BIBEMS because of left hip pain, unable to stand after tripping on carpet and falling on his left side.  He did not LOC, did not hit head.  He denies chest pain, dyspnea, cough, abd pain, fever.    xray with left femoral fracture.      #Subtrochanteric fracture of left femur  - s/p left hip ORIF 2/1, POD 1  - ortho following  - Analgesics prn  - DVT ppx    #Dementia  #Agitation   - Continue donepezil, sertraline   - haldol PRN    #BPH  - continue tamsulosin    #Pancreatic insufficiency   - Continue creon, prednisone  ( As per wife, pt has been on Prednisone since 2011 - was prescribed for ?Chronic pancreatitis)    #DVT ppx  - heparin       Pt's son is Jv, granddaughter is Sandy, [phone]. Wife is Kayla Sherman [phone].     85 year old male with a PMH of dementia, pancreatic insufficiency, urinary incontinence, bph, frequent falls, BIBEMS because of left hip pain, unable to stand after tripping on carpet and falling on his left side.  He did not LOC, did not hit head.  He denies chest pain, dyspnea, cough, abd pain, fever.    xray with left femoral fracture.      #Subtrochanteric fracture of left femur  - s/p left hip ORIF 2/1, POD 1  - ortho following  - Analgesics prn  - DVT ppx    #Acute blood loss anemia  - H&H dropped from 13.2 to 8.8 following surgery  - No active bleeding  - monitor H&H  - Transfuse for Hbg less than 7    #Dementia  #Agitation   - Continue donepezil, sertraline   - Mitten restraints x24 hrs     #BPH  - continue tamsulosin    #Pancreatic insufficiency   - Continue creon, prednisone    #DVT ppx  - heparin     Pt's son is Jv, granddaughter is Sandy, [phone]. Wife is Kayla Sherman [phone].     85 year old male with a PMH of dementia, pancreatic insufficiency, urinary incontinence, bph, frequent falls, BIBEMS because of left hip pain, unable to stand after tripping on carpet and falling on his left side.  He did not LOC, did not hit head.  He denies chest pain, dyspnea, cough, abd pain, fever.    xray with left femoral fracture.      #Subtrochanteric fracture of left femur  - s/p left hip ORIF 2/1, POD 1  - ortho following  - Analgesics prn  - DVT ppx    #Acute blood loss anemia  - H&H dropped from 13.2 to 8.8 following surgery  - No active bleeding  - monitor H&H  - Transfuse for Hbg less than 7    #Dementia  #Agitation   - Continue donepezil, sertraline   - Mitten restraints x24 hrs     #BPH  - continue tamsulosin    #Pancreatic insufficiency   - Continue creon, prednisone  ( As per wife, pt has been on Prednisone since 2011 - was prescribed for ?Chronic pancreatitis)    #DVT ppx  - heparin     2/2: Left message with callback # for granddaughter Sandy, [phone]. Wife is Kayla Sherman [phone].

## 2021-02-02 NOTE — PROGRESS NOTE ADULT - SUBJECTIVE AND OBJECTIVE BOX
Patient is a 86y old  Male who presents with a chief complaint of left hip pain s/p fall (02 Feb 2021 07:53)    Patient seen and examined at bedside. Overnight patient was agitated, pulling at IV tube tubing and dressings. Currently patient is calmer but still restless, pulling at IV     ALLERGIES:  No Known Allergies    MEDICATIONS  (STANDING):  calcium carbonate 1250 mG  + Vitamin D (OsCal 500 + D) 1 Tablet(s) Oral daily  cholecalciferol 1000 Unit(s) Oral daily  donepezil 5 milliGRAM(s) Oral at bedtime  enoxaparin Injectable 40 milliGRAM(s) SubCutaneous daily  lactated ringers. 1000 milliLiter(s) (50 mL/Hr) IV Continuous <Continuous>  pancrelipase  (CREON 36,000 Lipase Units) 2 Capsule(s) Oral three times a day with meals  predniSONE   Tablet 2.5 milliGRAM(s) Oral daily  sertraline 12.5 milliGRAM(s) Oral daily  tamsulosin 0.4 milliGRAM(s) Oral at bedtime    MEDICATIONS  (PRN):  acetaminophen   Tablet .. 650 milliGRAM(s) Oral every 6 hours PRN Temp greater or equal to 38C (100.4F), Mild Pain (1 - 3)  haloperidol    Injectable 2 milliGRAM(s) IV Push once PRN Agitation  morphine  - Injectable 4 milliGRAM(s) IV Push every 8 hours PRN Severe Pain (7 - 10)  oxyCODONE    IR 5 milliGRAM(s) Oral every 8 hours PRN Moderate Pain (4 - 6)  senna 2 Tablet(s) Oral at bedtime PRN Constipation    Vital Signs Last 24 Hrs  T(F): 97.6 (02 Feb 2021 05:11), Max: 98.8 (01 Feb 2021 15:00)  HR: 109 (02 Feb 2021 05:11) (65 - 109)  BP: 116/50 (02 Feb 2021 05:11) (112/52 - 175/57)  RR: 16 (02 Feb 2021 05:11) (16 - 18)  SpO2: 100% (01 Feb 2021 19:00) (96% - 100%)    I&O's Summary  01 Feb 2021 07:01  -  02 Feb 2021 07:00  --------------------------------------------------------  IN: 650 mL / OUT: 200 mL / NET: 450 mL    PHYSICAL EXAM:  General: Calm, A/O x 1  ENT: Dry mucus membranes   Neck: Supple, No JVD  Lungs: Clear to auscultation bilaterally  Cardio: tachycardic, + S1/S2, No murmurs  Abdomen: Soft, Nondistended, mildly ttp; Bowel sounds present  Extremities: No calf tenderness, No pitting edema. Left hip surgical staples clean, no redness or discharge     LABS:                        8.8    8.51  )-----------( 196      ( 02 Feb 2021 06:15 )             26.2     02-02    142  |  107  |  31  ----------------------------<  120  4.0   |  25  |  1.23    Ca    7.8      02 Feb 2021 06:15    TPro  6.8  /  Alb  3.1  /  TBili  0.2  /  DBili  x   /  AST  26  /  ALT  30  /  AlkPhos  90  01-31    eGFR if Non African American: 53 mL/min/1.73M2 (02-02-21 @ 06:15)  eGFR if African American: 61 mL/min/1.73M2 (02-02-21 @ 06:15)    PT/INR - ( 31 Jan 2021 21:15 )   PT: 12.0 sec;   INR: 0.99 ratio    PTT - ( 31 Jan 2021 21:15 )  PTT:27.6 sec    RADIOLOGY & ADDITIONAL TESTS:    Care Discussed with Consultants/Other Providers:

## 2021-02-02 NOTE — CHART NOTE - NSCHARTNOTEFT_GEN_A_CORE
Notified by RN that patient agitated/pulling at dressing and IV's  Attempted re-orientation however patient with Dementia not compliant  Ordered for Haldol x1 for agiation 84 yo M pmhx Dementia, pancreatic insufficency, frequent falls presented with left hip pain.  Xray demonstrated left femoral fracture. Underwent OR for left hip ORIF    Notified by RN that patient agitated/pulling at dressing and IV's.    ROS: Unable    PE  -Agitated/combative  -Left hip wound dressing off. Staples intact. Erythema sorrounding site    Comprehensive Metabolic Panel (01.31.21 @ 21:15)   Sodium, Serum: 143 mmol/L   Potassium, Serum: 4.0 mmol/L   Chloride, Serum: 107 mmol/L   Carbon Dioxide, Serum: 29 mmol/L   Anion Gap, Serum: 7 mmol/L   Blood Urea Nitrogen, Serum: 28 mg/dL   Creatinine, Serum: 1.26 mg/dL   Glucose, Serum: 145 mg/dL   Calcium, Total Serum: 8.5 mg/dL   Protein Total, Serum: 6.8 g/dL   Albumin, Serum: 3.1 g/dL   Bilirubin Total, Serum: 0.2 mg/dL   Alkaline Phosphatase, Serum: 90 U/L   Aspartate Aminotransferase (AST/SGOT): 26 U/L   Alanine Aminotransferase (ALT/SGPT): 30 U/L   eGFR if Non : 51: Interpretative comment   The units for eGFR are mL/min/1.73M2 (normalized body surface area). The   eGFR is calculated from a serum creatinine using the CKD-EPI equation.   Other variables required for calculation are race, age and sex. Among   patients with chronic kidney disease (CKD), the eGFR is useful in   determining the stage of disease according to KDOQI CKD classification.   All eGFR results are reported numerically with the following   interpretation.   GFR With Without   (ml/min/1.73 m2) Kidney Damage Kidney Damage   >= 90 Stage 1 Normal   60-89 Stage 2 Decreased GFR   30-59 Stage 3 Stage 3   15-29 Stage 4 Stage 4   < 15 Stage 5 Stage 5   Each stage of CKD assumes that the associated GFR level has been in   effect for at least 3 months. Determination of stages one and two (with   eGFR > 59 ml/min/m2) requires estimation of kidney damage for at least 3   months as defined by structural or functional abnormalities.   Limitations: All estimates of GFR will be less accurate for patients at   extremes of muscle mass (including but not limited to frail elderly,   critically ill, or cancer patients), those with unusual diets, and those   with conditions associated with reduced secretion or extrarenal   elimination of creatinine. The eGFR equation is not recommended for use   in patients with unstable creatinine levels. mL/min/1.73M2   eGFR if African American: 59 mL/min/1.73M2       Plan  POD #1 s/p left hip ORIF now with agitation    -Attempted re-orientation however patient pulling at IV's and wounds  -Known hx of Dementia  -Adequate pain control  -Ordered for Haldol x1 for agitation  -Clean and re-dress wounds and notify Ortho to evaluate in AM

## 2021-02-02 NOTE — PROGRESS NOTE ADULT - ASSESSMENT
Patient is a 86y old  Male who presents with a chief complaint of left hip pain s/p fall (02 Feb 2021 07:53)  pt POD # 1 s/p left hip ORIF with gamma nail  pt was confused and combative last night    pt doing well from orthopaedic standpoint    acute blood loss anemia s/p fx and sx    plan  - DVT prophylaxis  - WBAT LLE  - analgesia prn  - PT  - trend h/h

## 2021-02-03 LAB
ANION GAP SERPL CALC-SCNC: 5 MMOL/L — SIGNIFICANT CHANGE UP (ref 5–17)
BUN SERPL-MCNC: 23 MG/DL — SIGNIFICANT CHANGE UP (ref 7–23)
CALCIUM SERPL-MCNC: 7.8 MG/DL — LOW (ref 8.4–10.5)
CHLORIDE SERPL-SCNC: 110 MMOL/L — HIGH (ref 96–108)
CO2 SERPL-SCNC: 30 MMOL/L — SIGNIFICANT CHANGE UP (ref 22–31)
CREAT SERPL-MCNC: 1.04 MG/DL — SIGNIFICANT CHANGE UP (ref 0.5–1.3)
GLUCOSE SERPL-MCNC: 123 MG/DL — HIGH (ref 70–99)
HCT VFR BLD CALC: 24.7 % — LOW (ref 39–50)
HGB BLD-MCNC: 8 G/DL — LOW (ref 13–17)
MCHC RBC-ENTMCNC: 31.3 PG — SIGNIFICANT CHANGE UP (ref 27–34)
MCHC RBC-ENTMCNC: 32.4 GM/DL — SIGNIFICANT CHANGE UP (ref 32–36)
MCV RBC AUTO: 96.5 FL — SIGNIFICANT CHANGE UP (ref 80–100)
NRBC # BLD: 0 /100 WBCS — SIGNIFICANT CHANGE UP (ref 0–0)
PLATELET # BLD AUTO: 190 K/UL — SIGNIFICANT CHANGE UP (ref 150–400)
POTASSIUM SERPL-MCNC: 3.9 MMOL/L — SIGNIFICANT CHANGE UP (ref 3.5–5.3)
POTASSIUM SERPL-SCNC: 3.9 MMOL/L — SIGNIFICANT CHANGE UP (ref 3.5–5.3)
RBC # BLD: 2.56 M/UL — LOW (ref 4.2–5.8)
RBC # FLD: 13.2 % — SIGNIFICANT CHANGE UP (ref 10.3–14.5)
SODIUM SERPL-SCNC: 145 MMOL/L — SIGNIFICANT CHANGE UP (ref 135–145)
WBC # BLD: 9.51 K/UL — SIGNIFICANT CHANGE UP (ref 3.8–10.5)
WBC # FLD AUTO: 9.51 K/UL — SIGNIFICANT CHANGE UP (ref 3.8–10.5)

## 2021-02-03 PROCEDURE — 99232 SBSQ HOSP IP/OBS MODERATE 35: CPT

## 2021-02-03 RX ORDER — ACETAMINOPHEN 500 MG
975 TABLET ORAL EVERY 8 HOURS
Refills: 0 | Status: DISCONTINUED | OUTPATIENT
Start: 2021-02-03 | End: 2021-02-09

## 2021-02-03 RX ADMIN — Medication 1 TABLET(S): at 11:22

## 2021-02-03 RX ADMIN — SODIUM CHLORIDE 50 MILLILITER(S): 9 INJECTION, SOLUTION INTRAVENOUS at 17:37

## 2021-02-03 RX ADMIN — ENOXAPARIN SODIUM 40 MILLIGRAM(S): 100 INJECTION SUBCUTANEOUS at 11:22

## 2021-02-03 RX ADMIN — SERTRALINE 12.5 MILLIGRAM(S): 25 TABLET, FILM COATED ORAL at 11:24

## 2021-02-03 RX ADMIN — Medication 1000 UNIT(S): at 11:23

## 2021-02-03 RX ADMIN — DONEPEZIL HYDROCHLORIDE 5 MILLIGRAM(S): 10 TABLET, FILM COATED ORAL at 20:47

## 2021-02-03 RX ADMIN — TAMSULOSIN HYDROCHLORIDE 0.4 MILLIGRAM(S): 0.4 CAPSULE ORAL at 20:46

## 2021-02-03 RX ADMIN — Medication 3 CAPSULE(S): at 11:22

## 2021-02-03 RX ADMIN — Medication 2.5 MILLIGRAM(S): at 06:10

## 2021-02-03 RX ADMIN — Medication 3 CAPSULE(S): at 17:37

## 2021-02-03 RX ADMIN — Medication 975 MILLIGRAM(S): at 14:18

## 2021-02-03 RX ADMIN — Medication 650 MILLIGRAM(S): at 06:09

## 2021-02-03 RX ADMIN — SENNA PLUS 2 TABLET(S): 8.6 TABLET ORAL at 20:50

## 2021-02-03 RX ADMIN — Medication 975 MILLIGRAM(S): at 20:46

## 2021-02-03 RX ADMIN — Medication 3 CAPSULE(S): at 08:55

## 2021-02-03 NOTE — PROGRESS NOTE ADULT - ASSESSMENT
PLAN:  -serial LE assessment  -pain control, avoid narcotics as patient had issues with agitation, delirium.  -underlying dementia, maintain aricept  -DVT prophylaxis with lovenox  -currently with unsecured mittens in place, as he was attempting to removie his PIVs during day shift. maitnain for now, attempt to remove in AM. Encourage appropriate sleep/wake cycle.   -Am labs

## 2021-02-03 NOTE — PROGRESS NOTE ADULT - ASSESSMENT
85 year old male with a PMH of dementia, pancreatic insufficiency, urinary incontinence, bph, frequent falls, BIBEMS because of left hip pain, unable to stand after tripping on carpet and falling on his left side.  He did not LOC, did not hit head.  He denies chest pain, dyspnea, cough, abd pain, fever.    xray with left femoral fracture.      #Subtrochanteric fracture of left femur  - s/p left hip ORIF 2/1, POD 2  - ortho following  - Analgesics - will start standing tylenol for improved pain control, continue PRN oxycodone and morphine for severe pain  - DVT ppx    #Acute blood loss anemia  - H&H downtrending 13.2 --> 8.8 --> 8.0  - hemodynamically stable  - No signs of active bleeding  - Continue monitor H&H  - Transfuse for Hbg less than 7 or if symptomatic     #Dementia  #Acute delirium   - Continue donepezil, sertraline   - Will DC mitten restraints as patient calmer     #BPH  - continue tamsulosin    #Pancreatic insufficiency   - Continue creon, prednisone  ( As per wife, pt has been on Prednisone since 2011 - was prescribed for ?Chronic pancreatitis)    #DVT ppx  - heparin     2/2: Left message with callback # for granddaughter Sandy, [phone]. Wife is Kayla Sherman [phone].     85 year old male with a PMH of dementia, pancreatic insufficiency, urinary incontinence, bph, frequent falls, BIBEMS because of left hip pain, unable to stand after tripping on carpet and falling on his left side.  He did not LOC, did not hit head.  He denies chest pain, dyspnea, cough, abd pain, fever.    xray with left femoral fracture.      #Subtrochanteric fracture of left femur  - s/p left hip ORIF 2/1, POD 2  - ortho following  - Analgesics - will start standing tylenol for improved pain control, continue PRN oxycodone and morphine for severe pain  - DVT ppx    #Acute blood loss anemia  - H&H downtrending 13.2 --> 8.8 --> 8.0  - hemodynamically stable  - No signs of active bleeding  - Continue monitor H&H  - Transfuse for Hbg less than 7 or if symptomatic     #Dementia  #Acute delirium   - Continue donepezil, sertraline   - Will DC mitten restraints as patient calmer     #BPH  - continue tamsulosin    #Pancreatic insufficiency   - Continue creon, prednisone  ( As per wife, pt has been on Prednisone since 2011 - was prescribed for ?Chronic pancreatitis)    #DVT ppx  - heparin     2/2: Left message with callback # for granddaughter Sandy, [phone]. Wife is Kayla Sherman [phone].  2/3: Spoke with patient's wife Kayla via  ([place], ID: [id number]). Obtained consent for blood transfusion should patient need it. All questions answered      85 year old male with a PMH of dementia, pancreatic insufficiency, urinary incontinence, bph, frequent falls, BIBEMS because of left hip pain, unable to stand after tripping on carpet and falling on his left side.  He did not LOC, did not hit head.  He denies chest pain, dyspnea, cough, abd pain, fever.    xray with left femoral fracture.      #Subtrochanteric fracture of left femur  - s/p left hip ORIF 2/1, POD 2  - ortho following  - Analgesics - will start standing tylenol for improved pain control, continue PRN oxycodone and morphine for severe pain  - DVT ppx    #Acute blood loss anemia  - H&H downtrending 13.2 --> 8.8 --> 8.0  - hemodynamically stable  - No signs of active bleeding  - Continue monitor H&H  - Transfuse for Hbg less than 7 or if symptomatic     #Dementia  #Acute delirium   - Continue donepezil, sertraline   - Will DC mitten restraints as patient calmer     #BPH  - continue tamsulosin    #Pancreatic insufficiency   - Continue creon, prednisone  ( As per wife, pt has been on Prednisone since 2011 - was prescribed for ?Chronic pancreatitis)    #DVT ppx  - heparin     Dispo: PT eval today, anticipate eventual [facility]    2/2: Left message with callback # for granddaughter Sandy, [phone]. Wife is Kayla Sherman [phone].  2/3: Spoke with patient's wife Kayla via  ([place], ID: [id number]). Obtained consent for blood transfusion should patient need it. All questions answered      86 year old male with a PMH of dementia, pancreatic insufficiency, urinary incontinence, bph, frequent falls, BIBEMS because of left hip pain, unable to stand after tripping on carpet and falling on his left side.  He did not LOC, did not hit head.  He denies chest pain, dyspnea, cough, abd pain, fever.    xray with left femoral fracture.      #Subtrochanteric fracture of left femur  - s/p left hip ORIF 2/1, POD 2  - ortho following  - Analgesics - will start standing tylenol for improved pain control, continue PRN oxycodone and morphine for severe pain  - DVT ppx    #Acute blood loss anemia  - H&H downtrending 13.2 --> 8.8 --> 8.0  - hemodynamically stable  - No signs of active bleeding  - Continue monitor H&H  - Transfuse for Hbg less than 7 or if symptomatic     #Dementia  #Acute delirium   - Continue donepezil, sertraline   - Will DC mitten restraints as patient calmer     #BPH  - continue tamsulosin    #Pancreatic insufficiency   - Continue creon, prednisone  ( As per wife, pt has been on Prednisone since 2011 - was prescribed for ?Chronic pancreatitis)    #DVT ppx  - heparin     Dispo: PT eval today, anticipate eventual GENES[facility]    2/2: Left message with callback # for granddaughter Sandy, [phone]. Wife is Kayla Sherman [phone].  2/3: Spoke with patient's wife Kayla via  ([place], ID: [id number]). Obtained consent for blood transfusion should patient need it. All questions answered

## 2021-02-03 NOTE — PROGRESS NOTE ADULT - SUBJECTIVE AND OBJECTIVE BOX
Patient is a 86y old  Male who presents with a chief complaint of left hip pain s/p fall (02 Feb 2021 13:45)      BRIEF HOSPITAL COURSE:     86M POD #2 s/p left hip ORIF with gamma nail    Events last 24 hours:   -per ntoe review has had issues with agitation, however tonight seems calm.    PAST MEDICAL & SURGICAL HISTORY:  Dementia    Pancreatic insufficiency    Incontinence    Nocturia  x1-2    BPH (benign prostatic hyperplasia)    Elevated blood sugar  secondary to steroids    Anxiety    Memory loss, short term    Poor historian  both patient and wife    Language barrier    Pancreatic mass  benign 2011    Pancreatitis  2011    Basal cell carcinoma  left shoulder    S/P biopsy  pancreatic mass 2011, benign    S/P colonoscopy  2014, no polyps            Medications:    tamsulosin 0.4 milliGRAM(s) Oral at bedtime      acetaminophen   Tablet .. 650 milliGRAM(s) Oral every 6 hours PRN  donepezil 5 milliGRAM(s) Oral at bedtime  morphine  - Injectable 4 milliGRAM(s) IV Push every 8 hours PRN  oxyCODONE    IR 5 milliGRAM(s) Oral every 8 hours PRN  sertraline 12.5 milliGRAM(s) Oral daily      enoxaparin Injectable 40 milliGRAM(s) SubCutaneous daily    pancrelipase Oral Capsule (CREON 24,000 Lipase Units) - Peds 3 Capsule(s) Oral three times a day with meals  senna 2 Tablet(s) Oral at bedtime PRN      predniSONE   Tablet 2.5 milliGRAM(s) Oral daily    calcium carbonate 1250 mG  + Vitamin D (OsCal 500 + D) 1 Tablet(s) Oral daily  cholecalciferol 1000 Unit(s) Oral daily  lactated ringers. 1000 milliLiter(s) IV Continuous <Continuous>  sodium chloride 0.9%. 500 milliLiter(s) IV Continuous <Continuous>                ICU Vital Signs Last 24 Hrs  T(C): 37.8 (02 Feb 2021 20:11), Max: 37.8 (02 Feb 2021 20:11)  T(F): 100 (02 Feb 2021 20:11), Max: 100 (02 Feb 2021 20:11)  HR: 102 (02 Feb 2021 20:11) (100 - 109)  BP: 119/54 (02 Feb 2021 20:11) (99/54 - 139/61)  BP(mean): 70 (02 Feb 2021 05:11) (70 - 70)  RR: 17 (02 Feb 2021 20:11) (16 - 18)  SpO2: 96% (02 Feb 2021 20:11) (96% - 100%)          I&O's Detail    01 Feb 2021 07:01  -  02 Feb 2021 07:00  --------------------------------------------------------  IN:    Lactated Ringers: 650 mL  Total IN: 650 mL    OUT:    Voided (mL): 200 mL  Total OUT: 200 mL    Total NET: 450 mL      02 Feb 2021 07:01  -  03 Feb 2021 00:33  --------------------------------------------------------  IN:    Oral Fluid: 320 mL  Total IN: 320 mL    OUT:  Total OUT: 0 mL    Total NET: 320 mL            LABS:                        8.8    8.51  )-----------( 196      ( 02 Feb 2021 06:15 )             26.2     02-02    142  |  107  |  31<H>  ----------------------------<  120<H>  4.0   |  25  |  1.23    Ca    7.8<L>      02 Feb 2021 06:15            CAPILLARY BLOOD GLUCOSE            CULTURES:      Physical Examination:    General: No acute distress.      HEENT: Pupils equal, reactive to light.  Symmetric.    PULM: Clear to auscultation bilaterally, no significant sputum production    CVS: Regular rate and rhythm, no murmurs, rubs, or gallops    ABD: Soft, nondistended, nontender, normoactive bowel sounds, no masses    EXT: No edema, nontender    SKIN: Warm and well perfused, no rashes noted.

## 2021-02-03 NOTE — PROGRESS NOTE ADULT - SUBJECTIVE AND OBJECTIVE BOX
Patient is a 86y old  Male who presents with a chief complaint of left hip pain s/p fall (03 Feb 2021 00:33)    Patient seen and examined at bedside.    ALLERGIES:  No Known Allergies    MEDICATIONS  (STANDING):  acetaminophen   Tablet .. 975 milliGRAM(s) Oral every 8 hours  calcium carbonate 1250 mG  + Vitamin D (OsCal 500 + D) 1 Tablet(s) Oral daily  cholecalciferol 1000 Unit(s) Oral daily  donepezil 5 milliGRAM(s) Oral at bedtime  enoxaparin Injectable 40 milliGRAM(s) SubCutaneous daily  lactated ringers. 1000 milliLiter(s) (50 mL/Hr) IV Continuous <Continuous>  pancrelipase Oral Capsule (CREON 24,000 Lipase Units) - Peds 3 Capsule(s) Oral three times a day with meals  predniSONE   Tablet 2.5 milliGRAM(s) Oral daily  sertraline 12.5 milliGRAM(s) Oral daily  tamsulosin 0.4 milliGRAM(s) Oral at bedtime    MEDICATIONS  (PRN):  morphine  - Injectable 4 milliGRAM(s) IV Push every 8 hours PRN Severe Pain (7 - 10)  oxyCODONE    IR 5 milliGRAM(s) Oral every 8 hours PRN Moderate Pain (4 - 6)  senna 2 Tablet(s) Oral at bedtime PRN Constipation    Vital Signs Last 24 Hrs  T(F): 99.5 (03 Feb 2021 05:23), Max: 100 (02 Feb 2021 20:11)  HR: 94 (03 Feb 2021 05:23) (94 - 104)  BP: 108/62 (03 Feb 2021 05:23) (99/54 - 139/61)  RR: 16 (03 Feb 2021 05:23) (16 - 18)  SpO2: 97% (03 Feb 2021 05:23) (96% - 100%)  I&O's Summary    02 Feb 2021 07:01  -  03 Feb 2021 07:00  --------------------------------------------------------  IN: 320 mL / OUT: 0 mL / NET: 320 mL          PHYSICAL EXAM:  General: NAD, A/O x 3  ENT: MMM  Neck: Supple, No JVD  Lungs: Clear to auscultation bilaterally  Cardio: RRR, S1/S2, No murmurs  Abdomen: Soft, Nontender, Nondistended; Bowel sounds present  Extremities: No calf tenderness, No pitting edema    LABS:                        8.0    9.51  )-----------( 190      ( 03 Feb 2021 06:58 )             24.7     02-03    145  |  110  |  23  ----------------------------<  123  3.9   |  30  |  1.04    Ca    7.8      03 Feb 2021 06:58    TPro  6.8  /  Alb  3.1  /  TBili  0.2  /  DBili  x   /  AST  26  /  ALT  30  /  AlkPhos  90  01-31    eGFR if Non African American: 64 mL/min/1.73M2 (02-03-21 @ 06:58)  eGFR if : 75 mL/min/1.73M2 (02-03-21 @ 06:58)    PT/INR - ( 31 Jan 2021 21:15 )   PT: 12.0 sec;   INR: 0.99 ratio         PTT - ( 31 Jan 2021 21:15 )  PTT:27.6 sec                                RADIOLOGY & ADDITIONAL TESTS:    Care Discussed with Consultants/Other Providers:    Patient is a 86y old  Male who presents with a chief complaint of left hip pain s/p fall (03 Feb 2021 00:33)    Patient seen and examined at bedside. Patient is calm, no overnight events reported.     ALLERGIES:  No Known Allergies    MEDICATIONS  (STANDING):  acetaminophen   Tablet .. 975 milliGRAM(s) Oral every 8 hours  calcium carbonate 1250 mG  + Vitamin D (OsCal 500 + D) 1 Tablet(s) Oral daily  cholecalciferol 1000 Unit(s) Oral daily  donepezil 5 milliGRAM(s) Oral at bedtime  enoxaparin Injectable 40 milliGRAM(s) SubCutaneous daily  lactated ringers. 1000 milliLiter(s) (50 mL/Hr) IV Continuous <Continuous>  pancrelipase Oral Capsule (CREON 24,000 Lipase Units) - Peds 3 Capsule(s) Oral three times a day with meals  predniSONE   Tablet 2.5 milliGRAM(s) Oral daily  sertraline 12.5 milliGRAM(s) Oral daily  tamsulosin 0.4 milliGRAM(s) Oral at bedtime    MEDICATIONS  (PRN):  morphine  - Injectable 4 milliGRAM(s) IV Push every 8 hours PRN Severe Pain (7 - 10)  oxyCODONE    IR 5 milliGRAM(s) Oral every 8 hours PRN Moderate Pain (4 - 6)  senna 2 Tablet(s) Oral at bedtime PRN Constipation    Vital Signs Last 24 Hrs  T(F): 99.5 (03 Feb 2021 05:23), Max: 100 (02 Feb 2021 20:11)  HR: 94 (03 Feb 2021 05:23) (94 - 104)  BP: 108/62 (03 Feb 2021 05:23) (99/54 - 139/61)  RR: 16 (03 Feb 2021 05:23) (16 - 18)  SpO2: 97% (03 Feb 2021 05:23) (96% - 100%)    I&O's Summary  02 Feb 2021 07:01  -  03 Feb 2021 07:00  --------------------------------------------------------  IN: 320 mL / OUT: 0 mL / NET: 320 mL    PHYSICAL EXAM:  General: Calm, A/O x 1  ENT: Dry mucus membranes   Neck: Supple, No JVD  Lungs: Clear to auscultation bilaterally  Cardio: tachycardic, + S1/S2, No murmurs  Abdomen: Soft, Nondistended, nontender; Bowel sounds present  Extremities: No calf tenderness, No pitting edema. Left hip surgical staples clean, no redness or discharge     LABS:                        8.0    9.51  )-----------( 190      ( 03 Feb 2021 06:58 )             24.7     02-03    145  |  110  |  23  ----------------------------<  123  3.9   |  30  |  1.04    Ca    7.8      03 Feb 2021 06:58    TPro  6.8  /  Alb  3.1  /  TBili  0.2  /  DBili  x   /  AST  26  /  ALT  30  /  AlkPhos  90  01-31    eGFR if Non African American: 64 mL/min/1.73M2 (02-03-21 @ 06:58)  eGFR if : 75 mL/min/1.73M2 (02-03-21 @ 06:58)    PT/INR - ( 31 Jan 2021 21:15 )   PT: 12.0 sec;   INR: 0.99 ratio    PTT - ( 31 Jan 2021 21:15 )  PTT:27.6 sec      RADIOLOGY & ADDITIONAL TESTS:    Care Discussed with Consultants/Other Providers:

## 2021-02-03 NOTE — PROGRESS NOTE ADULT - ASSESSMENT
A: s/p left sub-troch femur fx/ORIF (gamma nail) POD#2      acute blood loss anemia       dementia      pancreactic insuff    P: Mobilize with PT as possible, WBAT LLE     Lovenox for DVT proph, Limit narcs as possible to avoid sedation/confusion     Check H/H in a.m.; transfuse as per Medicine/symptoms     Monitor LLE edema, consider sonogram      A: s/p left sub-troch femur fx/ORIF (gamma nail) POD#2      acute blood loss anemia with downward trend H/H       dementia (Aricept)      pancreactic insuff (Creon, steroid)    P: Mobilize with PT as possible, WBAT LLE     Lovenox for DVT proph, Limit narcs as possible to avoid sedation/confusion     Check H/H in a.m.; transfuse as per Medicine/symptoms     Monitor LLE edema, consider sonogram     Advance diet (dysphagia II, thin liq)     Disposition planning

## 2021-02-03 NOTE — PROGRESS NOTE ADULT - SUBJECTIVE AND OBJECTIVE BOX
STATUS POST:  left hip fracture/ORIF (gamma nail)    POST OPERATIVE DAY: #2     Vital Signs Last 24 Hrs  T(C): 37.5 (03 Feb 2021 05:23), Max: 37.8 (02 Feb 2021 20:11)  T(F): 99.5 (03 Feb 2021 05:23), Max: 100 (02 Feb 2021 20:11)  HR: 94 (03 Feb 2021 05:23) (94 - 104)  BP: 108/62 (03 Feb 2021 05:23) (108/62 - 139/61)  BP(mean): --  RR: 16 (03 Feb 2021 05:23) (16 - 18)  SpO2: 97% (03 Feb 2021 05:23) (96% - 98%)      SUBJECTIVE: Pt seen sleeping, received Oxycodone this a.m.; noted to be combative/agitated yesterday and last night; appears comfortable at present; Tmax 100; Hgb 8; has received no PRBC to date; tolerating full liquid diet.    General Appearance: lethargic but arousable to verbal commands in Lao; eager to eat breakfast with assistance; denies pain  HEENT: + pallor; somewhat Cushingoid facies  Chest: clear breath sounds bilat  CV: [id number] @ 90 presently; healing scabbed abrasions/skin tears on anterior chest  Abdomen: +BS, soft, non-tender, non-tense  Extremities: LLE w Aquacell dressing distally above knee, C&D; proximal staple line intact w dried blood; no palp/visible hematoma; 1-2+ tense edema left thigh to knee; non-tender; pulses palp DP bilat, warm feet; withdraws to noxious stimuli    I&O's Summary; reviewed    MEDICATIONS: noted  acetaminophen   Tablet .. 975 milliGRAM(s) Oral every 8 hours  calcium carbonate 1250 mG  + Vitamin D (OsCal 500 + D) 1 Tablet(s) Oral daily  cholecalciferol 1000 Unit(s) Oral daily  donepezil 5 milliGRAM(s) Oral at bedtime  enoxaparin Injectable 40 milliGRAM(s) SubCutaneous daily  lactated ringers. 1000 milliLiter(s) (50 mL/Hr) IV Continuous <Continuous>  pancrelipase Oral Capsule (CREON 24,000 Lipase Units) - Peds 3 Capsule(s) Oral three times a day with meals  predniSONE   Tablet 2.5 milliGRAM(s) Oral daily  sertraline 12.5 milliGRAM(s) Oral daily  tamsulosin 0.4 milliGRAM(s) Oral at bedtime    MEDICATIONS  (PRN):  morphine  - Injectable 4 milliGRAM(s) IV Push every 8 hours PRN Severe Pain (7 - 10)  oxyCODONE    IR 5 milliGRAM(s) Oral every 8 hours PRN Moderate Pain (4 - 6)  senna 2 Tablet(s) Oral at bedtime PRN Constipation      LABS:            downward trend H/H             8.0    9.51  )-----------( 190         ( 03 Feb 2021 06:58 )             24.7     02-03    145  |  110<H>  |  23  ----------------------------<  123<H>  3.9   |  30  |  1.04    Ca    7.8<L>      03 Feb 2021 06:58            RADIOLOGY & ADDITIONAL STUDIES:

## 2021-02-03 NOTE — PHYSICAL THERAPY INITIAL EVALUATION ADULT - ADDITIONAL COMMENTS
pt is a poor informant, information per chart. Pt resides in private home with spouse & grandson, 3 steps to enter, flight within. Dtr states that patient has cane but he usually is ambulatory without AD, mostly independent with mobility and ADL's.

## 2021-02-04 LAB
ANION GAP SERPL CALC-SCNC: 4 MMOL/L — LOW (ref 5–17)
BLD GP AB SCN SERPL QL: SIGNIFICANT CHANGE UP
BUN SERPL-MCNC: 25 MG/DL — HIGH (ref 7–23)
CALCIUM SERPL-MCNC: 7.3 MG/DL — LOW (ref 8.4–10.5)
CHLORIDE SERPL-SCNC: 110 MMOL/L — HIGH (ref 96–108)
CO2 SERPL-SCNC: 30 MMOL/L — SIGNIFICANT CHANGE UP (ref 22–31)
CREAT SERPL-MCNC: 0.92 MG/DL — SIGNIFICANT CHANGE UP (ref 0.5–1.3)
GLUCOSE SERPL-MCNC: 117 MG/DL — HIGH (ref 70–99)
HCT VFR BLD CALC: 17.9 % — CRITICAL LOW (ref 39–50)
HGB BLD-MCNC: 5.9 G/DL — CRITICAL LOW (ref 13–17)
MCHC RBC-ENTMCNC: 31.7 PG — SIGNIFICANT CHANGE UP (ref 27–34)
MCHC RBC-ENTMCNC: 33 GM/DL — SIGNIFICANT CHANGE UP (ref 32–36)
MCV RBC AUTO: 96.2 FL — SIGNIFICANT CHANGE UP (ref 80–100)
NRBC # BLD: 0 /100 WBCS — SIGNIFICANT CHANGE UP (ref 0–0)
PLATELET # BLD AUTO: 177 K/UL — SIGNIFICANT CHANGE UP (ref 150–400)
POTASSIUM SERPL-MCNC: 3.9 MMOL/L — SIGNIFICANT CHANGE UP (ref 3.5–5.3)
POTASSIUM SERPL-SCNC: 3.9 MMOL/L — SIGNIFICANT CHANGE UP (ref 3.5–5.3)
RBC # BLD: 1.86 M/UL — LOW (ref 4.2–5.8)
RBC # FLD: 13 % — SIGNIFICANT CHANGE UP (ref 10.3–14.5)
SODIUM SERPL-SCNC: 144 MMOL/L — SIGNIFICANT CHANGE UP (ref 135–145)
WBC # BLD: 7.96 K/UL — SIGNIFICANT CHANGE UP (ref 3.8–10.5)
WBC # FLD AUTO: 7.96 K/UL — SIGNIFICANT CHANGE UP (ref 3.8–10.5)

## 2021-02-04 PROCEDURE — 99233 SBSQ HOSP IP/OBS HIGH 50: CPT

## 2021-02-04 RX ORDER — PANTOPRAZOLE SODIUM 20 MG/1
40 TABLET, DELAYED RELEASE ORAL
Refills: 0 | Status: DISCONTINUED | OUTPATIENT
Start: 2021-02-04 | End: 2021-02-08

## 2021-02-04 RX ADMIN — Medication 1000 UNIT(S): at 11:49

## 2021-02-04 RX ADMIN — Medication 975 MILLIGRAM(S): at 14:23

## 2021-02-04 RX ADMIN — PANTOPRAZOLE SODIUM 40 MILLIGRAM(S): 20 TABLET, DELAYED RELEASE ORAL at 23:33

## 2021-02-04 RX ADMIN — Medication 1 TABLET(S): at 11:49

## 2021-02-04 RX ADMIN — DONEPEZIL HYDROCHLORIDE 5 MILLIGRAM(S): 10 TABLET, FILM COATED ORAL at 21:59

## 2021-02-04 RX ADMIN — Medication 975 MILLIGRAM(S): at 05:55

## 2021-02-04 RX ADMIN — Medication 2.5 MILLIGRAM(S): at 05:56

## 2021-02-04 RX ADMIN — SODIUM CHLORIDE 50 MILLILITER(S): 9 INJECTION, SOLUTION INTRAVENOUS at 17:29

## 2021-02-04 RX ADMIN — Medication 3 CAPSULE(S): at 11:49

## 2021-02-04 RX ADMIN — Medication 3 CAPSULE(S): at 08:37

## 2021-02-04 RX ADMIN — SERTRALINE 12.5 MILLIGRAM(S): 25 TABLET, FILM COATED ORAL at 11:49

## 2021-02-04 RX ADMIN — Medication 3 CAPSULE(S): at 17:28

## 2021-02-04 RX ADMIN — Medication 975 MILLIGRAM(S): at 21:59

## 2021-02-04 RX ADMIN — TAMSULOSIN HYDROCHLORIDE 0.4 MILLIGRAM(S): 0.4 CAPSULE ORAL at 21:59

## 2021-02-04 NOTE — PROGRESS NOTE ADULT - SUBJECTIVE AND OBJECTIVE BOX
Patient is a 86y old  Male who presents with a chief complaint of left hip pain s/p fall (04 Feb 2021 10:11)  pt POD # 3 s/p left gamma nail for subtroch fx  pt more calm, restraints off, no events noted over night    Patient seen and examined at bedside    ALLERGIES:  No Known Allergies    MEDICATIONS  (STANDING):  acetaminophen   Tablet .. 975 milliGRAM(s) Oral every 8 hours  calcium carbonate 1250 mG  + Vitamin D (OsCal 500 + D) 1 Tablet(s) Oral daily  cholecalciferol 1000 Unit(s) Oral daily  donepezil 5 milliGRAM(s) Oral at bedtime  lactated ringers. 1000 milliLiter(s) (50 mL/Hr) IV Continuous <Continuous>  pancrelipase Oral Capsule (CREON 24,000 Lipase Units) - Peds 3 Capsule(s) Oral three times a day with meals  predniSONE   Tablet 2.5 milliGRAM(s) Oral daily  sertraline 12.5 milliGRAM(s) Oral daily  tamsulosin 0.4 milliGRAM(s) Oral at bedtime    MEDICATIONS  (PRN):  morphine  - Injectable 4 milliGRAM(s) IV Push every 8 hours PRN Severe Pain (7 - 10)  oxyCODONE    IR 5 milliGRAM(s) Oral every 8 hours PRN Moderate Pain (4 - 6)  senna 2 Tablet(s) Oral at bedtime PRN Constipation    Vital Signs Last 24 Hrs  T(F): 97.5 (04 Feb 2021 08:07), Max: 98.8 (04 Feb 2021 05:50)  HR: 88 (04 Feb 2021 08:07) (88 - 104)  BP: 126/53 (04 Feb 2021 08:07) (114/45 - 141/67)  RR: 16 (04 Feb 2021 08:07) (15 - 18)  SpO2: 100% (04 Feb 2021 08:07) (93% - 100%)    I&O's Summary    03 Feb 2021 07:01  -  04 Feb 2021 07:00  --------------------------------------------------------  IN: 900 mL / OUT: 0 mL / NET: 900 mL    PHYSICAL EXAM:  General: NAD, A/O x 1  ENT: MMM  Neck: Supple, No JVD  Abdomen: Soft, Nontender, Nondistended  Extremities: RLE - wnl, LLE - thigh and left flank are swollen and ecchymotic, thigh slightly tense, knee wnl, calf soft, EHL/FHL 3/5, skin is warm and dry    LABS:                        5.9    7.96  )-----------( 177      ( 04 Feb 2021 07:40 )             17.9     02-04    144  |  110  |  25  ----------------------------<  117  3.9   |  30  |  0.92    Ca    7.3      04 Feb 2021 07:40      eGFR if Non African American: 75 mL/min/1.73M2 (02-04-21 @ 07:40)  eGFR if : 87 mL/min/1.73M2 (02-04-21 @ 07:40)

## 2021-02-04 NOTE — PROGRESS NOTE ADULT - SUBJECTIVE AND OBJECTIVE BOX
Patient is a 86y old  Male who presents with a chief complaint of left hip pain s/p fall (03 Feb 2021 10:12)    Patient seen and examined at bedside. No overnight events reported, restraints discontinued, patient calm appearing. H/H decreased on am labs.     ALLERGIES:  No Known Allergies    MEDICATIONS  (STANDING):  acetaminophen   Tablet .. 975 milliGRAM(s) Oral every 8 hours  calcium carbonate 1250 mG  + Vitamin D (OsCal 500 + D) 1 Tablet(s) Oral daily  cholecalciferol 1000 Unit(s) Oral daily  donepezil 5 milliGRAM(s) Oral at bedtime  lactated ringers. 1000 milliLiter(s) (50 mL/Hr) IV Continuous <Continuous>  pancrelipase Oral Capsule (CREON 24,000 Lipase Units) - Peds 3 Capsule(s) Oral three times a day with meals  predniSONE   Tablet 2.5 milliGRAM(s) Oral daily  sertraline 12.5 milliGRAM(s) Oral daily  tamsulosin 0.4 milliGRAM(s) Oral at bedtime    MEDICATIONS  (PRN):  morphine  - Injectable 4 milliGRAM(s) IV Push every 8 hours PRN Severe Pain (7 - 10)  oxyCODONE    IR 5 milliGRAM(s) Oral every 8 hours PRN Moderate Pain (4 - 6)  senna 2 Tablet(s) Oral at bedtime PRN Constipation    Vital Signs Last 24 Hrs  T(F): 97.5 (04 Feb 2021 08:07), Max: 98.8 (04 Feb 2021 05:50)  HR: 88 (04 Feb 2021 08:07) (88 - 104)  BP: 126/53 (04 Feb 2021 08:07) (114/45 - 141/67)  RR: 16 (04 Feb 2021 08:07) (15 - 18)  SpO2: 100% (04 Feb 2021 08:07) (93% - 100%)  I&O's Summary    03 Feb 2021 07:01  -  04 Feb 2021 07:00  --------------------------------------------------------  IN: 900 mL / OUT: 0 mL / NET: 900 mL      PHYSICAL EXAM:  Gen: alert and awake elderly male, appears comfortable, NAD  CVS: S1S2, RRR  Lungs: lungs clear to auscultation bilaterally  Abd: soft, nondistended, nontender  Ext: trace edema, Left Lower ext without warmth, erythema or edema   Neuro alert to self    LABS:                        5.9    7.96  )-----------( 177      ( 04 Feb 2021 07:40 )             17.9     02-04    144  |  110  |  25  ----------------------------<  117  3.9   |  30  |  0.92    Ca    7.3      04 Feb 2021 07:40      eGFR if Non African American: 75 mL/min/1.73M2 (02-04-21 @ 07:40)  eGFR if : 87 mL/min/1.73M2 (02-04-21 @ 07:40)      RADIOLOGY & ADDITIONAL TESTS:        Care Discussed with Consultants/Other Providers:

## 2021-02-04 NOTE — PROGRESS NOTE ADULT - ASSESSMENT
86 year old male with a PMH of dementia, pancreatic insufficiency, urinary incontinence, bph, frequent falls, BIBEMS because of left hip pain, unable to stand after tripping on carpet and falling on his left side.  He did not LOC, did not hit head.  He denies chest pain, dyspnea, cough, abd pain, fever.    xray with left femoral fracture.      #Subtrochanteric fracture of left femur  - s/p left hip ORIF 2/1, POD 3  - ortho following  - Analgesics - will start standing tylenol for improved pain control, continue PRN oxycodone and morphine for severe pain  - DVT ppx    #Acute blood loss anemia  - H&H downtrending 13.2 --> 8.8 --> 8.0 -> 5.9  - will order 2 units PRBC today, trend cbc  - type and screen   - hemodynamically stable  - No signs of active bleeding      #Dementia  #Acute delirium   - Continue donepezil, sertraline   - restraints off as patient calmer     #BPH  - continue tamsulosin    #Pancreatic insufficiency   - Continue creon, prednisone  ( As per wife, pt has been on Prednisone since 2011 - was prescribed for ?Chronic pancreatitis)    #DVT ppx  - hold Lovenox in setting of acute blood loss anemia     Dispo: PT recommending [facility], for discharge once medically stable     Will update family on active issues

## 2021-02-04 NOTE — PROGRESS NOTE ADULT - ASSESSMENT
Assessment/Plan:     Diagnoses and all orders for this visit:    Benign essential hypertension  Comments:  re-start CCB, emphasized importance of medication compliance due to risk of related cardiovascular disease  Orders:  -     Comprehensive metabolic panel; Future  -     CBC and differential  -     amLODIPine (NORVASC) 5 mg tablet; Take 1 tablet (5 mg total) by mouth daily    Encounter for lipid screening for cardiovascular disease  -     Lipid Panel with Direct LDL reflex; Future    Class 1 obesity with serious comorbidity and body mass index (BMI) of 33 0 to 33 9 in adult, unspecified obesity type  Comments:  exercise and weight loss counseling discussed          Subjective:      Patient ID: Callie Pierre is a 37 y o  female  HPI    Here for follow up, not taking BP medication for sometime, forgets to take most days of the week  Gained weight during holidays, had some more baked goods, sweets  Not checking BP at home, willing to resume CCB now  Not taking singulair, takes only in warmer season for allergy sx  No other concerns or complaints      Past Medical History:   Diagnosis Date    Anxiety     Asthma     Blood pressure elevated without history of HTN     Last Assessed:6/30/2012    Depression     GERD (gastroesophageal reflux disease)     History of herpes simplex infection     History of hypertension     History of palpitations     Hyperlipidemia     IBS (irritable bowel syndrome)     Microscopic hematuria     Last Assessed:6/10/2014    Obesity     Seasonal allergies     Superficial thrombophlebitis of leg     Vitamin D deficiency      Vitals:    01/23/19 1707 01/23/19 1725 01/23/19 1738   BP: 120/94 140/94 124/100   BP Location:  Right arm    Patient Position:  Sitting    Cuff Size:  Large    Pulse: 89     Resp: 18     Temp: 99 1 °F (37 3 °C)     TempSrc: Oral     SpO2: 96%     Weight: 101 kg (223 lb 9 6 oz)     Height: 5' 9" (1 753 m)       Body mass index is 33 02 Patient is a 86y old  Male who presents with a chief complaint of left hip pain s/p fall (04 Feb 2021 10:11)  pt POD # 3 s/p left gamma nail for subtroch fx  pt more calm, restraints off, no events noted over night    pt doing well from orthopaedic standpoint    acute blood loss anemia, not unusual s/p complex femoral fx and sx    plan  - hold Lovenox  - Transfuse PRBCs  - trend h/h  - analgesia prn  - suggest to hold off PT until Hg above 7    will d/w attng  kg/m²  Current Outpatient Prescriptions:     albuterol (PROVENTIL HFA,VENTOLIN HFA) 90 mcg/act inhaler, Inhale 2 puffs every 6 (six) hours as needed for wheezing , Disp: , Rfl:     Multiple Vitamin (MULTIVITAMIN) capsule, Take 1 capsule by mouth daily, Disp: , Rfl:     amLODIPine (NORVASC) 5 mg tablet, Take 1 tablet (5 mg total) by mouth daily, Disp: 90 tablet, Rfl: 0    loratadine (CLARITIN) 10 mg tablet, Take 10 mg by mouth daily, Disp: , Rfl:     montelukast (SINGULAIR) 10 mg tablet, Take 10 mg by mouth daily at bedtime  Pt not dure of dose, advised to bring list am of surgery, Disp: , Rfl:     valACYclovir (VALTREX) 1,000 mg tablet, Take 1 tablet (1,000 mg total) by mouth 2 (two) times a day as needed (cold sores) for up to 2 days, Disp: 20 tablet, Rfl: 1  Allergies   Allergen Reactions    Erythromycin      Other reaction(s): GI Upset         Review of Systems   Constitutional: Negative for fever  HENT: Negative for congestion  Eyes: Negative for visual disturbance  Respiratory: Negative for shortness of breath  Cardiovascular: Negative for chest pain  Gastrointestinal: Negative for abdominal pain  Genitourinary: Negative for dysuria  Musculoskeletal: Negative for arthralgias  Skin: Negative for rash  Allergic/Immunologic: Positive for environmental allergies  Neurological: Negative for headaches  Psychiatric/Behavioral: Negative for dysphoric mood  Objective:      /100   Pulse 89   Temp 99 1 °F (37 3 °C) (Oral)   Resp 18   Ht 5' 9" (1 753 m)   Wt 101 kg (223 lb 9 6 oz)   LMP 06/16/2016   SpO2 96%   BMI 33 02 kg/m²          Physical Exam   Constitutional: She appears well-developed and well-nourished  +obese   HENT:   Head: Normocephalic and atraumatic  Mouth/Throat: Oropharynx is clear and moist    Cardiovascular: Normal rate, regular rhythm and normal heart sounds  No murmur heard    Pulmonary/Chest: Effort normal and breath sounds normal  She has no wheezes  She has no rales  Abdominal: Soft  Bowel sounds are normal  There is no tenderness  Musculoskeletal: She exhibits no edema  Neurological: She is alert  Psychiatric: She has a normal mood and affect  Her behavior is normal    Vitals reviewed

## 2021-02-05 LAB
ANION GAP SERPL CALC-SCNC: 5 MMOL/L — SIGNIFICANT CHANGE UP (ref 5–17)
ANISOCYTOSIS BLD QL: SLIGHT — SIGNIFICANT CHANGE UP
BASOPHILS # BLD AUTO: 0 K/UL — SIGNIFICANT CHANGE UP (ref 0–0.2)
BASOPHILS NFR BLD AUTO: 0 % — SIGNIFICANT CHANGE UP (ref 0–2)
BUN SERPL-MCNC: 47 MG/DL — HIGH (ref 7–23)
CALCIUM SERPL-MCNC: 7.3 MG/DL — LOW (ref 8.4–10.5)
CHLORIDE SERPL-SCNC: 112 MMOL/L — HIGH (ref 96–108)
CO2 SERPL-SCNC: 29 MMOL/L — SIGNIFICANT CHANGE UP (ref 22–31)
CREAT SERPL-MCNC: 1.24 MG/DL — SIGNIFICANT CHANGE UP (ref 0.5–1.3)
EOSINOPHIL # BLD AUTO: 0 K/UL — SIGNIFICANT CHANGE UP (ref 0–0.5)
EOSINOPHIL NFR BLD AUTO: 0 % — SIGNIFICANT CHANGE UP (ref 0–6)
GLUCOSE SERPL-MCNC: 153 MG/DL — HIGH (ref 70–99)
HCT VFR BLD CALC: 21.1 % — LOW (ref 39–50)
HCT VFR BLD CALC: 22.9 % — LOW (ref 39–50)
HCT VFR BLD CALC: 23 % — LOW (ref 39–50)
HGB BLD-MCNC: 7.1 G/DL — LOW (ref 13–17)
HGB BLD-MCNC: 7.6 G/DL — LOW (ref 13–17)
HGB BLD-MCNC: 7.8 G/DL — LOW (ref 13–17)
HYPOCHROMIA BLD QL: SLIGHT — SIGNIFICANT CHANGE UP
LYMPHOCYTES # BLD AUTO: 1.53 K/UL — SIGNIFICANT CHANGE UP (ref 1–3.3)
LYMPHOCYTES # BLD AUTO: 14 % — SIGNIFICANT CHANGE UP (ref 13–44)
MACROCYTES BLD QL: SLIGHT — SIGNIFICANT CHANGE UP
MAGNESIUM SERPL-MCNC: 2 MG/DL — SIGNIFICANT CHANGE UP (ref 1.6–2.6)
MANUAL SMEAR VERIFICATION: SIGNIFICANT CHANGE UP
MCHC RBC-ENTMCNC: 31 PG — SIGNIFICANT CHANGE UP (ref 27–34)
MCHC RBC-ENTMCNC: 31.4 PG — SIGNIFICANT CHANGE UP (ref 27–34)
MCHC RBC-ENTMCNC: 31.5 PG — SIGNIFICANT CHANGE UP (ref 27–34)
MCHC RBC-ENTMCNC: 33 GM/DL — SIGNIFICANT CHANGE UP (ref 32–36)
MCHC RBC-ENTMCNC: 33.6 GM/DL — SIGNIFICANT CHANGE UP (ref 32–36)
MCHC RBC-ENTMCNC: 34.1 GM/DL — SIGNIFICANT CHANGE UP (ref 32–36)
MCV RBC AUTO: 92.3 FL — SIGNIFICANT CHANGE UP (ref 80–100)
MCV RBC AUTO: 93.4 FL — SIGNIFICANT CHANGE UP (ref 80–100)
MCV RBC AUTO: 93.9 FL — SIGNIFICANT CHANGE UP (ref 80–100)
MONOCYTES # BLD AUTO: 0.88 K/UL — SIGNIFICANT CHANGE UP (ref 0–0.9)
MONOCYTES NFR BLD AUTO: 8 % — SIGNIFICANT CHANGE UP (ref 2–14)
NEUTROPHILS # BLD AUTO: 8.54 K/UL — HIGH (ref 1.8–7.4)
NEUTROPHILS NFR BLD AUTO: 78 % — HIGH (ref 43–77)
NRBC # BLD: 12 /100 — HIGH (ref 0–0)
NRBC # BLD: 5 /100 WBCS — HIGH (ref 0–0)
NRBC # BLD: 8 /100 WBCS — HIGH (ref 0–0)
OVALOCYTES BLD QL SMEAR: SLIGHT — SIGNIFICANT CHANGE UP
PLAT MORPH BLD: NORMAL — SIGNIFICANT CHANGE UP
PLATELET # BLD AUTO: 139 K/UL — LOW (ref 150–400)
PLATELET # BLD AUTO: 156 K/UL — SIGNIFICANT CHANGE UP (ref 150–400)
PLATELET # BLD AUTO: 189 K/UL — SIGNIFICANT CHANGE UP (ref 150–400)
POIKILOCYTOSIS BLD QL AUTO: SLIGHT — SIGNIFICANT CHANGE UP
POLYCHROMASIA BLD QL SMEAR: SLIGHT — SIGNIFICANT CHANGE UP
POTASSIUM SERPL-MCNC: 4.7 MMOL/L — SIGNIFICANT CHANGE UP (ref 3.5–5.3)
POTASSIUM SERPL-SCNC: 4.7 MMOL/L — SIGNIFICANT CHANGE UP (ref 3.5–5.3)
RBC # BLD: 2.26 M/UL — LOW (ref 4.2–5.8)
RBC # BLD: 2.45 M/UL — LOW (ref 4.2–5.8)
RBC # BLD: 2.48 M/UL — LOW (ref 4.2–5.8)
RBC # FLD: 13.8 % — SIGNIFICANT CHANGE UP (ref 10.3–14.5)
RBC # FLD: 14.6 % — HIGH (ref 10.3–14.5)
RBC # FLD: 14.7 % — HIGH (ref 10.3–14.5)
RBC BLD AUTO: ABNORMAL
SODIUM SERPL-SCNC: 146 MMOL/L — HIGH (ref 135–145)
WBC # BLD: 10.95 K/UL — HIGH (ref 3.8–10.5)
WBC # BLD: 12.34 K/UL — HIGH (ref 3.8–10.5)
WBC # BLD: 9.97 K/UL — SIGNIFICANT CHANGE UP (ref 3.8–10.5)
WBC # FLD AUTO: 10.95 K/UL — HIGH (ref 3.8–10.5)
WBC # FLD AUTO: 12.34 K/UL — HIGH (ref 3.8–10.5)
WBC # FLD AUTO: 9.97 K/UL — SIGNIFICANT CHANGE UP (ref 3.8–10.5)

## 2021-02-05 PROCEDURE — 99233 SBSQ HOSP IP/OBS HIGH 50: CPT

## 2021-02-05 RX ADMIN — SODIUM CHLORIDE 50 MILLILITER(S): 9 INJECTION, SOLUTION INTRAVENOUS at 16:21

## 2021-02-05 RX ADMIN — PANTOPRAZOLE SODIUM 40 MILLIGRAM(S): 20 TABLET, DELAYED RELEASE ORAL at 16:21

## 2021-02-05 RX ADMIN — Medication 975 MILLIGRAM(S): at 21:11

## 2021-02-05 RX ADMIN — Medication 1 TABLET(S): at 11:05

## 2021-02-05 RX ADMIN — MORPHINE SULFATE 4 MILLIGRAM(S): 50 CAPSULE, EXTENDED RELEASE ORAL at 16:22

## 2021-02-05 RX ADMIN — Medication 2.5 MILLIGRAM(S): at 04:58

## 2021-02-05 RX ADMIN — Medication 3 CAPSULE(S): at 12:28

## 2021-02-05 RX ADMIN — Medication 1000 UNIT(S): at 11:05

## 2021-02-05 RX ADMIN — PANTOPRAZOLE SODIUM 40 MILLIGRAM(S): 20 TABLET, DELAYED RELEASE ORAL at 04:58

## 2021-02-05 RX ADMIN — DONEPEZIL HYDROCHLORIDE 5 MILLIGRAM(S): 10 TABLET, FILM COATED ORAL at 21:12

## 2021-02-05 RX ADMIN — Medication 975 MILLIGRAM(S): at 12:28

## 2021-02-05 RX ADMIN — OXYCODONE HYDROCHLORIDE 5 MILLIGRAM(S): 5 TABLET ORAL at 11:04

## 2021-02-05 RX ADMIN — Medication 3 CAPSULE(S): at 16:21

## 2021-02-05 RX ADMIN — SERTRALINE 12.5 MILLIGRAM(S): 25 TABLET, FILM COATED ORAL at 11:05

## 2021-02-05 RX ADMIN — TAMSULOSIN HYDROCHLORIDE 0.4 MILLIGRAM(S): 0.4 CAPSULE ORAL at 21:12

## 2021-02-05 RX ADMIN — Medication 975 MILLIGRAM(S): at 04:57

## 2021-02-05 RX ADMIN — Medication 3 CAPSULE(S): at 08:58

## 2021-02-05 NOTE — PROGRESS NOTE ADULT - SUBJECTIVE AND OBJECTIVE BOX
Patient is a 86y old  Male who presents with a chief complaint of left hip pain s/p fall (04 Feb 2021 10:21)    HPI:    86 yo M pmhx Dementia, pancreatic insufficiency frequent falls presented with left hip pain.  Xay demonstrated left femoral fracture. Underwent OR for left hip ORIF. This AM patient noted to have Hgb 5.9 Transfused 2 PRBC. Per RN patient now with melena.    Allergies: No Known Allergies    PAST MEDICAL & SURGICAL HISTORY:  Dementia    Pancreatic insufficiency    Incontinence    Nocturia  x1-2    BPH (benign prostatic hyperplasia)    Elevated blood sugar  secondary to steroids    Anxiety    Memory loss, short term    Poor historian  both patient and wife    Language barrier    Pancreatic mass  benign 2011    Pancreatitis  2011    Basal cell carcinoma  left shoulder    S/P biopsy  pancreatic mass 2011, benign    S/P colonoscopy  2014, no polyps      FAMILY HISTORY:  Family history unknown      SOCIAL HISTORY:    Home Medications:    Review of Systems:  Unable to obtain secondary to Dementia    T(F): 97.6 (02-04-21 @ 20:07), Max: 98.8 (02-04-21 @ 05:50)  HR: 98 (02-04-21 @ 20:07) (88 - 98)  BP: 90/49 (02-04-21 @ 20:07) (90/49 - 135/62)  RR: 16 (02-04-21 @ 20:07) (16 - 18)  SpO2: 100% (02-04-21 @ 20:07)  Wt(kg): --    CAPILLARY BLOOD GLUCOSE        I&O's Summary    03 Feb 2021 07:01  -  04 Feb 2021 07:00  --------------------------------------------------------  IN: 900 mL / OUT: 0 mL / NET: 900 mL    04 Feb 2021 07:01  -  05 Feb 2021 00:47  --------------------------------------------------------  IN: 839 mL / OUT: 0 mL / NET: 839 mL        Physical Exam:     Gen: NAD  Neuro: awake, mcgowan snot follow commands  CVS: +S1S2  Resp: CTA   Abd: soft, NT, ND  Ext: warm, dry, left hip dressing C/D/I    Meds:    tamsulosin 0.4 milliGRAM(s) Oral at bedtime     predniSONE   Tablet 2.5 milliGRAM(s) Oral daily        acetaminophen   Tablet .. 975 milliGRAM(s) Oral every 8 hours  donepezil 5 milliGRAM(s) Oral at bedtime  morphine  - Injectable 4 milliGRAM(s) IV Push every 8 hours PRN  oxyCODONE    IR 5 milliGRAM(s) Oral every 8 hours PRN  sertraline 12.5 milliGRAM(s) Oral daily           pancrelipase Oral Capsule (CREON 24,000 Lipase Units) - Peds 3 Capsule(s) Oral three times a day with meals  pantoprazole  Injectable 40 milliGRAM(s) IV Push two times a day  senna 2 Tablet(s) Oral at bedtime PRN        calcium carbonate 1250 mG  + Vitamin D (OsCal 500 + D) 1 Tablet(s) Oral daily  cholecalciferol 1000 Unit(s) Oral daily  lactated ringers. 1000 milliLiter(s) IV Continuous <Continuous>                                    5.9    7.96  )-----------( 177      ( 04 Feb 2021 07:40 )             17.9       02-04    144  |  110<H>  |  25<H>  ----------------------------<  117<H>  3.9   |  30  |  0.92    Ca    7.3<L>      04 Feb 2021 07:40                        Radiology:     EXAM:  XR CHEST PORTABLE URGENT 1V      PROCEDURE DATE:  01/31/2021        INTERPRETATION:  Views:1  Comparison: 03/01/20  History: Injury    The lungs are clear of infiltrates and effusions. The cardiac and mediastinal contours appear unremarkable. There are old healed fractures of the left ribs and right clavicle.    Impression:  1. No evidence of acute pulmonary disease.                  JOAQUIN PARRA MD; Attending Radiologist  This document has been electronically signed. Feb 1 2021  9:57AM    Problems  -Left hip fx  -Melena  -ABLA    Assessment/Plan:  86 yo M pmhx Dementia, pancreatic insufficiency frequent falls presented with left hip pain s/p left hip ORIF. Patient now with ABLA and melena    -Transfused 2 PRBC; repeat Hgb 7.8. Monitor CBC Q6. Transfuse to keep Hgb >7  -Make NPO; Protonix BID. GI consult in AM  -Hold Lovenox. SCD for DVT ppx  -Pain control PRN

## 2021-02-05 NOTE — PROGRESS NOTE ADULT - SUBJECTIVE AND OBJECTIVE BOX
S/P ORIF Left hip displaced IT fracture   POD #4    Patient was seen and examined by me this am.   Patient is 4 days s/p ORIF left hip.   It was noted overnight that patient had episode of   melena.   Patient s hgb this am 7.1 s/p 1 unit PRBCs.   Lovenox discontinued.   Cbc to be repeated to trend hgb .   Hx of dementia, patient unable to answer questions appropriately .    Vital Signs Last 24 Hrs  T(C): 36.3 (05 Feb 2021 08:03), Max: 36.9 (04 Feb 2021 12:46)  T(F): 97.4 (05 Feb 2021 08:03), Max: 98.5 (04 Feb 2021 12:46)  HR: 94 (05 Feb 2021 10:59) (80 - 98)  BP: 139/59 (05 Feb 2021 10:59) (90/49 - 139/59)  BP(mean): 86 (05 Feb 2021 10:59) (72 - 86)  RR: 16 (05 Feb 2021 10:59) (16 - 17)  SpO2: 95% (05 Feb 2021 10:59) (95% - 100%)    PAST MEDICAL & SURGICAL HISTORY:  Dementia  Pancreatic insufficienccy  x1-2 nocturia   BPH (benign prostatic hyperplasia)  Elevated blood sugar  secondary to steroids  Anxiety  Memory loss, short term  Poor historian  both patient and wife  Language barrier  Pancreatic mass  benign 2011  Pancreatitis  2011  Basal cell carcinoma  left shoulder  S/P biopsy  pancreatic mass 2011, benign  S/P colonoscopy  2014, no polyps    MEDICATIONS  (STANDING):  acetaminophen   Tablet .. 975 milliGRAM(s) Oral every 8 hours  calcium carbonate 1250 mG  + Vitamin D (OsCal 500 + D) 1 Tablet(s) Oral daily  cholecalciferol 1000 Unit(s) Oral daily  donepezil 5 milliGRAM(s) Oral at bedtime  lactated ringers. 1000 milliLiter(s) (50 mL/Hr) IV Continuous <Continuous>  pancrelipase Oral Capsule (CREON 24,000 Lipase Units) - Peds 3 Capsule(s) Oral three times a day with meals  pantoprazole  Injectable 40 milliGRAM(s) IV Push two times a day  predniSONE   Tablet 2.5 milliGRAM(s) Oral daily  sertraline 12.5 milliGRAM(s) Oral daily  tamsulosin 0.4 milliGRAM(s) Oral at bedtime    PE:    Lethargic but arousable when spoken to in Hungarian   Lungs:   no rhonchi, no wheezes,   Cor:  RR   Abd:  soft, non tender +BS +BM ? melena   Ext:  Left hip incisions clean and dry w/o any bloody or serous drainage noted          Thigh tense, ecchymosis throughout anterior and posterior extending to knee joint          Unable to illicit any motor response          Foot warm good CR     labs                        7.1    10.95 )-----------( 156      ( 05 Feb 2021 09:20 )             21.1   02-05    146<H>  |  112<H>  |  47<H>  ----------------------------<  153<H>  4.7   |  29  |  1.24    Ca    7.3<L>      05 Feb 2021 06:00  Mg     2.0     02-05

## 2021-02-05 NOTE — PROGRESS NOTE ADULT - ASSESSMENT
86 year old [place]ian speaking male with hx of dementia, pancreatic insufficiency , bph, continues anemic post op s/p ORIF left hip IT Fx.  Patient noted to have melena and will be seen by GI.     Plan:   OOB with PT when medically stable             Lovenox for DVT prophylaxis on hold             will restart DVT prophylaxis when patient medically stable             R/o melena vs anemia of acute blood loss

## 2021-02-05 NOTE — CONSULT NOTE ADULT - SUBJECTIVE AND OBJECTIVE BOX
Patient seen and examined.  Full consult dictated and will be available shortly.    Elderly male admitted s/p fall with left hip fracture.  He underwent ORIF.  Reports of melena since last night.  He is confused and unable to provide history.    VSS.  Abdomen soft, nontender to palpation    Impression: Melena.      Plan:  1. Proton pump inhibitor every 12 hours  2. Monitor Hgb/Hct and bowel movements  3. EGD Monday  4. Transfuse PRBC to maintain Hgb>7  5. Diet as tolerated for now

## 2021-02-05 NOTE — PROGRESS NOTE ADULT - SUBJECTIVE AND OBJECTIVE BOX
Patient is a 86y old  Male who presents with a chief complaint of left hip pain s/p fall (05 Feb 2021 00:47)  + melena overnight.  Lovenox placed on hold    Patient seen and examined at bedside.    ALLERGIES:  No Known Allergies    MEDICATIONS  (STANDING):  acetaminophen   Tablet .. 975 milliGRAM(s) Oral every 8 hours  calcium carbonate 1250 mG  + Vitamin D (OsCal 500 + D) 1 Tablet(s) Oral daily  cholecalciferol 1000 Unit(s) Oral daily  donepezil 5 milliGRAM(s) Oral at bedtime  lactated ringers. 1000 milliLiter(s) (50 mL/Hr) IV Continuous <Continuous>  pancrelipase Oral Capsule (CREON 24,000 Lipase Units) - Peds 3 Capsule(s) Oral three times a day with meals  pantoprazole  Injectable 40 milliGRAM(s) IV Push two times a day  predniSONE   Tablet 2.5 milliGRAM(s) Oral daily  sertraline 12.5 milliGRAM(s) Oral daily  tamsulosin 0.4 milliGRAM(s) Oral at bedtime    MEDICATIONS  (PRN):  morphine  - Injectable 4 milliGRAM(s) IV Push every 8 hours PRN Severe Pain (7 - 10)  oxyCODONE    IR 5 milliGRAM(s) Oral every 8 hours PRN Moderate Pain (4 - 6)  senna 2 Tablet(s) Oral at bedtime PRN Constipation    Vital Signs Last 24 Hrs  T(F): 97.4 (05 Feb 2021 08:03), Max: 98.7 (04 Feb 2021 12:13)  HR: 80 (05 Feb 2021 08:03) (80 - 98)  BP: 118/49 (05 Feb 2021 08:03) (90/49 - 132/72)  RR: 16 (05 Feb 2021 08:03) (16 - 17)  SpO2: 99% (05 Feb 2021 08:03) (99% - 100%)  I&O's Summary    04 Feb 2021 07:01  -  05 Feb 2021 07:00  --------------------------------------------------------  IN: 839 mL / OUT: 0 mL / NET: 839 mL      BMI (kg/m2): 24.2 (02-01-21 @ 08:56)  PHYSICAL EXAM:  General: alert, awake, elderly  ENT: MM dry, no thrush  Neck: Supple, No JVD  Lungs: Non labored breathing,  Clear to auscultation bilaterally,   Cardio: RRR, S1/S2, no pitting edema bilaterally  Abdomen: Soft, Nontender, Nondistended; Bowel sounds present  Extremities: LLE thigh swollen and ecchymotic, TTP, no calf tenderness     LABS:                        7.1    10.95 )-----------( 156      ( 05 Feb 2021 09:20 )             21.1       02-05    146  |  112  |  47  ----------------------------<  153  4.7   |  29  |  1.24    Ca    7.3      05 Feb 2021 06:00  Mg     2.0     02-05       eGFR if Non African American: 52 mL/min/1.73M2 (02-05-21 @ 06:00)  eGFR if African American: 60 mL/min/1.73M2 (02-05-21 @ 06:00)                                     RADIOLOGY & ADDITIONAL TESTS:    Care Discussed with Consultants/Other Providers:

## 2021-02-05 NOTE — PROGRESS NOTE ADULT - ASSESSMENT
86 year old male with a PMH of dementia, pancreatic insufficiency, urinary incontinence, bph, frequent falls, BIBEMS because of left hip pain, unable to stand after tripping on carpet and falling on his left side.  Patient with left femoral fracture. Patient is s/p L ORIF.  Hospital course c/b melena    #Subtrochanteric fracture of left femur  - s/p left hip ORIF 2/1, POD 4  - ortho following  - Pain control - w standing tylenol for improved pain control, continue PRN oxycodone and morphine for severe pain  - DVT ppx placed on hold given melena    #Melena:  one reported episode of melena last night  Hgb at 5 AM was 7.9. Repeat Hgb at 9 AM was 7.1  DVT ppx on HOLD  IV Protonix BID  GI consult appreciated  Maintain active T+S  Repeat 12 PM CBC--transfuse if Hgb < 7  Stool guiac pending       #Dementia   - Continue donepezil, sertraline       #BPH  - continue tamsulosin      #Pancreatic insufficiency   - Continue creon, prednisone  ( As per wife, pt has been on Prednisone since 2011 - was prescribed for ?Chronic pancreatitis)    #DVT ppx  - hold Lovenox in setting of acute blood loss anemia     Dispo: PT recommending [facility], for discharge once medically stable   Case d/w Wife, Ofe.  86 year old male with a PMH of dementia, pancreatic insufficiency, urinary incontinence, bph, frequent falls, BIBEMS because of left hip pain, unable to stand after tripping on carpet and falling on his left side.  Patient with left femoral fracture. Patient is s/p L ORIF.  Hospital course c/b melena    #Subtrochanteric fracture of left femur  - s/p left hip ORIF 2/1, POD 4  - ortho following  - Pain control - w standing tylenol for improved pain control, continue PRN oxycodone and morphine for severe pain  - DVT ppx placed on hold given melena    #Melena:  one reported episode of melena last night  Hgb at 5 AM was 7.9. Repeat Hgb at 9 AM was 7.1  DVT ppx on HOLD  IV Protonix BID  GI consult appreciated  Maintain active T+S  Repeat 12 PM CBC--transfuse if Hgb < 7  Stool guiac pending       #Dementia   - Continue donepezil, sertraline       #BPH  - continue tamsulosin      #Pancreatic insufficiency   - Continue creon, prednisone  ( As per wife, pt has been on Prednisone since 2011 - was prescribed for ?Chronic pancreatitis)    #DVT ppx  - hold Lovenox in setting of acute blood loss anemia     Dispo: PT recommending [facility], for discharge once medically stable   Case d/w WifeOfe using interpretor ID [id number]. 86 year old male with a PMH of dementia, pancreatic insufficiency, urinary incontinence, bph, frequent falls, BIBEMS because of left hip pain, unable to stand after tripping on carpet and falling on his left side.  Patient with left femoral fracture. Patient is s/p L ORIF.  Hospital course c/b melena    #Subtrochanteric fracture of left femur  - s/p left hip ORIF 2/1, POD 4  - ortho following  - Pain control - w standing tylenol for improved pain control, continue PRN oxycodone and morphine for severe pain  - DVT ppx placed on hold given melena    #Melena:  one reported episode of melena last night  Hgb at 5 AM was 7.9. Repeat Hgb at 9 AM was 7.1  DVT ppx on HOLD  IV Protonix BID  GI consult appreciated  Maintain active T+S  Repeat 12 PM CBC--transfuse if Hgb < 7  Stool guiac pending       #Dementia   - Continue donepezil, sertraline       #BPH  - continue tamsulosin      #Pancreatic insufficiency   - Continue creon, prednisone  ( As per wife, pt has been on Prednisone since 2011 - was prescribed for ?Chronic pancreatitis)    #DVT ppx  - hold Lovenox in setting of acute blood loss anemia     Dispo: PT recommending [facility], for discharge once medically stable   Case d/w Wife Ofe using interpretor ID [id number].  Code Status: FULL CODE -reviewed with wife via interpretor

## 2021-02-06 LAB
ANION GAP SERPL CALC-SCNC: 7 MMOL/L — SIGNIFICANT CHANGE UP (ref 5–17)
BUN SERPL-MCNC: 47 MG/DL — HIGH (ref 7–23)
CALCIUM SERPL-MCNC: 7.5 MG/DL — LOW (ref 8.4–10.5)
CHLORIDE SERPL-SCNC: 112 MMOL/L — HIGH (ref 96–108)
CO2 SERPL-SCNC: 27 MMOL/L — SIGNIFICANT CHANGE UP (ref 22–31)
CREAT SERPL-MCNC: 1.17 MG/DL — SIGNIFICANT CHANGE UP (ref 0.5–1.3)
GLUCOSE SERPL-MCNC: 125 MG/DL — HIGH (ref 70–99)
HCT VFR BLD CALC: 19.5 % — CRITICAL LOW (ref 39–50)
HCT VFR BLD CALC: 29.1 % — LOW (ref 39–50)
HGB BLD-MCNC: 5.9 G/DL — CRITICAL LOW (ref 13–17)
HGB BLD-MCNC: 9.3 G/DL — LOW (ref 13–17)
MCHC RBC-ENTMCNC: 30.3 GM/DL — LOW (ref 32–36)
MCHC RBC-ENTMCNC: 30.3 PG — SIGNIFICANT CHANGE UP (ref 27–34)
MCHC RBC-ENTMCNC: 31.2 PG — SIGNIFICANT CHANGE UP (ref 27–34)
MCHC RBC-ENTMCNC: 32 GM/DL — SIGNIFICANT CHANGE UP (ref 32–36)
MCV RBC AUTO: 103.2 FL — HIGH (ref 80–100)
MCV RBC AUTO: 94.8 FL — SIGNIFICANT CHANGE UP (ref 80–100)
NRBC # BLD: 18 /100 WBCS — HIGH (ref 0–0)
NRBC # BLD: 29 /100 WBCS — HIGH (ref 0–0)
PLATELET # BLD AUTO: 125 K/UL — LOW (ref 150–400)
PLATELET # BLD AUTO: 177 K/UL — SIGNIFICANT CHANGE UP (ref 150–400)
POTASSIUM SERPL-MCNC: 4.3 MMOL/L — SIGNIFICANT CHANGE UP (ref 3.5–5.3)
POTASSIUM SERPL-SCNC: 4.3 MMOL/L — SIGNIFICANT CHANGE UP (ref 3.5–5.3)
RBC # BLD: 1.89 M/UL — LOW (ref 4.2–5.8)
RBC # BLD: 3.07 M/UL — LOW (ref 4.2–5.8)
RBC # FLD: 14.5 % — SIGNIFICANT CHANGE UP (ref 10.3–14.5)
RBC # FLD: 15.1 % — HIGH (ref 10.3–14.5)
SODIUM SERPL-SCNC: 146 MMOL/L — HIGH (ref 135–145)
WBC # BLD: 12.02 K/UL — HIGH (ref 3.8–10.5)
WBC # BLD: 12.61 K/UL — HIGH (ref 3.8–10.5)
WBC # FLD AUTO: 12.02 K/UL — HIGH (ref 3.8–10.5)
WBC # FLD AUTO: 12.61 K/UL — HIGH (ref 3.8–10.5)

## 2021-02-06 PROCEDURE — 99233 SBSQ HOSP IP/OBS HIGH 50: CPT

## 2021-02-06 RX ADMIN — Medication 3 CAPSULE(S): at 16:00

## 2021-02-06 RX ADMIN — Medication 975 MILLIGRAM(S): at 12:25

## 2021-02-06 RX ADMIN — DONEPEZIL HYDROCHLORIDE 5 MILLIGRAM(S): 10 TABLET, FILM COATED ORAL at 21:23

## 2021-02-06 RX ADMIN — Medication 1000 UNIT(S): at 08:57

## 2021-02-06 RX ADMIN — Medication 3 CAPSULE(S): at 12:24

## 2021-02-06 RX ADMIN — SERTRALINE 12.5 MILLIGRAM(S): 25 TABLET, FILM COATED ORAL at 08:57

## 2021-02-06 RX ADMIN — SODIUM CHLORIDE 50 MILLILITER(S): 9 INJECTION, SOLUTION INTRAVENOUS at 19:59

## 2021-02-06 RX ADMIN — Medication 975 MILLIGRAM(S): at 05:17

## 2021-02-06 RX ADMIN — Medication 1 TABLET(S): at 08:57

## 2021-02-06 RX ADMIN — SENNA PLUS 2 TABLET(S): 8.6 TABLET ORAL at 21:23

## 2021-02-06 RX ADMIN — Medication 975 MILLIGRAM(S): at 21:23

## 2021-02-06 RX ADMIN — Medication 3 CAPSULE(S): at 08:57

## 2021-02-06 RX ADMIN — TAMSULOSIN HYDROCHLORIDE 0.4 MILLIGRAM(S): 0.4 CAPSULE ORAL at 21:23

## 2021-02-06 RX ADMIN — PANTOPRAZOLE SODIUM 40 MILLIGRAM(S): 20 TABLET, DELAYED RELEASE ORAL at 05:17

## 2021-02-06 RX ADMIN — PANTOPRAZOLE SODIUM 40 MILLIGRAM(S): 20 TABLET, DELAYED RELEASE ORAL at 16:00

## 2021-02-06 RX ADMIN — Medication 2.5 MILLIGRAM(S): at 05:17

## 2021-02-06 NOTE — PROGRESS NOTE ADULT - ASSESSMENT
86 year old male with a PMH of dementia, pancreatic insufficiency, urinary incontinence, bph, frequent falls, BIBEMS because of left hip pain, unable to stand after tripping on carpet and falling on his left side.  Patient with left femoral fracture. Patient is s/p L ORIF.  Hospital course c/b melena    #Subtrochanteric fracture of left femur  - s/p left hip ORIF 2/1, POD 5  - ortho following  - Pain control - w standing tylenol for improved pain control, continue PRN oxycodone and morphine for severe pain  - DVT ppx placed on hold given melena    #Melena:  one reported episode of melena last night  Hgb at 5 AM was 7.9. Repeat Hgb at 9 AM was 7.1  DVT ppx on HOLD  IV Protonix BID  GI consult appreciated  Maintain active T+S  Repeat 12 PM CBC--transfuse if Hgb < 7  Stool guiac pending       #Dementia   - Continue donepezil, sertraline       #BPH  - continue tamsulosin      #Pancreatic insufficiency   - Continue creon, prednisone  ( As per wife, pt has been on Prednisone since 2011 - was prescribed for ?Chronic pancreatitis)    #DVT ppx  - hold Lovenox in setting of acute blood loss anemia     Dispo: PT recommending [facility], for discharge once medically stable   Case d/w Wife Ofe using interpretor ID [id number].  Code Status: FULL CODE -reviewed with wife via interpretor  86 year old male with a PMH of dementia, pancreatic insufficiency, urinary incontinence, bph, frequent falls, BIBEMS because of left hip pain, unable to stand after tripping on carpet and falling on his left side.  Patient with left femoral fracture. Patient is s/p L ORIF.  Hospital course c/b melena    #Subtrochanteric fracture of left femur  - s/p left hip ORIF 2/1, POD 5  - ortho following  - Pain control - w standing tylenol for improved pain control, continue PRN oxycodone and morphine for severe pain  - DVT ppx placed on hold given melena    #Melena:  one reported episode of melena last night  H/H 5.9/19.5 will transfuse prbc   DVT ppx on HOLD  IV Protonix BID  GI consult appreciated- Upper endo Monday   Maintain active T+S  Stool guiac pending       #Dementia   - Continue donepezil, sertraline       #BPH  - continue tamsulosin      #Pancreatic insufficiency   - Continue creon, prednisone  ( As per wife, pt has been on Prednisone since 2011 - was prescribed for ?Chronic pancreatitis)    #DVT ppx  - hold Lovenox in setting of acute blood loss anemia     Dispo: PT recommending [facility], for discharge once medically stable   Case d/w Wife Ofe using interpretor ID [id number].  Code Status: FULL CODE -reviewed with wife via interpretor  86 year old male with a PMH of dementia, pancreatic insufficiency, urinary incontinence, bph, frequent falls, BIBEMS because of left hip pain, unable to stand after tripping on carpet and falling on his left side.  Patient with left femoral fracture. Patient is s/p L ORIF.  Hospital course c/b melena    #Subtrochanteric fracture of left femur  - s/p left hip ORIF 2/1, POD 5  - ortho following  - Pain control - w standing tylenol for improved pain control, continue PRN oxycodone and morphine for severe pain  - DVT ppx placed on hold given melena    #Melena:  one reported episode of melena last night  H/H 5.9/19.5 will transfuse 2 units prbc   DVT ppx on HOLD  IV Protonix BID  GI consult appreciated- Upper endo Monday   Maintain active T+S  Stool guiac pending   follow up labs in am       #Dementia   - Continue donepezil, sertraline       #BPH  - continue tamsulosin      #Pancreatic insufficiency   - Continue creon, prednisone  ( As per wife, pt has been on Prednisone since 2011 - was prescribed for ?Chronic pancreatitis)    #DVT ppx  - hold Lovenox in setting of acute blood loss anemia     Dispo: PT recommending [facility], for discharge once medically stable   Case d/w Wife Ofe using interpretor ID [id number].  Code Status: FULL CODE -reviewed with wife via interpretor  86 year old male with a PMH of dementia, pancreatic insufficiency, urinary incontinence, bph, frequent falls, BIBEMS because of left hip pain, unable to stand after tripping on carpet and falling on his left side.  Patient with left femoral fracture. Patient is s/p L ORIF.  Hospital course c/b melena    #Subtrochanteric fracture of left femur  - s/p left hip ORIF 2/1, POD 5  - ortho following  - Pain control - w standing tylenol for improved pain control, continue PRN oxycodone and morphine for severe pain  - DVT ppx placed on hold given melena    #Melena  one reported episode of melena last night  H/H 5.9/19.5 will transfuse 2 units prbc   DVT ppx on HOLD  IV Protonix BID  GI consult appreciated- Upper endo Monday   Maintain active T+S  Stool guiac pending   follow up labs in am       #Dementia   - Continue donepezil, sertraline       #BPH  - continue tamsulosin      #Pancreatic insufficiency   - Continue creon, prednisone  ( As per wife, pt has been on Prednisone since 2011 - was prescribed for ?Chronic pancreatitis)    #DVT ppx  - hold Lovenox in setting of acute blood loss anemia     Dispo: PT recommending [facility], for discharge once medically stable   Case d/w WifeOfe using interpretor ID [id number].  Code Status: FULL CODE -reviewed with wife via interpretor

## 2021-02-06 NOTE — PROGRESS NOTE ADULT - ASSESSMENT
86 year old English speaking male with hx of dementia, pancreatic insufficiency , bph, anxiety , is s/p ORIF left hip .    Post op course has been complicated   by anemia of acute blood loss 2/2 surgery and now r/o GI bleed.        Plan:   Resume PT WBAT left leg when medically stable             Transfuse 1 unit PRBC              SCD s bilateral for DVT prophylaxis              Lovenox stopped 2/2 to anemia, GI bleed, serous drainage             check labs in am              EGD on monday as per Dr Fernando

## 2021-02-06 NOTE — PROGRESS NOTE ADULT - SUBJECTIVE AND OBJECTIVE BOX
S/P ORIF left hip IT fracture   POD #5    Patient was seen and examined by me today .  Patient is awake and alert today.  He c/o pain when touched.  He denies CP or SOB    Vital Signs Last 24 Hrs  T(C): 36.4 (06 Feb 2021 11:36), Max: 37.5 (05 Feb 2021 21:15)  T(F): 97.6 (06 Feb 2021 11:36), Max: 99.5 (05 Feb 2021 21:15)  HR: 91 (06 Feb 2021 11:36) (89 - 119)  BP: 116/59 (06 Feb 2021 11:36) (100/54 - 148/77)  BP(mean): 78 (06 Feb 2021 11:36) (64 - 78)  RR: 16 (06 Feb 2021 11:36) (16 - 16)  SpO2: 95% (06 Feb 2021 11:36) (93% - 95%)    PAST MEDICAL & SURGICAL HISTORY:  Dementia  Pancreatic insufficiency  Incontinence  Nocturia  x1-2  BPH (benign prostatic hyperplasia)  Elevated blood sugar  secondary to steroids  Anxiety  Memory loss, short term  Poor historian  both patient and wife  Language barrier  Pancreatic mass  be  Pancreatitis  2011  Basal cell carcinoma  left shoulder  S/P biopsy  pancreatic mass 2011, benign  S/P colonoscopy  2014, no polyps    MEDICATIONS  (STANDING):  acetaminophen   Tablet .. 975 milliGRAM(s) Oral every 8 hours  calcium carbonate 1250 mG  + Vitamin D (OsCal 500 + D) 1 Tablet(s) Oral daily  cholecalciferol 1000 Unit(s) Oral daily  donepezil 5 milliGRAM(s) Oral at bedtime  lactated ringers. 1000 milliLiter(s) (50 mL/Hr) IV Continuous <Continuous>  pancrelipase Oral Capsule (CREON 24,000 Lipase Units) - Peds 3 Capsule(s) Oral three times a day with meals  pantoprazole  Injectable 40 milliGRAM(s) IV Push two times a day  predniSONE   Tablet 2.5 milliGRAM(s) Oral daily  sertraline 12.5 milliGRAM(s) Oral daily  tamsulosin 0.4 milliGRAM(s) Oral at bedtime    PE:  Alert this am x1-2 Ivorian speaking   Lungs:  CTA   Cor:  S1S2   Abd:  soft, tender on palpation epigastric area +BS +BM yesterday (?melena)  Ext:   Left hip incisions , staples intact, serous fluid draining from sites 2/2 to Lovenox (already dc'd )          Thigh less tense, calf soft, +neurovascular intact   SCD' s while in bed                          5.9    12.61 )-----------( 125      ( 06 Feb 2021 07:00 )             19.5   02-06    146<H>  |  112<H>  |  47<H>  ----------------------------<  125<H>  4.3   |  27  |  1.17    Ca    7.5<L>      06 Feb 2021 07:00  Mg     2.0     02-05

## 2021-02-06 NOTE — PROGRESS NOTE ADULT - SUBJECTIVE AND OBJECTIVE BOX
Patient is a 86y old  Male who presents with a chief complaint of left hip pain s/p fall (05 Feb 2021 17:34)      Patient seen and examined at bedside.    ALLERGIES:  No Known Allergies    MEDICATIONS  (STANDING):  acetaminophen   Tablet .. 975 milliGRAM(s) Oral every 8 hours  calcium carbonate 1250 mG  + Vitamin D (OsCal 500 + D) 1 Tablet(s) Oral daily  cholecalciferol 1000 Unit(s) Oral daily  donepezil 5 milliGRAM(s) Oral at bedtime  lactated ringers. 1000 milliLiter(s) (50 mL/Hr) IV Continuous <Continuous>  pancrelipase Oral Capsule (CREON 24,000 Lipase Units) - Peds 3 Capsule(s) Oral three times a day with meals  pantoprazole  Injectable 40 milliGRAM(s) IV Push two times a day  predniSONE   Tablet 2.5 milliGRAM(s) Oral daily  sertraline 12.5 milliGRAM(s) Oral daily  tamsulosin 0.4 milliGRAM(s) Oral at bedtime    MEDICATIONS  (PRN):  morphine  - Injectable 4 milliGRAM(s) IV Push every 8 hours PRN Severe Pain (7 - 10)  oxyCODONE    IR 5 milliGRAM(s) Oral every 8 hours PRN Moderate Pain (4 - 6)  senna 2 Tablet(s) Oral at bedtime PRN Constipation    Vital Signs Last 24 Hrs  T(F): 99.2 (06 Feb 2021 05:34), Max: 99.5 (05 Feb 2021 21:15)  HR: 89 (06 Feb 2021 05:34) (80 - 119)  BP: 100/54 (06 Feb 2021 05:34) (100/54 - 139/72)  RR: 16 (06 Feb 2021 05:34) (16 - 16)  SpO2: 95% (06 Feb 2021 05:34) (94% - 99%)  I&O's Summary    05 Feb 2021 07:01  -  06 Feb 2021 07:00  --------------------------------------------------------  IN: 100 mL / OUT: 0 mL / NET: 100 mL      PHYSICAL EXAM:  General: NAD, A/O x 3  ENT: MMM  Neck: Supple, No JVD  Lungs: Clear to auscultation bilaterally, Non labored breathing   Cardio: RRR, S1/S2, No murmurs  Abdomen: Soft, Nontender, Nondistended; Bowel sounds present  Extremities: No calf tenderness, No pitting edema    LABS:                        7.6    12.34 )-----------( 189      ( 05 Feb 2021 12:20 )             23.0     02-05    146  |  112  |  47  ----------------------------<  153  4.7   |  29  |  1.24    Ca    7.3      05 Feb 2021 06:00  Mg     2.0     02-05      eGFR if Non African American: 52 mL/min/1.73M2 (02-05-21 @ 06:00)  eGFR if African American: 60 mL/min/1.73M2 (02-05-21 @ 06:00)                                  RADIOLOGY & ADDITIONAL TESTS:    Care Discussed with Consultants/Other Providers:    Patient is a 86y old  Male who presents with a chief complaint of left hip pain s/p fall (05 Feb 2021 17:34)      Patient seen and examined at bedside.  Pt lethargic but arousable . without complaints, slept well     ALLERGIES:  No Known Allergies    MEDICATIONS  (STANDING):  acetaminophen   Tablet .. 975 milliGRAM(s) Oral every 8 hours  calcium carbonate 1250 mG  + Vitamin D (OsCal 500 + D) 1 Tablet(s) Oral daily  cholecalciferol 1000 Unit(s) Oral daily  donepezil 5 milliGRAM(s) Oral at bedtime  lactated ringers. 1000 milliLiter(s) (50 mL/Hr) IV Continuous <Continuous>  pancrelipase Oral Capsule (CREON 24,000 Lipase Units) - Peds 3 Capsule(s) Oral three times a day with meals  pantoprazole  Injectable 40 milliGRAM(s) IV Push two times a day  predniSONE   Tablet 2.5 milliGRAM(s) Oral daily  sertraline 12.5 milliGRAM(s) Oral daily  tamsulosin 0.4 milliGRAM(s) Oral at bedtime    MEDICATIONS  (PRN):  morphine  - Injectable 4 milliGRAM(s) IV Push every 8 hours PRN Severe Pain (7 - 10)  oxyCODONE    IR 5 milliGRAM(s) Oral every 8 hours PRN Moderate Pain (4 - 6)  senna 2 Tablet(s) Oral at bedtime PRN Constipation    Vital Signs Last 24 Hrs  T(F): 99.2 (06 Feb 2021 05:34), Max: 99.5 (05 Feb 2021 21:15)  HR: 89 (06 Feb 2021 05:34) (80 - 119)  BP: 100/54 (06 Feb 2021 05:34) (100/54 - 139/72)  RR: 16 (06 Feb 2021 05:34) (16 - 16)  SpO2: 95% (06 Feb 2021 05:34) (94% - 99%)  I&O's Summary    05 Feb 2021 07:01  -  06 Feb 2021 07:00  --------------------------------------------------------  IN: 100 mL / OUT: 0 mL / NET: 100 mL      PHYSICAL EXAM:  General: 85y/o male lethargic but in NAD, A/O x1-2  ENT: MMM  Neck: Supple, No JVD  Lungs: Clear to auscultation bilaterally, Non labored breathing   Cardio: RRR, S1/S2, No murmurs  Abdomen: Soft, Nontender, Nondistended; Bowel sounds present  Extremities: No calf tenderness, No pitting edema.  s/p Left ORIF area clean with dressing in place     LABS:                                   5.9    12.61 )-----------( 125      ( 06 Feb 2021 07:00 )             19.5                02-06    146<H>  |  112<H>  |  47<H>  ----------------------------<  125<H>  4.3   |  27  |  1.17    Ca    7.5<L>      06 Feb 2021 07:00  Mg     2.0     02-05              eGFR if Non African American: 52 mL/min/1.73M2 (02-05-21 @ 06:00)  eGFR if African American: 60 mL/min/1.73M2 (02-05-21 @ 06:00)                                  RADIOLOGY & ADDITIONAL TESTS:    Care Discussed with Consultants/Other Providers:

## 2021-02-07 ENCOUNTER — TRANSCRIPTION ENCOUNTER (OUTPATIENT)
Age: 86
End: 2021-02-07

## 2021-02-07 DIAGNOSIS — S72.002A FRACTURE OF UNSPECIFIED PART OF NECK OF LEFT FEMUR, INITIAL ENCOUNTER FOR CLOSED FRACTURE: ICD-10-CM

## 2021-02-07 LAB
ANION GAP SERPL CALC-SCNC: 5 MMOL/L — SIGNIFICANT CHANGE UP (ref 5–17)
BUN SERPL-MCNC: 38 MG/DL — HIGH (ref 7–23)
CALCIUM SERPL-MCNC: 7.5 MG/DL — LOW (ref 8.4–10.5)
CHLORIDE SERPL-SCNC: 113 MMOL/L — HIGH (ref 96–108)
CO2 SERPL-SCNC: 30 MMOL/L — SIGNIFICANT CHANGE UP (ref 22–31)
CREAT SERPL-MCNC: 1.05 MG/DL — SIGNIFICANT CHANGE UP (ref 0.5–1.3)
GLUCOSE SERPL-MCNC: 104 MG/DL — HIGH (ref 70–99)
HCT VFR BLD CALC: 28.3 % — LOW (ref 39–50)
HGB BLD-MCNC: 9.2 G/DL — LOW (ref 13–17)
MCHC RBC-ENTMCNC: 31.3 PG — SIGNIFICANT CHANGE UP (ref 27–34)
MCHC RBC-ENTMCNC: 32.5 GM/DL — SIGNIFICANT CHANGE UP (ref 32–36)
MCV RBC AUTO: 96.3 FL — SIGNIFICANT CHANGE UP (ref 80–100)
NRBC # BLD: 23 /100 WBCS — HIGH (ref 0–0)
OB PNL STL: POSITIVE
PLATELET # BLD AUTO: 176 K/UL — SIGNIFICANT CHANGE UP (ref 150–400)
POTASSIUM SERPL-MCNC: 3.9 MMOL/L — SIGNIFICANT CHANGE UP (ref 3.5–5.3)
POTASSIUM SERPL-SCNC: 3.9 MMOL/L — SIGNIFICANT CHANGE UP (ref 3.5–5.3)
RBC # BLD: 2.94 M/UL — LOW (ref 4.2–5.8)
RBC # FLD: 14.6 % — HIGH (ref 10.3–14.5)
SODIUM SERPL-SCNC: 148 MMOL/L — HIGH (ref 135–145)
WBC # BLD: 9.42 K/UL — SIGNIFICANT CHANGE UP (ref 3.8–10.5)
WBC # FLD AUTO: 9.42 K/UL — SIGNIFICANT CHANGE UP (ref 3.8–10.5)

## 2021-02-07 PROCEDURE — 99233 SBSQ HOSP IP/OBS HIGH 50: CPT

## 2021-02-07 PROCEDURE — 99231 SBSQ HOSP IP/OBS SF/LOW 25: CPT

## 2021-02-07 RX ORDER — ASCORBIC ACID 60 MG
500 TABLET,CHEWABLE ORAL DAILY
Refills: 0 | Status: DISCONTINUED | OUTPATIENT
Start: 2021-02-07 | End: 2021-02-09

## 2021-02-07 RX ADMIN — Medication 1 TABLET(S): at 12:17

## 2021-02-07 RX ADMIN — Medication 500 MILLIGRAM(S): at 12:17

## 2021-02-07 RX ADMIN — Medication 3 CAPSULE(S): at 16:01

## 2021-02-07 RX ADMIN — Medication 3 CAPSULE(S): at 08:36

## 2021-02-07 RX ADMIN — SERTRALINE 12.5 MILLIGRAM(S): 25 TABLET, FILM COATED ORAL at 08:35

## 2021-02-07 RX ADMIN — Medication 1000 UNIT(S): at 08:35

## 2021-02-07 RX ADMIN — Medication 975 MILLIGRAM(S): at 21:09

## 2021-02-07 RX ADMIN — TAMSULOSIN HYDROCHLORIDE 0.4 MILLIGRAM(S): 0.4 CAPSULE ORAL at 21:09

## 2021-02-07 RX ADMIN — PANTOPRAZOLE SODIUM 40 MILLIGRAM(S): 20 TABLET, DELAYED RELEASE ORAL at 16:02

## 2021-02-07 RX ADMIN — DONEPEZIL HYDROCHLORIDE 5 MILLIGRAM(S): 10 TABLET, FILM COATED ORAL at 21:09

## 2021-02-07 RX ADMIN — Medication 975 MILLIGRAM(S): at 12:17

## 2021-02-07 RX ADMIN — Medication 2.5 MILLIGRAM(S): at 05:34

## 2021-02-07 RX ADMIN — Medication 1 TABLET(S): at 08:35

## 2021-02-07 RX ADMIN — PANTOPRAZOLE SODIUM 40 MILLIGRAM(S): 20 TABLET, DELAYED RELEASE ORAL at 05:34

## 2021-02-07 RX ADMIN — Medication 975 MILLIGRAM(S): at 05:34

## 2021-02-07 RX ADMIN — Medication 3 CAPSULE(S): at 12:17

## 2021-02-07 RX ADMIN — SODIUM CHLORIDE 50 MILLILITER(S): 9 INJECTION, SOLUTION INTRAVENOUS at 16:01

## 2021-02-07 NOTE — PROGRESS NOTE ADULT - SUBJECTIVE AND OBJECTIVE BOX
86 year old male with a PMH of dementia, pancreatic insufficiency, urinary incontinence, bph, frequent falls, BIBEMS because of left hip pain, unable to stand after tripping on carpet and falling on his left side.  Patient with left femoral fracture. Patient is s/p L ORIF POD 6, appears comfortable in bed, as per rn received 2UPRBC. no melena bm overnight.     PAST MEDICAL & SURGICAL HISTORY:  Dementia    Pancreatic insufficiency    Incontinence    Nocturia  x1-2    BPH (benign prostatic hyperplasia)    Elevated blood sugar  secondary to steroids    Anxiety    Memory loss, short term    Poor historian  both patient and wife    Language barrier    Pancreatic mass  benign 2011    Pancreatitis  2011    Basal cell carcinoma  left shoulder    S/P biopsy  pancreatic mass 2011, benign    S/P colonoscopy  2014, no polyps    MEDICATIONS  (STANDING):  acetaminophen   Tablet .. 975 milliGRAM(s) Oral every 8 hours  calcium carbonate 1250 mG  + Vitamin D (OsCal 500 + D) 1 Tablet(s) Oral daily  cholecalciferol 1000 Unit(s) Oral daily  donepezil 5 milliGRAM(s) Oral at bedtime  lactated ringers. 1000 milliLiter(s) (50 mL/Hr) IV Continuous <Continuous>  pancrelipase Oral Capsule (CREON 24,000 Lipase Units) - Peds 3 Capsule(s) Oral three times a day with meals  pantoprazole  Injectable 40 milliGRAM(s) IV Push two times a day  predniSONE   Tablet 2.5 milliGRAM(s) Oral daily  sertraline 12.5 milliGRAM(s) Oral daily  tamsulosin 0.4 milliGRAM(s) Oral at bedtime    MEDICATIONS  (PRN):  morphine  - Injectable 4 milliGRAM(s) IV Push every 8 hours PRN Severe Pain (7 - 10)  oxyCODONE    IR 5 milliGRAM(s) Oral every 8 hours PRN Moderate Pain (4 - 6)  senna 2 Tablet(s) Oral at bedtime PRN Constipation    PE:  Vital Signs Last 24 Hrs  T(C): 36.3 (07 Feb 2021 05:00), Max: 36.9 (06 Feb 2021 09:11)  T(F): 97.3 (07 Feb 2021 05:00), Max: 98.4 (06 Feb 2021 09:11)  HR: 93 (07 Feb 2021 05:00) (89 - 102)  BP: 128/59 (07 Feb 2021 05:00) (104/44 - 148/77)  BP(mean): 78 (06 Feb 2021 11:36) (64 - 78)  RR: 16 (07 Feb 2021 05:00) (16 - 16)  SpO2: 96% (07 Feb 2021 05:00) (93% - 96%)  Gen: Awake, responds to name   abd: Soft, nd, nt, ng  LLE: Moves toes and ankle upon touch, thigh tense, swelling and tender, staples intact, mild bruising noted on left groin, no active bleeding, moves extremity                          9.2    9.42  )-----------( 176      ( 07 Feb 2021 06:57 )             28.3   02-06    146<H>  |  112<H>  |  47<H>  ----------------------------<  125<H>  4.3   |  27  |  1.17    Ca    7.5<L>      06 Feb 2021 07:00

## 2021-02-07 NOTE — DIETITIAN INITIAL EVALUATION ADULT. - OTHER INFO
86 year old male with a PMH of dementia, pancreatic insufficiency, urinary incontinence, bph, frequent falls, BIBEMS because of left hip pain, now pt with left femoral fracture s/p L ORIF. Hospital course c/b melena.   Pt noted with plan for upper endoscopy tomorrow, NPO at midnight. Pt has been eating 0-25% over the last 7 days. Reports that he is not feeling well enough to eat. Has not been eating foods that he typically likes. Admission weight 142.4lbs compared with current weight (2/7) 136.6lbs indicates 4% weight loss within 1 week. Stage II pressure ulcer on sacrum noted- recommend adding MVI, Vitamin C. Pt receptive to Ensure Enlive 8oz TID (chocolate or strawberry). PCA suggests adding extra sauce/gravy to meals for added moisture. IVF infusing to meet hydration needs. Last BM 2/4.

## 2021-02-07 NOTE — DIETITIAN NUTRITION RISK NOTIFICATION - TREATMENT: THE FOLLOWING DIET HAS BEEN RECOMMENDED
Diet, NPO after Midnight:      NPO Start Date: 07-Feb-2021,   NPO Start Time: 23:59 (02-07-21 @ 10:33) [Active]  Diet, Dysphagia 2 Mechanical Soft-Thin Liquids:   Supplement Feeding Modality:  Oral  Ensure Enlive Servings Per Day:  1       Frequency:  Three Times a day (02-07-21 @ 09:38) [Pending Verification By Attending]  Diet, Dysphagia 2 Mechanical Soft-Thin Liquids (02-03-21 @ 09:03) [Active]

## 2021-02-07 NOTE — DIETITIAN INITIAL EVALUATION ADULT. - ORAL INTAKE PTA/DIET HISTORY
Pt Kinyarwanda speaking, but confused. Obtained some preferences from Kinyarwanda Speaking PCA. NKFA per chart review.

## 2021-02-07 NOTE — PROGRESS NOTE ADULT - SUBJECTIVE AND OBJECTIVE BOX
Patient is a 86y old  Male who presents with a chief complaint of left hip pain s/p fall (06 Feb 2021 13:05)      Patient seen and examined at bedside.    ALLERGIES:  No Known Allergies    MEDICATIONS  (STANDING):  acetaminophen   Tablet .. 975 milliGRAM(s) Oral every 8 hours  calcium carbonate 1250 mG  + Vitamin D (OsCal 500 + D) 1 Tablet(s) Oral daily  cholecalciferol 1000 Unit(s) Oral daily  donepezil 5 milliGRAM(s) Oral at bedtime  lactated ringers. 1000 milliLiter(s) (50 mL/Hr) IV Continuous <Continuous>  pancrelipase Oral Capsule (CREON 24,000 Lipase Units) - Peds 3 Capsule(s) Oral three times a day with meals  pantoprazole  Injectable 40 milliGRAM(s) IV Push two times a day  predniSONE   Tablet 2.5 milliGRAM(s) Oral daily  sertraline 12.5 milliGRAM(s) Oral daily  tamsulosin 0.4 milliGRAM(s) Oral at bedtime    MEDICATIONS  (PRN):  morphine  - Injectable 4 milliGRAM(s) IV Push every 8 hours PRN Severe Pain (7 - 10)  oxyCODONE    IR 5 milliGRAM(s) Oral every 8 hours PRN Moderate Pain (4 - 6)  senna 2 Tablet(s) Oral at bedtime PRN Constipation    Vital Signs Last 24 Hrs  T(F): 97.3 (07 Feb 2021 05:00), Max: 98.4 (06 Feb 2021 09:11)  HR: 93 (07 Feb 2021 05:00) (89 - 102)  BP: 128/59 (07 Feb 2021 05:00) (104/44 - 148/77)  RR: 16 (07 Feb 2021 05:00) (16 - 16)  SpO2: 96% (07 Feb 2021 05:00) (93% - 96%)  I&O's Summary    06 Feb 2021 07:01  -  07 Feb 2021 07:00  --------------------------------------------------------  IN: 740 mL / OUT: 0 mL / NET: 740 mL      PHYSICAL EXAM:  General: NAD, A/O x 3  ENT: MMM  Neck: Supple, No JVD  Lungs: Clear to auscultation bilaterally, Non labored breathing   Cardio: RRR, S1/S2, No murmurs  Abdomen: Soft, Nontender, Nondistended; Bowel sounds present  Extremities: No calf tenderness, No pitting edema    LABS:                        9.3    12.02 )-----------( 177      ( 06 Feb 2021 19:49 )             29.1     02-06    146  |  112  |  47  ----------------------------<  125  4.3   |  27  |  1.17    Ca    7.5      06 Feb 2021 07:00  Mg     2.0     02-05      eGFR if Non African American: 56 mL/min/1.73M2 (02-06-21 @ 07:00)  eGFR if : 65 mL/min/1.73M2 (02-06-21 @ 07:00)                                  RADIOLOGY & ADDITIONAL TESTS:    Care Discussed with Consultants/Other Providers:    Patient is a 86y old  Male who presents with a chief complaint of left hip pain s/p fall (06 Feb 2021 13:05)      Patient seen and examined at bedside.  Pt more awake and alet this am , with Malian interpeter at bedside .  pt without pain asking when he can go home .       ALLERGIES:  No Known Allergies    MEDICATIONS  (STANDING):  acetaminophen   Tablet .. 975 milliGRAM(s) Oral every 8 hours  calcium carbonate 1250 mG  + Vitamin D (OsCal 500 + D) 1 Tablet(s) Oral daily  cholecalciferol 1000 Unit(s) Oral daily  donepezil 5 milliGRAM(s) Oral at bedtime  lactated ringers. 1000 milliLiter(s) (50 mL/Hr) IV Continuous <Continuous>  pancrelipase Oral Capsule (CREON 24,000 Lipase Units) - Peds 3 Capsule(s) Oral three times a day with meals  pantoprazole  Injectable 40 milliGRAM(s) IV Push two times a day  predniSONE   Tablet 2.5 milliGRAM(s) Oral daily  sertraline 12.5 milliGRAM(s) Oral daily  tamsulosin 0.4 milliGRAM(s) Oral at bedtime    MEDICATIONS  (PRN):  morphine  - Injectable 4 milliGRAM(s) IV Push every 8 hours PRN Severe Pain (7 - 10)  oxyCODONE    IR 5 milliGRAM(s) Oral every 8 hours PRN Moderate Pain (4 - 6)  senna 2 Tablet(s) Oral at bedtime PRN Constipation    Vital Signs Last 24 Hrs  T(F): 97.3 (07 Feb 2021 05:00), Max: 98.4 (06 Feb 2021 09:11)  HR: 93 (07 Feb 2021 05:00) (89 - 102)  BP: 128/59 (07 Feb 2021 05:00) (104/44 - 148/77)  RR: 16 (07 Feb 2021 05:00) (16 - 16)  SpO2: 96% (07 Feb 2021 05:00) (93% - 96%)  I&O's Summary    06 Feb 2021 07:01  -  07 Feb 2021 07:00  --------------------------------------------------------  IN: 740 mL / OUT: 0 mL / NET: 740 mL      PHYSICAL EXAM:  General: n86 y/o Malian speaking male in  NAD, A/O x 1-2  ENT: MMM  Neck: Supple, No JVD  Lungs: Clear to auscultation bilaterally, Non labored breathing   Cardio: RRR, S1/S2, No murmurs  Abdomen: Soft, Nontender, Nondistended; Bowel sounds present  Extremities: No calf tenderness, No pitting edema    LABS:                        9.3    12.02 )-----------( 177      ( 06 Feb 2021 19:49 )             29.1     02-06    146  |  112  |  47  ----------------------------<  125  4.3   |  27  |  1.17    Ca    7.5      06 Feb 2021 07:00  Mg     2.0     02-05      eGFR if Non African American: 56 mL/min/1.73M2 (02-06-21 @ 07:00)  eGFR if : 65 mL/min/1.73M2 (02-06-21 @ 07:00)                                  RADIOLOGY & ADDITIONAL TESTS:    Care Discussed with Consultants/Other Providers:

## 2021-02-07 NOTE — PROGRESS NOTE ADULT - ASSESSMENT
86 year old male with a PMH of dementia, pancreatic insufficiency, urinary incontinence, bph, frequent falls, BIBEMS because of left hip pain, unable to stand after tripping on carpet and falling on his left side.  Patient with left femoral fracture. Patient is s/p L ORIF POD 6, appears comfortable in bed, as per rn received 2UPRBC. no melena bm overnight.

## 2021-02-07 NOTE — PROGRESS NOTE ADULT - SUBJECTIVE AND OBJECTIVE BOX
Pt. denies abdominal pain. Per patient's nurse, he has not a bowel movement in past two days. Per nursing, patient has had increasing discharge from hip repair site.    Patient's vital signs have been stable  GEN: converses in Yak[place], per nurse only oriented to place  HEENT: NC/AT, pupils reactive  Chest: CTA b/l  Abd: soft, non tender non distended, Bowel sounds heard      Impression: No further episodes of melena. Unclear etiologies.    Plan:  1. Continue proton pump inhibitors.  2. Monitor Hct.Hgb.  3. EGD monday. (tomorrow. Please keep NPO past midnight today).  4. Transfuse PRBC to maintain Hgb >7.      Antolin Coffey MD  [facility]     Pt. denies abdominal pain. Per patient's nurse, he has not a bowel movement in past two days. Per nursing, patient has had increasing discharge from hip repair site.  His Hgb/hct has been stable since 2 PRBC transfusion.    Patient's vital signs have been stable  GEN: converses in [place], per nurse only oriented to place  HEENT: NC/AT, pupils reactive  Chest: CTA b/l  Abd: soft, non tender non distended, Bowel sounds heard      Impression: No further episodes of melena. Unclear etiologies.    Plan:  1. Continue proton pump inhibitors.  2. Monitor Hct.Hgb. Stable since transfusion.  3. EGD monday. (tomorrow. Please keep NPO past midnight today).  4. Transfuse PRBC to maintain Hgb >7.      Antolin Coffey MD  [facility]

## 2021-02-07 NOTE — DIETITIAN INITIAL EVALUATION ADULT. - ADD RECOMMEND
Recommend add MVI, Vitamin C to promote wound healing. Added moisture (gravy, sauces) with meals. Encourage po intake/oral hydration. IVF for hydration at present.

## 2021-02-07 NOTE — PROGRESS NOTE ADULT - ASSESSMENT
86 year old male with a PMH of dementia, pancreatic insufficiency, urinary incontinence, bph, frequent falls, BIBEMS because of left hip pain, unable to stand after tripping on carpet and falling on his left side.  Patient with left femoral fracture. Patient is s/p L ORIF.  Hospital course c/b melena    #Subtrochanteric fracture of left femur  - s/p left hip ORIF 2/1, POD 5  - ortho following  - Pain control - w standing tylenol for improved pain control, continue PRN oxycodone and morphine for severe pain  - DVT ppx placed on hold given melena    #Melena  one reported episode of melena last night  H/H 5.9/19.5 will transfuse 2 units prbc   DVT ppx on HOLD  IV Protonix BID  GI consult appreciated- Upper endo Monday   Maintain active T+S  Stool guiac pending   follow up labs in am       #Dementia   - Continue donepezil, sertraline       #BPH  - continue tamsulosin      #Pancreatic insufficiency   - Continue creon, prednisone  ( As per wife, pt has been on Prednisone since 2011 - was prescribed for ?Chronic pancreatitis)    #DVT ppx  - hold Lovenox in setting of acute blood loss anemia     Dispo: PT recommending [facility], for discharge once medically stable   Case d/w WifeOfe using interpretor ID [id number].  Code Status: FULL CODE -reviewed with wife via interpretor  86 year old male with a PMH of dementia, pancreatic insufficiency, urinary incontinence, bph, frequent falls, BIBEMS because of left hip pain, unable to stand after tripping on carpet and falling on his left side.  Patient with left femoral fracture. Patient is s/p L ORIF.  Hospital course c/b melena    #Subtrochanteric fracture of left femur  - s/p left hip ORIF 2/1, POD 5  - ortho following  - Pain control - w standing tylenol for improved pain control, continue PRN oxycodone and morphine for severe pain  - DVT ppx placed on hold given melena  - dispo plan for [facility] to [facility] when medically stable     #Melena  one reported episode of melena 2/5  H/H  today 9.3/ 28--   transfused 2 units yesterday   DVT ppx on HOLD  IV Protonix BID  GI consult appreciated- Upper endo Monday npo after MN   Maintain active T+S  Stool guiac still pending    follow up labs in am       #Dementia   - Continue donepezil, sertraline       #BPH  - continue tamsulosin      #Pancreatic insufficiency   - Continue creon, prednisone  ( As per wife, pt has been on Prednisone since 2011 - was prescribed for ?Chronic pancreatitis)    #DVT ppx  - hold Lovenox in setting of acute blood loss anemia     Dispo: PT recommending [facility], for discharge once medically stable   Case d/w Wife Ofe using interpretor ID [id number]. planning for [facility]   Code Status: FULL CODE -reviewed with wife via interpretor

## 2021-02-08 ENCOUNTER — RESULT REVIEW (OUTPATIENT)
Age: 86
End: 2021-02-08

## 2021-02-08 LAB
ANION GAP SERPL CALC-SCNC: 7 MMOL/L — SIGNIFICANT CHANGE UP (ref 5–17)
BUN SERPL-MCNC: 31 MG/DL — HIGH (ref 7–23)
CALCIUM SERPL-MCNC: 7.4 MG/DL — LOW (ref 8.4–10.5)
CHLORIDE SERPL-SCNC: 114 MMOL/L — HIGH (ref 96–108)
CO2 SERPL-SCNC: 26 MMOL/L — SIGNIFICANT CHANGE UP (ref 22–31)
CREAT SERPL-MCNC: 0.89 MG/DL — SIGNIFICANT CHANGE UP (ref 0.5–1.3)
GLUCOSE SERPL-MCNC: 109 MG/DL — HIGH (ref 70–99)
HCT VFR BLD CALC: 29.2 % — LOW (ref 39–50)
HGB BLD-MCNC: 9.2 G/DL — LOW (ref 13–17)
MCHC RBC-ENTMCNC: 30.7 PG — SIGNIFICANT CHANGE UP (ref 27–34)
MCHC RBC-ENTMCNC: 31.5 GM/DL — LOW (ref 32–36)
MCV RBC AUTO: 97.3 FL — SIGNIFICANT CHANGE UP (ref 80–100)
NRBC # BLD: 4 /100 WBCS — HIGH (ref 0–0)
PLATELET # BLD AUTO: 187 K/UL — SIGNIFICANT CHANGE UP (ref 150–400)
POTASSIUM SERPL-MCNC: 4 MMOL/L — SIGNIFICANT CHANGE UP (ref 3.5–5.3)
POTASSIUM SERPL-SCNC: 4 MMOL/L — SIGNIFICANT CHANGE UP (ref 3.5–5.3)
RBC # BLD: 3 M/UL — LOW (ref 4.2–5.8)
RBC # FLD: 15.9 % — HIGH (ref 10.3–14.5)
SARS-COV-2 RNA SPEC QL NAA+PROBE: SIGNIFICANT CHANGE UP
SODIUM SERPL-SCNC: 147 MMOL/L — HIGH (ref 135–145)
WBC # BLD: 7.35 K/UL — SIGNIFICANT CHANGE UP (ref 3.8–10.5)
WBC # FLD AUTO: 7.35 K/UL — SIGNIFICANT CHANGE UP (ref 3.8–10.5)

## 2021-02-08 PROCEDURE — 88312 SPECIAL STAINS GROUP 1: CPT | Mod: 26

## 2021-02-08 PROCEDURE — 88305 TISSUE EXAM BY PATHOLOGIST: CPT | Mod: 26

## 2021-02-08 PROCEDURE — 99233 SBSQ HOSP IP/OBS HIGH 50: CPT

## 2021-02-08 RX ORDER — PANTOPRAZOLE SODIUM 20 MG/1
40 TABLET, DELAYED RELEASE ORAL
Refills: 0 | Status: DISCONTINUED | OUTPATIENT
Start: 2021-02-09 | End: 2021-02-09

## 2021-02-08 RX ORDER — ENOXAPARIN SODIUM 100 MG/ML
40 INJECTION SUBCUTANEOUS DAILY
Refills: 0 | Status: DISCONTINUED | OUTPATIENT
Start: 2021-02-09 | End: 2021-02-09

## 2021-02-08 RX ADMIN — Medication 3 CAPSULE(S): at 16:09

## 2021-02-08 RX ADMIN — DONEPEZIL HYDROCHLORIDE 5 MILLIGRAM(S): 10 TABLET, FILM COATED ORAL at 20:57

## 2021-02-08 RX ADMIN — SODIUM CHLORIDE 50 MILLILITER(S): 9 INJECTION, SOLUTION INTRAVENOUS at 15:18

## 2021-02-08 RX ADMIN — PANTOPRAZOLE SODIUM 40 MILLIGRAM(S): 20 TABLET, DELAYED RELEASE ORAL at 04:59

## 2021-02-08 RX ADMIN — Medication 500 MILLIGRAM(S): at 16:09

## 2021-02-08 RX ADMIN — Medication 1 TABLET(S): at 16:09

## 2021-02-08 RX ADMIN — OXYCODONE HYDROCHLORIDE 5 MILLIGRAM(S): 5 TABLET ORAL at 20:57

## 2021-02-08 RX ADMIN — Medication 975 MILLIGRAM(S): at 14:22

## 2021-02-08 RX ADMIN — Medication 2.5 MILLIGRAM(S): at 05:00

## 2021-02-08 RX ADMIN — TAMSULOSIN HYDROCHLORIDE 0.4 MILLIGRAM(S): 0.4 CAPSULE ORAL at 20:57

## 2021-02-08 RX ADMIN — SERTRALINE 12.5 MILLIGRAM(S): 25 TABLET, FILM COATED ORAL at 16:09

## 2021-02-08 RX ADMIN — Medication 975 MILLIGRAM(S): at 20:59

## 2021-02-08 RX ADMIN — Medication 975 MILLIGRAM(S): at 04:59

## 2021-02-08 RX ADMIN — Medication 1000 UNIT(S): at 16:08

## 2021-02-08 NOTE — PROGRESS NOTE ADULT - ASSESSMENT
86 year old male with a PMH of dementia, pancreatic insufficiency, urinary incontinence, bph, frequent falls, BIBEMS because of left hip pain, unable to stand after tripping on carpet and falling on his left side.  Patient with left femoral fracture. Patient is s/p L ORIF.  Hospital course c/b melena    #Symptomatic anemia  #Upper GI bleed  reported melena 2/5  H/H stable today  Transfuse to keep Hgb>7  Stool guiac positive  IV Protonix BID  DVT ppx on HOLD  GI consult appreciated- Upper endo today  Maintain active T+S    #Subtrochanteric fracture of left femur  - s/p left hip ORIF 2/1  - ortho following  - Pain control - w standing tylenol for improved pain control, continue PRN oxycodone and morphine for severe pain  - DVT ppx placed on hold given melena  - dispo plan for d/c [facility] when medically stable     #Dementia   - Continue donepezil, sertraline     #BPH  - continue tamsulosin    #Pancreatic insufficiency   - Continue creon, prednisone  ( As per wife, pt has been on Prednisone since 2011 - was prescribed for ?Chronic pancreatitis)    #DVT ppx  - hold Lovenox in setting of acute blood loss anemia     Dispo: PT recommending [facility], for discharge once medically stable   Case d/w WifeOfe using interpretor ID [id number]. planning for [facility]   Code Status: FULL CODE -reviewed with wife via interpretor  86 year old male with a PMH of dementia, pancreatic insufficiency, urinary incontinence, bph, frequent falls, BIBEMS because of left hip pain, unable to stand after tripping on carpet and falling on his left side.  Patient with left femoral fracture. Patient is s/p L ORIF.  Hospital course c/b melena    #Symptomatic anemia  #Upper GI bleed  reported melena 2/5  H/H stable today  Transfuse to keep Hgb>7  Stool guiac positive  IV Protonix BID  GI consult appreciated- s/p EGd today found to have duodenal ulcer, case discussed with Dr. Fernando. PPI daily and can resume any AC as needed  Maintain active T+S    #Subtrochanteric fracture of left femur  - s/p left hip ORIF 2/1  - ortho following  - Pain control - w standing tylenol for improved pain control, continue PRN oxycodone and morphine for severe pain  - DVT ppx placed on hold given melena  - dispo plan for d/c to [facility] when medically stable     #Dementia   - Continue donepezil, sertraline     #BPH  - continue tamsulosin    #Pancreatic insufficiency   - Continue creon, prednisone  ( As per wife, pt has been on Prednisone since 2011 - was prescribed for ?Chronic pancreatitis)    #DVT ppx  - will resume lovenox, per GI    Dispo: PT recommending [facility], for discharge once medically stable   Case d/w WifeOfe using interpretor ID [id number]. planning for [facility]   Code Status: FULL CODE -reviewed with wife via interpretor

## 2021-02-08 NOTE — PROGRESS NOTE ADULT - SUBJECTIVE AND OBJECTIVE BOX
POD #7 ORIF L hip/IM nail    Pt resting comfortably in bed awaiting EGD +dementia as per RN no acute events overnigbht    Vital Signs Last 24 Hrs  T(C): 36.9 (08 Feb 2021 12:37), Max: 36.9 (08 Feb 2021 12:37)  T(F): 98.4 (08 Feb 2021 12:37), Max: 98.4 (08 Feb 2021 12:37)  HR: 73 (08 Feb 2021 12:37) (73 - 92)  BP: 135/65 (08 Feb 2021 12:37) (126/57 - 153/71)  BP(mean): --  RR: 16 (08 Feb 2021 12:37) (16 - 18)  SpO2: 97% (08 Feb 2021 12:37) (93% - 97%)                          9.2    7.35  )-----------( 187      ( 08 Feb 2021 07:00 )             29.2     02-08    147<H>  |  114<H>  |  31<H>  ----------------------------<  109<H>  4.0   |  26  |  0.89    Ca    7.4<L>      08 Feb 2021 07:00    I&O's Detail    07 Feb 2021 07:01  -  08 Feb 2021 07:00  --------------------------------------------------------  IN:    Lactated Ringers: 200 mL    Oral Fluid: 200 mL  Total IN: 400 mL    OUT:  Total OUT: 0 mL    Total NET: 400 mL      PE: Left hip: incision clean staples intact. moderate serous drainage. abd pad placed thigh is mildly tense    LLE:: +dp pulse moves feet/ toes to stimulus  warm to touch

## 2021-02-08 NOTE — PROGRESS NOTE ADULT - ASSESSMENT
POD#7    -Ortho stable continue current management  -melena/?GI  for EGD today per gastro  -multiple medical isseus managed by medicine

## 2021-02-08 NOTE — PROGRESS NOTE ADULT - SUBJECTIVE AND OBJECTIVE BOX
Patient is a 86y old  Male who presents with a chief complaint of left hip pain s/p fall (07 Feb 2021 20:37)      Patient seen and examined at bedside.    ALLERGIES:  No Known Allergies    MEDICATIONS  (STANDING):  acetaminophen   Tablet .. 975 milliGRAM(s) Oral every 8 hours  ascorbic acid 500 milliGRAM(s) Oral daily  calcium carbonate 1250 mG  + Vitamin D (OsCal 500 + D) 1 Tablet(s) Oral daily  cholecalciferol 1000 Unit(s) Oral daily  donepezil 5 milliGRAM(s) Oral at bedtime  lactated ringers. 1000 milliLiter(s) (50 mL/Hr) IV Continuous <Continuous>  multivitamin 1 Tablet(s) Oral daily  pancrelipase Oral Capsule (CREON 24,000 Lipase Units) - Peds 3 Capsule(s) Oral three times a day with meals  pantoprazole  Injectable 40 milliGRAM(s) IV Push two times a day  predniSONE   Tablet 2.5 milliGRAM(s) Oral daily  sertraline 12.5 milliGRAM(s) Oral daily  tamsulosin 0.4 milliGRAM(s) Oral at bedtime    MEDICATIONS  (PRN):  morphine  - Injectable 4 milliGRAM(s) IV Push every 8 hours PRN Severe Pain (7 - 10)  oxyCODONE    IR 5 milliGRAM(s) Oral every 8 hours PRN Moderate Pain (4 - 6)  senna 2 Tablet(s) Oral at bedtime PRN Constipation    Vital Signs Last 24 Hrs  T(F): 98.1 (08 Feb 2021 08:23), Max: 98.2 (08 Feb 2021 04:51)  HR: 79 (08 Feb 2021 08:23) (79 - 92)  BP: 153/71 (08 Feb 2021 08:23) (126/57 - 153/71)  RR: 16 (08 Feb 2021 08:23) (16 - 18)  SpO2: 96% (08 Feb 2021 08:23) (93% - 96%)  I&O's Summary    07 Feb 2021 07:01  -  08 Feb 2021 07:00  --------------------------------------------------------  IN: 400 mL / OUT: 0 mL / NET: 400 mL      BMI (kg/m2): 25 (02-08-21 @ 08:23)  PHYSICAL EXAM:  General: NAD, A/O x 3  ENT: MMM  Neck: Supple, No JVD  Lungs: Clear to auscultation bilaterally  Cardio: RRR, S1/S2, No murmurs  Abdomen: Soft, Nontender, Nondistended; Bowel sounds present  Extremities: No calf tenderness, No pitting edema    LABS:                        9.2    7.35  )-----------( 187      ( 08 Feb 2021 07:00 )             29.2       02-08    147  |  114  |  31  ----------------------------<  109  4.0   |  26  |  0.89    Ca    7.4      08 Feb 2021 07:00       eGFR if Non African American: 77 mL/min/1.73M2 (02-08-21 @ 07:00)  eGFR if African American: 90 mL/min/1.73M2 (02-08-21 @ 07:00)                                     RADIOLOGY & ADDITIONAL TESTS:    Care Discussed with Consultants/Other Providers:

## 2021-02-09 ENCOUNTER — TRANSCRIPTION ENCOUNTER (OUTPATIENT)
Age: 86
End: 2021-02-09

## 2021-02-09 VITALS
RESPIRATION RATE: 18 BRPM | TEMPERATURE: 98 F | OXYGEN SATURATION: 96 % | HEART RATE: 94 BPM | DIASTOLIC BLOOD PRESSURE: 55 MMHG | SYSTOLIC BLOOD PRESSURE: 135 MMHG

## 2021-02-09 DIAGNOSIS — K92.2 GASTROINTESTINAL HEMORRHAGE, UNSPECIFIED: ICD-10-CM

## 2021-02-09 LAB
ALBUMIN SERPL ELPH-MCNC: 1.8 G/DL — LOW (ref 3.3–5)
ALP SERPL-CCNC: 73 U/L — SIGNIFICANT CHANGE UP (ref 40–120)
ALT FLD-CCNC: 33 U/L — SIGNIFICANT CHANGE UP (ref 10–45)
ANION GAP SERPL CALC-SCNC: 6 MMOL/L — SIGNIFICANT CHANGE UP (ref 5–17)
AST SERPL-CCNC: 43 U/L — HIGH (ref 10–40)
BASOPHILS # BLD AUTO: 0.02 K/UL — SIGNIFICANT CHANGE UP (ref 0–0.2)
BASOPHILS NFR BLD AUTO: 0.2 % — SIGNIFICANT CHANGE UP (ref 0–2)
BILIRUB SERPL-MCNC: 0.8 MG/DL — SIGNIFICANT CHANGE UP (ref 0.2–1.2)
BUN SERPL-MCNC: 22 MG/DL — SIGNIFICANT CHANGE UP (ref 7–23)
CALCIUM SERPL-MCNC: 7.6 MG/DL — LOW (ref 8.4–10.5)
CHLORIDE SERPL-SCNC: 110 MMOL/L — HIGH (ref 96–108)
CO2 SERPL-SCNC: 30 MMOL/L — SIGNIFICANT CHANGE UP (ref 22–31)
CREAT SERPL-MCNC: 0.8 MG/DL — SIGNIFICANT CHANGE UP (ref 0.5–1.3)
EOSINOPHIL # BLD AUTO: 0.12 K/UL — SIGNIFICANT CHANGE UP (ref 0–0.5)
EOSINOPHIL NFR BLD AUTO: 1.5 % — SIGNIFICANT CHANGE UP (ref 0–6)
GLUCOSE SERPL-MCNC: 94 MG/DL — SIGNIFICANT CHANGE UP (ref 70–99)
HCT VFR BLD CALC: 29.6 % — LOW (ref 39–50)
HGB BLD-MCNC: 9.7 G/DL — LOW (ref 13–17)
IMM GRANULOCYTES NFR BLD AUTO: 1 % — SIGNIFICANT CHANGE UP (ref 0–1.5)
LYMPHOCYTES # BLD AUTO: 0.65 K/UL — LOW (ref 1–3.3)
LYMPHOCYTES # BLD AUTO: 8 % — LOW (ref 13–44)
MCHC RBC-ENTMCNC: 31.7 PG — SIGNIFICANT CHANGE UP (ref 27–34)
MCHC RBC-ENTMCNC: 32.8 GM/DL — SIGNIFICANT CHANGE UP (ref 32–36)
MCV RBC AUTO: 96.7 FL — SIGNIFICANT CHANGE UP (ref 80–100)
MONOCYTES # BLD AUTO: 0.59 K/UL — SIGNIFICANT CHANGE UP (ref 0–0.9)
MONOCYTES NFR BLD AUTO: 7.2 % — SIGNIFICANT CHANGE UP (ref 2–14)
NEUTROPHILS # BLD AUTO: 6.71 K/UL — SIGNIFICANT CHANGE UP (ref 1.8–7.4)
NEUTROPHILS NFR BLD AUTO: 82.1 % — HIGH (ref 43–77)
NRBC # BLD: 0 /100 WBCS — SIGNIFICANT CHANGE UP (ref 0–0)
PLATELET # BLD AUTO: 239 K/UL — SIGNIFICANT CHANGE UP (ref 150–400)
POTASSIUM SERPL-MCNC: 3.8 MMOL/L — SIGNIFICANT CHANGE UP (ref 3.5–5.3)
POTASSIUM SERPL-SCNC: 3.8 MMOL/L — SIGNIFICANT CHANGE UP (ref 3.5–5.3)
PROT SERPL-MCNC: 4.6 G/DL — LOW (ref 6–8.3)
RBC # BLD: 3.06 M/UL — LOW (ref 4.2–5.8)
RBC # FLD: 15.9 % — HIGH (ref 10.3–14.5)
SODIUM SERPL-SCNC: 146 MMOL/L — HIGH (ref 135–145)
SURGICAL PATHOLOGY STUDY: SIGNIFICANT CHANGE UP
WBC # BLD: 8.17 K/UL — SIGNIFICANT CHANGE UP (ref 3.8–10.5)
WBC # FLD AUTO: 8.17 K/UL — SIGNIFICANT CHANGE UP (ref 3.8–10.5)

## 2021-02-09 PROCEDURE — 73502 X-RAY EXAM HIP UNI 2-3 VIEWS: CPT

## 2021-02-09 PROCEDURE — 88312 SPECIAL STAINS GROUP 1: CPT

## 2021-02-09 PROCEDURE — P9016: CPT

## 2021-02-09 PROCEDURE — 99239 HOSP IP/OBS DSCHRG MGMT >30: CPT

## 2021-02-09 PROCEDURE — C1769: CPT

## 2021-02-09 PROCEDURE — 73552 X-RAY EXAM OF FEMUR 2/>: CPT

## 2021-02-09 PROCEDURE — 85730 THROMBOPLASTIN TIME PARTIAL: CPT

## 2021-02-09 PROCEDURE — 82272 OCCULT BLD FECES 1-3 TESTS: CPT

## 2021-02-09 PROCEDURE — 85610 PROTHROMBIN TIME: CPT

## 2021-02-09 PROCEDURE — 99285 EMERGENCY DEPT VISIT HI MDM: CPT | Mod: 25

## 2021-02-09 PROCEDURE — 80048 BASIC METABOLIC PNL TOTAL CA: CPT

## 2021-02-09 PROCEDURE — 85025 COMPLETE CBC W/AUTO DIFF WBC: CPT

## 2021-02-09 PROCEDURE — C1713: CPT

## 2021-02-09 PROCEDURE — 36430 TRANSFUSION BLD/BLD COMPNT: CPT

## 2021-02-09 PROCEDURE — C1889: CPT

## 2021-02-09 PROCEDURE — 86769 SARS-COV-2 COVID-19 ANTIBODY: CPT

## 2021-02-09 PROCEDURE — 76000 FLUOROSCOPY <1 HR PHYS/QHP: CPT

## 2021-02-09 PROCEDURE — 93005 ELECTROCARDIOGRAM TRACING: CPT

## 2021-02-09 PROCEDURE — 36415 COLL VENOUS BLD VENIPUNCTURE: CPT

## 2021-02-09 PROCEDURE — 86901 BLOOD TYPING SEROLOGIC RH(D): CPT

## 2021-02-09 PROCEDURE — 86923 COMPATIBILITY TEST ELECTRIC: CPT

## 2021-02-09 PROCEDURE — U0005: CPT

## 2021-02-09 PROCEDURE — 94760 N-INVAS EAR/PLS OXIMETRY 1: CPT

## 2021-02-09 PROCEDURE — 80053 COMPREHEN METABOLIC PANEL: CPT

## 2021-02-09 PROCEDURE — 71045 X-RAY EXAM CHEST 1 VIEW: CPT

## 2021-02-09 PROCEDURE — 86900 BLOOD TYPING SEROLOGIC ABO: CPT

## 2021-02-09 PROCEDURE — U0003: CPT

## 2021-02-09 PROCEDURE — 88305 TISSUE EXAM BY PATHOLOGIST: CPT

## 2021-02-09 PROCEDURE — 83735 ASSAY OF MAGNESIUM: CPT

## 2021-02-09 PROCEDURE — 86850 RBC ANTIBODY SCREEN: CPT

## 2021-02-09 PROCEDURE — 70450 CT HEAD/BRAIN W/O DYE: CPT

## 2021-02-09 PROCEDURE — 85027 COMPLETE CBC AUTOMATED: CPT

## 2021-02-09 RX ORDER — MORPHINE SULFATE 50 MG/1
2 CAPSULE, EXTENDED RELEASE ORAL EVERY 8 HOURS
Refills: 0 | Status: DISCONTINUED | OUTPATIENT
Start: 2021-02-09 | End: 2021-02-09

## 2021-02-09 RX ORDER — ASCORBIC ACID 60 MG
1 TABLET,CHEWABLE ORAL
Qty: 0 | Refills: 0 | DISCHARGE
Start: 2021-02-09

## 2021-02-09 RX ORDER — OXYCODONE HYDROCHLORIDE 5 MG/1
1 TABLET ORAL
Qty: 0 | Refills: 0 | DISCHARGE
Start: 2021-02-09

## 2021-02-09 RX ORDER — PANTOPRAZOLE SODIUM 20 MG/1
1 TABLET, DELAYED RELEASE ORAL
Qty: 0 | Refills: 0 | DISCHARGE
Start: 2021-02-09

## 2021-02-09 RX ORDER — OXYCODONE HYDROCHLORIDE 5 MG/1
5 TABLET ORAL EVERY 8 HOURS
Refills: 0 | Status: DISCONTINUED | OUTPATIENT
Start: 2021-02-09 | End: 2021-02-09

## 2021-02-09 RX ORDER — MORPHINE SULFATE 50 MG/1
2 CAPSULE, EXTENDED RELEASE ORAL
Qty: 0 | Refills: 0 | DISCHARGE
Start: 2021-02-09

## 2021-02-09 RX ADMIN — ENOXAPARIN SODIUM 40 MILLIGRAM(S): 100 INJECTION SUBCUTANEOUS at 10:51

## 2021-02-09 RX ADMIN — Medication 500 MILLIGRAM(S): at 10:51

## 2021-02-09 RX ADMIN — Medication 2.5 MILLIGRAM(S): at 04:45

## 2021-02-09 RX ADMIN — PANTOPRAZOLE SODIUM 40 MILLIGRAM(S): 20 TABLET, DELAYED RELEASE ORAL at 04:45

## 2021-02-09 RX ADMIN — Medication 1000 UNIT(S): at 10:51

## 2021-02-09 RX ADMIN — OXYCODONE HYDROCHLORIDE 5 MILLIGRAM(S): 5 TABLET ORAL at 10:52

## 2021-02-09 RX ADMIN — Medication 3 CAPSULE(S): at 10:52

## 2021-02-09 RX ADMIN — Medication 3 CAPSULE(S): at 08:42

## 2021-02-09 RX ADMIN — Medication 975 MILLIGRAM(S): at 04:43

## 2021-02-09 RX ADMIN — SERTRALINE 12.5 MILLIGRAM(S): 25 TABLET, FILM COATED ORAL at 10:52

## 2021-02-09 RX ADMIN — Medication 1 TABLET(S): at 10:51

## 2021-02-09 RX ADMIN — Medication 1 TABLET(S): at 10:52

## 2021-02-09 NOTE — PROGRESS NOTE ADULT - SUBJECTIVE AND OBJECTIVE BOX
S/P ORIF left Hip IT fracture   POD#8    Patient was seen and examined by me today.  There were no acute events noted overnight.   Patient appears calm this am.  He is without complaints.       Vital Signs Last 24 Hrs  T(C): 36.8 (09 Feb 2021 04:37), Max: 36.9 (08 Feb 2021 12:37)  T(F): 98.2 (09 Feb 2021 04:37), Max: 98.4 (08 Feb 2021 12:37)  HR: 85 (09 Feb 2021 04:37) (73 - 85)  BP: 132/66 (09 Feb 2021 04:37) (132/66 - 154/79)  BP(mean): --  RR: 16 (09 Feb 2021 04:37) (16 - 16)  SpO2: 92% (09 Feb 2021 04:37) (92% - 97%)    PAST MEDICAL & SURGICAL HISTORY:  Dementia  Pancreatic insufficiency  Incontinence  Nocturia  BPH (benign prostatic hyperplasia)  Elevated blood sugar  secondary to steroids  Anxiety  Memory loss, short term  Poor historian  both patient and wife  Language barrier  pancreatic mass  benign 2011  Pancreatitis  2011  Basal cell carcinoma  left shoulder  S/P biopsy  pancreatic mass 2011, benign  S/P colonoscopy  2014, no polyps    PE:  Alert this am oriented X2  Neck:  w/ JVD   Lungs:  CTA   Cor:  S1S2  Abd:  soft, non tender +BS. w/o other episodes of melena noted   Ext:  Left hip incisions clean , staples in place, minimal amount of serous drainage at this time prob 2/2 to lovenox which had been stopped .    large area  of ecchymosis posterior thigh .   Thigh tense but improving, + neurovascular intact. ,  Foot warm, good CR  but small area of redness  on heel.    SCD s when in bed .   right foot also has beginning redness on heel .                             9.7    8.17  )-----------( 239      ( 09 Feb 2021 06:15 )             29.6   02-09    146<H>  |  110<H>  |  22  ----------------------------<  94  3.8   |  30  |  0.80    Ca    7.6<L>      09 Feb 2021 06:15    TPro  4.6<L>  /  Alb  1.8<L>  /  TBili  0.8  /  DBili  x   /  AST  43<H>  /  ALT  33  /  AlkPhos  73  02-09

## 2021-02-09 NOTE — PROGRESS NOTE ADULT - ASSESSMENT
86 year old male with a PMH of dementia, pancreatic insufficiency, urinary incontinence, bph, frequent falls, BIBEMS because of left hip pain, unable to stand after tripping on carpet and falling on his left side.  Patient with left femoral fracture. Patient is s/p L ORIF.  Hospital course c/b melena    #Symptomatic anemia  #Upper GI bleed  reported melena 2/5  H/H stable today  Transfuse to keep Hgb>7  Stool guiac positive  IV Protonix BID  GI consult appreciated- s/p EGd today found to have duodenal ulcer, case discussed with Dr. Fernando. PPI daily and can resume any AC as needed  Maintain active T+S    #Subtrochanteric fracture of left femur  - s/p left hip ORIF 2/1  - ortho following  - Pain control - w standing tylenol for improved pain control, continue PRN oxycodone and morphine for severe pain  - dispo plan for [facility] to [facility]  - DVT ppx    #Dementia   - Continue donepezil, sertraline     #BPH  - continue tamsulosin    #Pancreatic insufficiency   - Continue creon, prednisone  ( As per wife, pt has been on Prednisone since 2011 - was prescribed for ?Chronic pancreatitis)    #DVT ppx  - will resume lovenox, per GI    Dispo: PT recommending [facility], for discharge  Case d/w WifeOfe using interpretor ID [id number]. planning for [facility]   Code Status: FULL CODE -reviewed with wife via interpretor

## 2021-02-09 NOTE — DISCHARGE NOTE NURSING/CASE MANAGEMENT/SOCIAL WORK - PATIENT PORTAL LINK FT
You can access the FollowMyHealth Patient Portal offered by [facility] by registering at the following website: [email or web address]. By joining [facility]’s FollowMyHealth portal, you will also be able to view your health information using other applications (apps) compatible with our system.

## 2021-02-09 NOTE — DISCHARGE NOTE PROVIDER - NSDCMRMEDTOKEN_GEN_ALL_CORE_FT
acetaminophen 325 mg oral tablet: 2 tab(s) orally every 6 hours  ascorbic acid 500 mg oral tablet: 1 tab(s) orally once a day  Calcium 600+D oral tablet: 1 tab(s) orally once a day  cholecalciferol oral tablet: 1000 unit(s) orally once a day  Creon 24,000 units oral delayed release capsule: 3 cap(s) orally 3 times a day  donepezil 5 mg oral tablet: 1 tab(s) orally once a day (at bedtime)  morphine: 2 milligram(s) injectable every 8 hours  Multiple Vitamins oral tablet: 1 tab(s) orally once a day  oxyCODONE 5 mg oral tablet: 1 tab(s) orally every 8 hours, As needed, Moderate Pain (4 - 6)  pantoprazole 40 mg oral delayed release tablet: 1 tab(s) orally once a day (before a meal)  predniSONE 2.5 mg oral tablet: 1 tab(s) orally once a day  senna oral tablet: 2 tab(s) orally once a day (at bedtime)  sertraline 25 mg oral tablet: 1/2 tab(s) orally once a day  tamsulosin 0.4 mg oral capsule: 1 cap(s) orally once a day

## 2021-02-09 NOTE — PROGRESS NOTE ADULT - PROVIDER SPECIALTY LIST ADULT
MICU
Hospitalist
Hospitalist
Critical Care
Hospitalist
Orthopedics
Gastroenterology
Gastroenterology
Surgery

## 2021-02-09 NOTE — PROGRESS NOTE ADULT - PROBLEM SELECTOR PLAN 1
s/p upper scope from yesterday found to have duodenal ulcer   Continue PPI  H/H stable  May d/c from GI standpoint with outpatient follow up
trend CBC  PT WBAT LLE  hold lovenox for am GI procedure  monitor BM

## 2021-02-09 NOTE — PROGRESS NOTE ADULT - ATTENDING COMMENTS
Antolin Rousseau 
I have personally seen and examined patient on the above date. I discussed the case with NAIMA Douglass and I agree with the findings and plan as detailed per note above, which I have amended where appropriate.  87 yo M admitted for left femoral fx, s/p ORIF POD 3. Complicated by blood loss anemia. 2u pRBC ordered today. Follow post transfusion cbc.   Once medically stable will dc to [facility]
I have personally seen and examined patient on the above date. I discussed the case with KHOI Cruz and I agree with the findings and plan as detailed per note above, which I have amended where appropriate.  85 yo M s/p ORIF POD 1. Agitated overnight, likely delirium. re-orient patient. Haldol as needed if severely agitated/combative.   hg 8.8 today, blood loss vs dilution, will monitor  chronic prednisone user, 2.5mg daily
I have personally seen and examined patient on the above date.  I discussed the case with Odalis MCNAMARA and I agree with findings and plan as detailed per note above, which I have amended where appropriate.      Pt with Hip fracture s/p ORIF  1 episode of melena  GI evaluation  FOBT ordered  Monitor Hgb - transfuse to keep above 7  lovenox discontinued
I have personally seen and examined patient on the above date.  I discussed the case with Shirin MCNAMARA and I agree with findings and plan as detailed per note above, which I have amended where appropriate.      Pt with Hip fracture s/p ORIF  [facility] - EGD monday  Monitor Hgb - transfuse to keep above 7  lovenox discontinued  PPI BID
I have personally seen and examined patient on the above date.  I discussed the case with Shirin MCNAMARA and I agree with findings and plan as detailed per note above, which I have amended where appropriate.      Pt with Hip fracture s/p ORIF  [facility] - [facility] monday  Monitor Hgb - transfuse to keep above 7 - transfuse 2 units pRBC today  lovenox discontinued  PPI BID
I have personally seen and examined patient on the above date. I discussed the case with KHOI Cruz and I agree with the findings and plan as detailed per note above, which I have amended where appropriate.  Elderly M s/p ORIF POD2. Still agitated post anesthesia. PT as tolerated. Hg down-trending. Monitor and transfuse as needed to keep Hg>7.
Patient seen and examined.  Agree with assessment and plan as above.    No further melena.  No abdominal pain.  He is tolerating feeds.    VSS.  Abdomen soft, nontender BS+    PPI  Advance diet as tolerated  Discharge planning  May continue anticoagulation

## 2021-02-09 NOTE — PROGRESS NOTE ADULT - ASSESSMENT
86 year old male with hx of  86 year old male with hx of dementia, pancreatitis insufficiency , on steroids, is ortho stable s/p ORIF left hip .   Surgery was complicated by  a GI   bleed post op.   He was scoped by GI yesterday and found to have a duodenal ulcer.   Patient s H and H now stable .  He will be discharged to [facility]   today.       Plan:  WBAT Left leg   PT consult           Lovenox for DVT prophylaxis continue for at least 2 weeks.           Protonix daily as per GI            follow up with Dr Velazquez in 2 weeks

## 2021-02-09 NOTE — PROGRESS NOTE ADULT - NUTRITIONAL ASSESSMENT
This patient has been assessed with a concern for Malnutrition and has been determined to have a diagnosis/diagnoses of Severe protein-calorie malnutrition.    This patient is being managed with:   Diet Dysphagia 2 Mechanical Soft-Thin Liquids-  Supplement Feeding Modality:  Oral  Ensure Enlive Servings Per Day:  1       Frequency:  Three Times a day  Entered: Feb 7 2021  9:38AM    
This patient has been assessed with a concern for Malnutrition and has been determined to have a diagnosis/diagnoses of Severe protein-calorie malnutrition.    This patient is being managed with:   Diet NPO after Midnight-     NPO Start Date: 07-Feb-2021   NPO Start Time: 23:59  Entered: Feb 7 2021 10:33AM    Diet Dysphagia 2 Mechanical Soft-Thin Liquids-  Supplement Feeding Modality:  Oral  Ensure Enlive Servings Per Day:  1       Frequency:  Three Times a day  Entered: Feb 7 2021  9:38AM    
This patient has been assessed with a concern for Malnutrition and has been determined to have a diagnosis/diagnoses of Severe protein-calorie malnutrition.    This patient is being managed with:   Diet Dysphagia 2 Mechanical Soft-Thin Liquids-  Supplement Feeding Modality:  Oral  Ensure Enlive Servings Per Day:  1       Frequency:  Three Times a day  Entered: Feb 7 2021  9:38AM

## 2021-02-09 NOTE — DISCHARGE NOTE PROVIDER - HOSPITAL COURSE
HPI:  85 year old male with a PMH of dementia, pancreatic insufficiency, urinary incontinence, bph, frequent falls, BIBEMS because of left hip pain, unable to stand after tripping on carpet and falling on his left side. He did not LOC, did not hit head. He denies chest pain, dyspnea, cough, abd pain, fever.    x-ray with left femoral fracture.      Hospital Course:  s/p ORIF of hip  Found to have drop in hgb. Found to have duodenal ulcer on EGD.  PPI daily  DVT ppx post hip surgery  stable for DC to [facility]    Time spent: 35 mins

## 2021-02-09 NOTE — DISCHARGE NOTE PROVIDER - DETAILS OF MALNUTRITION DIAGNOSIS/DIAGNOSES
This patient has been assessed with a concern for Malnutrition and was treated during this hospitalization for the following Nutrition diagnosis/diagnoses:     -  02/07/2021: Severe protein-calorie malnutrition

## 2021-02-09 NOTE — PROGRESS NOTE ADULT - REASON FOR ADMISSION
left hip pain s/p fall

## 2021-02-09 NOTE — PROGRESS NOTE ADULT - SUBJECTIVE AND OBJECTIVE BOX
INTERVAL HPI/OVERNIGHT EVENTS:  HPI:    86 y/o male admitted s/p fall, + left femoral fx. GI following patient due to melena, s/p upper endoscopy from yesterday. Patient seen and examined at bedside. No further reports of melena today. Denies abdominal pain.      MEDICATIONS  (STANDING):  acetaminophen   Tablet .. 975 milliGRAM(s) Oral every 8 hours  ascorbic acid 500 milliGRAM(s) Oral daily  calcium carbonate 1250 mG  + Vitamin D (OsCal 500 + D) 1 Tablet(s) Oral daily  cholecalciferol 1000 Unit(s) Oral daily  donepezil 5 milliGRAM(s) Oral at bedtime  enoxaparin Injectable 40 milliGRAM(s) SubCutaneous daily  lactated ringers. 1000 milliLiter(s) (50 mL/Hr) IV Continuous <Continuous>  multivitamin 1 Tablet(s) Oral daily  pancrelipase Oral Capsule (CREON 24,000 Lipase Units) - Peds 3 Capsule(s) Oral three times a day with meals  pantoprazole    Tablet 40 milliGRAM(s) Oral before breakfast  predniSONE   Tablet 2.5 milliGRAM(s) Oral daily  sertraline 12.5 milliGRAM(s) Oral daily  tamsulosin 0.4 milliGRAM(s) Oral at bedtime    MEDICATIONS  (PRN):  morphine  - Injectable 2 milliGRAM(s) IV Push every 8 hours PRN Severe Pain (7 - 10)  oxyCODONE    IR 5 milliGRAM(s) Oral every 8 hours PRN Moderate Pain (4 - 6)  senna 2 Tablet(s) Oral at bedtime PRN Constipation      Allergies    No Known Allergies    Intolerances        PAST MEDICAL & SURGICAL HISTORY:  Dementia    Pancreatic insufficiency    Incontinence    Nocturia  x1-2    BPH (benign prostatic hyperplasia)    Elevated blood sugar  secondary to steroids    Anxiety    Memory loss, short term    Poor historian  both patient and wife    Language barrier    Pancreatic mass  benign     Pancreatitis      Basal cell carcinoma  left shoulder    S/P biopsy  pancreatic mass , benign    S/P colonoscopy  , no polyps	    PHYSICAL EXAM:   Vital Signs:  Vital Signs Last 24 Hrs  T(C): 36.9 (2021 10:45), Max: 36.9 (2021 12:37)  T(F): 98.4 (2021 10:45), Max: 98.4 (2021 12:37)  HR: 94 (2021 10:45) (73 - 94)  BP: 135/55 (2021 10:45) (132/66 - 154/79)  BP(mean): --  RR: 18 (2021 10:45) (16 - 18)  SpO2: 96% (2021 10:45) (92% - 97%)  Daily     Daily Weight in k (2021 04:37)I&O's Summary    2021 07:01  -  2021 07:00  --------------------------------------------------------  IN: 550 mL / OUT: 0 mL / NET: 550 mL    2021 07:01  -  2021 12:10  --------------------------------------------------------  IN: 120 mL / OUT: 0 mL / NET: 120 mL        GENERAL:  Appears stated age  HEENT:  NC/AT,  conjunctivae clear and pink, no thyromegaly,   CHEST:  Full & symmetric excursion, no increased effort, breath sounds clear  HEART:  Regular rhythm, S1, S2, no murmur  ABDOMEN:  Soft, non-tender, non-distended, normoactive bowel sounds,    EXTEREMITIES:  no edema  SKIN:  No rash/warm/dry  NEURO:  Alert, oriented,       LABS:                        9.7    8.17  )-----------( 239      ( 2021 06:15 )             29.6     02-09    146<H>  |  110<H>  |  22  ----------------------------<  94  3.8   |  30  |  0.80    Ca    7.6<L>      2021 06:15    TPro  4.6<L>  /  Alb  1.8<L>  /  TBili  0.8  /  DBili  x   /  AST  43<H>  /  ALT  33  /  AlkPhos  73  02-09        amylase   lipase  RADIOLOGY & ADDITIONAL TESTS:

## 2021-02-09 NOTE — DISCHARGE NOTE PROVIDER - CARE PROVIDER_API CALL
Reyes, John A (MD)  Internal Medicine  [address], [address]  [place] [address]  Phone: [phone]  Fax: [phone]  Follow Up Time:

## 2021-02-09 NOTE — DISCHARGE NOTE PROVIDER - NSDCCPCAREPLAN_GEN_ALL_CORE_FT
PRINCIPAL DISCHARGE DIAGNOSIS  Diagnosis: Closed fracture of left hip, initial encounter  Assessment and Plan of Treatment:       SECONDARY DISCHARGE DIAGNOSES  Diagnosis: Duodenal ulcer  Assessment and Plan of Treatment:     Diagnosis: Upper GI bleeding  Assessment and Plan of Treatment:

## 2021-02-09 NOTE — PROGRESS NOTE ADULT - SUBJECTIVE AND OBJECTIVE BOX
Patient is a 86y old  Male who presents with a chief complaint of left hip pain s/p fall (08 Feb 2021 14:26)      Patient seen and examined at bedside.  Hgb stable.  No acute complaints.    ALLERGIES:  No Known Allergies    MEDICATIONS  (STANDING):  acetaminophen   Tablet .. 975 milliGRAM(s) Oral every 8 hours  ascorbic acid 500 milliGRAM(s) Oral daily  calcium carbonate 1250 mG  + Vitamin D (OsCal 500 + D) 1 Tablet(s) Oral daily  cholecalciferol 1000 Unit(s) Oral daily  donepezil 5 milliGRAM(s) Oral at bedtime  enoxaparin Injectable 40 milliGRAM(s) SubCutaneous daily  lactated ringers. 1000 milliLiter(s) (50 mL/Hr) IV Continuous <Continuous>  multivitamin 1 Tablet(s) Oral daily  pancrelipase Oral Capsule (CREON 24,000 Lipase Units) - Peds 3 Capsule(s) Oral three times a day with meals  pantoprazole    Tablet 40 milliGRAM(s) Oral before breakfast  predniSONE   Tablet 2.5 milliGRAM(s) Oral daily  sertraline 12.5 milliGRAM(s) Oral daily  tamsulosin 0.4 milliGRAM(s) Oral at bedtime    MEDICATIONS  (PRN):  oxyCODONE    IR 5 milliGRAM(s) Oral every 8 hours PRN Moderate Pain (4 - 6)  senna 2 Tablet(s) Oral at bedtime PRN Constipation    Vital Signs Last 24 Hrs  T(F): 98.2 (09 Feb 2021 04:37), Max: 98.4 (08 Feb 2021 12:37)  HR: 85 (09 Feb 2021 04:37) (73 - 85)  BP: 132/66 (09 Feb 2021 04:37) (132/66 - 154/79)  RR: 16 (09 Feb 2021 04:37) (16 - 16)  SpO2: 92% (09 Feb 2021 04:37) (92% - 97%)  I&O's Summary    08 Feb 2021 07:01  -  09 Feb 2021 07:00  --------------------------------------------------------  IN: 550 mL / OUT: 0 mL / NET: 550 mL      BMI (kg/m2): 25 (02-08-21 @ 08:23)  PHYSICAL EXAM:  General: NAD, A/O x 3  ENT: MMM  Neck: Supple, No JVD  Lungs: Clear to auscultation bilaterally  Cardio: RRR, S1/S2, No murmurs  Abdomen: Soft, Nontender, Nondistended; Bowel sounds present  Extremities: No calf tenderness, No pitting edema    LABS:                        9.7    8.17  )-----------( 239      ( 09 Feb 2021 06:15 )             29.6       02-09    146  |  110  |  22  ----------------------------<  94  3.8   |  30  |  0.80    Ca    7.6      09 Feb 2021 06:15    TPro  4.6  /  Alb  1.8  /  TBili  0.8  /  DBili  x   /  AST  43  /  ALT  33  /  AlkPhos  73  02-09     eGFR if Non African American: 81 mL/min/1.73M2 (02-09-21 @ 06:15)  eGFR if African American: 94 mL/min/1.73M2 (02-09-21 @ 06:15)                                     RADIOLOGY & ADDITIONAL TESTS:    Care Discussed with Consultants/Other Providers:

## 2021-03-17 ENCOUNTER — APPOINTMENT (OUTPATIENT)
Dept: FAMILY MEDICINE | Facility: CLINIC | Age: 86
End: 2021-03-17
Payer: MEDICARE

## 2021-03-17 VITALS
OXYGEN SATURATION: 96 % | DIASTOLIC BLOOD PRESSURE: 64 MMHG | TEMPERATURE: 97 F | SYSTOLIC BLOOD PRESSURE: 100 MMHG | RESPIRATION RATE: 12 BRPM | HEART RATE: 81 BPM | HEIGHT: 62 IN

## 2021-03-17 DIAGNOSIS — Z00.00 ENCOUNTER FOR GENERAL ADULT MEDICAL EXAMINATION W/OUT ABNORMAL FINDINGS: ICD-10-CM

## 2021-03-17 DIAGNOSIS — R29.6 REPEATED FALLS: ICD-10-CM

## 2021-03-17 DIAGNOSIS — R32 UNSPECIFIED URINARY INCONTINENCE: ICD-10-CM

## 2021-03-17 DIAGNOSIS — R26.81 UNSTEADINESS ON FEET: ICD-10-CM

## 2021-03-17 PROCEDURE — 99072 ADDL SUPL MATRL&STAF TM PHE: CPT

## 2021-03-17 PROCEDURE — 99214 OFFICE O/P EST MOD 30 MIN: CPT

## 2021-03-17 RX ORDER — SENNOSIDES 8.6 MG/1
8.6 CAPSULE, GELATIN COATED ORAL
Refills: 0 | Status: ACTIVE | COMMUNITY
Start: 2021-03-17

## 2021-03-17 RX ORDER — SERTRALINE 25 MG/1
25 TABLET, FILM COATED ORAL
Qty: 90 | Refills: 0 | Status: DISCONTINUED | COMMUNITY
Start: 2018-07-06 | End: 2021-03-17

## 2021-03-17 RX ORDER — SILVER SULFADIAZINE 10 MG/G
1 CREAM TOPICAL
Refills: 0 | Status: ACTIVE | COMMUNITY
Start: 2021-03-17

## 2021-03-17 NOTE — PLAN
[FreeTextEntry1] : \par \par will order home draw to check labs at home for pt. Continue with wound care to heel pressure ulcers, educated on keep legs elevated off wound and dressing changes. Will continue with wound care nurse at home. Educated to keep patient hydrated and continue helping with meals. Discussed with patient to have patient take mirtazapine at night to help promote sleep. Will order at home PT for muscular deconditioning. Patient will return for care next week to follow up on blood work and for evaluation by Dr. Hernández. They will call with any questions in the mean time. Patient's wife verbalized understanding.

## 2021-03-17 NOTE — HISTORY OF PRESENT ILLNESS
[Post-hospitalization from ___ Hospital] : Post-hospitalization from [unfilled] Hospital [Admitted on: ___] : The patient was admitted on [unfilled] [Discharged on ___] : discharged on [unfilled] [Discharge Summary] : discharge summary [Pertinent Labs] : pertinent labs [Discharge Med List] : discharge medication list [Med Reconciliation] : medication reconciliation has been completed [Patient Contacted By: ____] : and contacted by [unfilled] [FreeTextEntry2] : Patient had fall and was in rehab. Has pressure ulcer on both heels, wound care nurse coming in to care for it. She thinks it has been healing well but says to have PCP check it out. He has not been sleeping at night, restless, keeping her up all night. He is taking mirtazpine in morning not at night. He only follows pancreatic doctor. They are trying to set up at home PT. He is incontinent of bladder, he is continent of bowel. Pt is eating and drinking well, good appetite. He does not eat as much as he used to, taking ensure, soft foots, coffee, soups, carbs, chicken etc. Pt denies any SOB, cough, chest pain, palpitations, N/V/D/C, fever, or chills. No other health concerns today.

## 2021-03-17 NOTE — PHYSICAL EXAM
[Normal Sclera/Conjunctiva] : normal sclera/conjunctiva [Normal] : normal rate, regular rhythm, normal S1 and S2 and no murmur heard [Soft] : abdomen soft [Non Tender] : non-tender [Non-distended] : non-distended [de-identified] : no infectious signs, see mobile uploads calcaneus pressure ulcers (left stage 3, right stage 2) [de-identified] : wheelchair bound

## 2021-03-17 NOTE — HEALTH RISK ASSESSMENT
[de-identified] : needs help with all adls, tries to participate not effective [de-identified] : needs help with all iadls

## 2021-03-22 ENCOUNTER — LABORATORY RESULT (OUTPATIENT)
Age: 86
End: 2021-03-22

## 2021-03-23 ENCOUNTER — NON-APPOINTMENT (OUTPATIENT)
Age: 86
End: 2021-03-23

## 2021-03-23 LAB
ALBUMIN SERPL ELPH-MCNC: 3 G/DL
ALP BLD-CCNC: 144 U/L
ALT SERPL-CCNC: 28 U/L
ANION GAP SERPL CALC-SCNC: 10 MMOL/L
AST SERPL-CCNC: 41 U/L
BASOPHILS # BLD AUTO: 0.04 K/UL
BASOPHILS NFR BLD AUTO: 0.4 %
BILIRUB SERPL-MCNC: 0.2 MG/DL
BUN SERPL-MCNC: 15 MG/DL
CALCIUM SERPL-MCNC: 8.8 MG/DL
CHLORIDE SERPL-SCNC: 104 MMOL/L
CHOLEST SERPL-MCNC: 178 MG/DL
CO2 SERPL-SCNC: 29 MMOL/L
CREAT SERPL-MCNC: 0.89 MG/DL
EOSINOPHIL # BLD AUTO: 0.07 K/UL
EOSINOPHIL NFR BLD AUTO: 0.8 %
ESTIMATED AVERAGE GLUCOSE: 108 MG/DL
FERRITIN SERPL-MCNC: 247 NG/ML
GLUCOSE SERPL-MCNC: 128 MG/DL
HBA1C MFR BLD HPLC: 5.4 %
HCT VFR BLD CALC: 38 %
HDLC SERPL-MCNC: 46 MG/DL
HGB BLD-MCNC: 11.4 G/DL
IMM GRANULOCYTES NFR BLD AUTO: 0.4 %
IRON SATN MFR SERPL: 14 %
IRON SERPL-MCNC: 27 UG/DL
LDLC SERPL CALC-MCNC: 112 MG/DL
LYMPHOCYTES # BLD AUTO: 1.98 K/UL
LYMPHOCYTES NFR BLD AUTO: 21.2 %
MAN DIFF?: NORMAL
MCHC RBC-ENTMCNC: 29.8 PG
MCHC RBC-ENTMCNC: 30 GM/DL
MCV RBC AUTO: 99.5 FL
MONOCYTES # BLD AUTO: 0.69 K/UL
MONOCYTES NFR BLD AUTO: 7.4 %
NEUTROPHILS # BLD AUTO: 6.5 K/UL
NEUTROPHILS NFR BLD AUTO: 69.8 %
NONHDLC SERPL-MCNC: 132 MG/DL
PLATELET # BLD AUTO: 485 K/UL
POTASSIUM SERPL-SCNC: 3.9 MMOL/L
PROT SERPL-MCNC: 6.2 G/DL
RBC # BLD: 3.82 M/UL
RBC # FLD: 14.1 %
SODIUM SERPL-SCNC: 143 MMOL/L
TIBC SERPL-MCNC: 188 UG/DL
TRIGL SERPL-MCNC: 97 MG/DL
TSH SERPL-ACNC: 2.4 UIU/ML
UIBC SERPL-MCNC: 161 UG/DL
VIT B12 SERPL-MCNC: 486 PG/ML
WBC # FLD AUTO: 9.32 K/UL

## 2021-03-31 ENCOUNTER — NON-APPOINTMENT (OUTPATIENT)
Age: 86
End: 2021-03-31

## 2021-03-31 ENCOUNTER — APPOINTMENT (OUTPATIENT)
Dept: FAMILY MEDICINE | Facility: CLINIC | Age: 86
End: 2021-03-31
Payer: MEDICARE

## 2021-03-31 ENCOUNTER — RESULT REVIEW (OUTPATIENT)
Age: 86
End: 2021-03-31

## 2021-03-31 ENCOUNTER — OUTPATIENT (OUTPATIENT)
Dept: OUTPATIENT SERVICES | Facility: HOSPITAL | Age: 86
LOS: 1 days | End: 2021-03-31
Payer: COMMERCIAL

## 2021-03-31 ENCOUNTER — APPOINTMENT (OUTPATIENT)
Dept: ULTRASOUND IMAGING | Facility: HOSPITAL | Age: 86
End: 2021-03-31
Payer: MEDICARE

## 2021-03-31 VITALS
OXYGEN SATURATION: 97 % | HEART RATE: 87 BPM | DIASTOLIC BLOOD PRESSURE: 70 MMHG | HEIGHT: 62 IN | SYSTOLIC BLOOD PRESSURE: 117 MMHG | BODY MASS INDEX: 21.53 KG/M2 | WEIGHT: 117 LBS | TEMPERATURE: 97.3 F

## 2021-03-31 DIAGNOSIS — F03.90 UNSPECIFIED DEMENTIA W/OUT BEHAVIORAL DISTURBANCE: ICD-10-CM

## 2021-03-31 DIAGNOSIS — D64.9 ANEMIA, UNSPECIFIED: ICD-10-CM

## 2021-03-31 DIAGNOSIS — Z98.890 OTHER SPECIFIED POSTPROCEDURAL STATES: Chronic | ICD-10-CM

## 2021-03-31 DIAGNOSIS — R31.0 GROSS HEMATURIA: ICD-10-CM

## 2021-03-31 DIAGNOSIS — R60.0 LOCALIZED EDEMA: ICD-10-CM

## 2021-03-31 DIAGNOSIS — H04.129 DRY EYE SYNDROME OF UNSPECIFIED LACRIMAL GLAND: ICD-10-CM

## 2021-03-31 DIAGNOSIS — R29.898 OTHER SYMPTOMS AND SIGNS INVOLVING THE MUSCULOSKELETAL SYSTEM: ICD-10-CM

## 2021-03-31 PROCEDURE — 99215 OFFICE O/P EST HI 40 MIN: CPT

## 2021-03-31 PROCEDURE — 93970 EXTREMITY STUDY: CPT | Mod: 26

## 2021-03-31 PROCEDURE — 99072 ADDL SUPL MATRL&STAF TM PHE: CPT

## 2021-03-31 PROCEDURE — 93970 EXTREMITY STUDY: CPT

## 2021-03-31 RX ORDER — MIRTAZAPINE 7.5 MG/1
7.5 TABLET, FILM COATED ORAL
Qty: 90 | Refills: 1 | Status: ACTIVE | COMMUNITY
Start: 2021-03-17 | End: 1900-01-01

## 2021-03-31 RX ORDER — DEXTRAN 70/HYPROMELLOSE 0.1%-0.3%
0.1-0.3 DROPS OPHTHALMIC (EYE)
Qty: 1 | Refills: 1 | Status: ACTIVE | COMMUNITY
Start: 2021-03-31 | End: 1900-01-01

## 2021-03-31 NOTE — HISTORY OF PRESENT ILLNESS
[FreeTextEntry1] : unilateral leg swelling [de-identified] : 3/31/21: Patient here today for evaluation of unilateral leg swelling as per aide. He came for hospital/rehab discharge last visit after fall causing ORIF L hip IT fracture, now wheelchair bound. Daughter reports that he has not been sleeping well at night so his wife is at home sleeping currently. She reports increased LLE, they have not been elevating legs. The wound care nurse has been coming in 2x/week and reports improvement in pressure ulcers. She reports they do have a f/u with urology. \par \par 3/17/21: Patient had fall and was in rehab. Has pressure ulcer on both heels, wound care nurse coming in to care for it. She thinks it has been healing well but says to have PCP check it out. He has not been sleeping at night, restless, keeping her up all night. He is taking mirtazpine in morning not at night. He only follows pancreatic doctor. They are trying to set up at home PT. He is incontinent of bladder, he is continent of bowel. Pt is eating and drinking well, good appetite. He does not eat as much as he used to, taking ensure, soft foots, coffee, soups, carbs, chicken etc. Pt denies any SOB, cough, chest pain, palpitations, N/V/D/C, fever, or chills. No other health concerns today.

## 2021-03-31 NOTE — PHYSICAL EXAM
[Normal Sclera/Conjunctiva] : normal sclera/conjunctiva [Normal] : normal rate, regular rhythm, normal S1 and S2 and no murmur heard [Soft] : abdomen soft [Non Tender] : non-tender [Non-distended] : non-distended [de-identified] : +2-3 edema to LLE [de-identified] : incisions from ORIF L leg C/D/I. no infectious signs, see mobile uploads calcaneus pressure ulcers - improved [de-identified] : wheelchair bound

## 2021-03-31 NOTE — PLAN
[FreeTextEntry1] : \par Unilateral leg swelling/pressure heels: Patient will go for venous us of legs to r/o dvt. Patient will also be referred to Dr. Whitmore vascular for further evaluation of vasculature d/t location of pressure ulcers. Advised patient has to be repositioned every two hours to prevent pressure ulcers, needs to elevate legs to prevent swelling. Daughter verbalized understanding\par \par Melena hx/duodenal ulcer: advised daughter per hospital notes patient had melena that was resolved, just having some blood from hemorrhoids. He was to f/u with Dr. Fernando with duodenal ulcer they found while he was in the hospital. Referral placed, daughter verbalized understanding.\par \par Urology: will refer back to urology d/t hematuria, patient never followed up with urology. Referral to Dr. Reid placed. Daughter verbalized understanding.\par \par Dry eyes: artificial tears rx sent to pharmacy, daughter verbalized understanding\par \par Mirtazapine rx sent to pharmacy, patient still not sleeping during day unclear if patient has been taking nightly before bed\par \par \par

## 2021-03-31 NOTE — HEALTH RISK ASSESSMENT
[de-identified] : needs help with all adls, tries to participate not effective [de-identified] : needs help with all iadls

## 2021-04-01 ENCOUNTER — NON-APPOINTMENT (OUTPATIENT)
Age: 86
End: 2021-04-01

## 2021-04-02 ENCOUNTER — APPOINTMENT (OUTPATIENT)
Dept: FAMILY MEDICINE | Facility: CLINIC | Age: 86
End: 2021-04-02

## 2021-04-07 ENCOUNTER — APPOINTMENT (OUTPATIENT)
Dept: FAMILY MEDICINE | Facility: CLINIC | Age: 86
End: 2021-04-07

## 2021-04-21 ENCOUNTER — APPOINTMENT (OUTPATIENT)
Dept: GASTROENTEROLOGY | Facility: CLINIC | Age: 86
End: 2021-04-21
Payer: MEDICARE

## 2021-04-21 VITALS
SYSTOLIC BLOOD PRESSURE: 110 MMHG | OXYGEN SATURATION: 97 % | DIASTOLIC BLOOD PRESSURE: 70 MMHG | HEIGHT: 62 IN | TEMPERATURE: 98 F | HEART RATE: 84 BPM

## 2021-04-21 DIAGNOSIS — K26.9 DUODENAL ULCER, UNSPECIFIED AS ACUTE OR CHRONIC, W/OUT HEMORRHAGE OR PERFORATION: ICD-10-CM

## 2021-04-21 DIAGNOSIS — K59.09 OTHER CONSTIPATION: ICD-10-CM

## 2021-04-21 PROCEDURE — 99072 ADDL SUPL MATRL&STAF TM PHE: CPT

## 2021-04-21 PROCEDURE — 99214 OFFICE O/P EST MOD 30 MIN: CPT

## 2021-04-21 PROCEDURE — 99204 OFFICE O/P NEW MOD 45 MIN: CPT

## 2021-04-21 RX ORDER — PREDNISONE 2.5 MG/1
2.5 TABLET ORAL
Qty: 30 | Refills: 0 | Status: DISCONTINUED | COMMUNITY
Start: 2018-04-10 | End: 2021-04-21

## 2021-04-21 RX ORDER — PANCRELIPASE 120000; 24000; 76000 [USP'U]/1; [USP'U]/1; [USP'U]/1
24000-76000 CAPSULE, DELAYED RELEASE PELLETS ORAL
Qty: 810 | Refills: 0 | Status: DISCONTINUED | COMMUNITY
Start: 2018-07-02 | End: 2021-04-21

## 2021-04-21 NOTE — ASSESSMENT
[FreeTextEntry1] : My impression is that of an elderly male with multiple medical problems including advanced Alzheimer's and the sequelae of a prolonged hospitalization and rehabilitation.  During his hospitalization he was found to have a healing duodenal ulcer but he is not currently on any cytoprotection.  I recommended he begin famotidine twice daily.  He is to use MiraLAX to achieve daily bowel movement and follow-up with Dr. Will regarding his pancreatic disease.  He can get serial CBCs his primary doctor and follow-up, as needed.

## 2021-04-21 NOTE — HISTORY OF PRESENT ILLNESS
[de-identified] : The history regarding this patient visit was obtained by interviewing the patient's daughter and reviewing both hospital and outpatient records.\par \par The patient was hospitalized in February for orthopedic reasons after having a fall.  During his hospitalization he had an acute drop in blood count with apparent melena.  She was transfused and upper endoscopy demonstrated a healing gastric ulcer.  Repeat blood count showed good response as an outpatient.  He was tested in rehabilitation and, more recently by a home visit.  He has a history of rectal bleeding with known hemorrhoids in the past.  Per records he last had a colonoscopy in 2014 that was reportedly okay.\par \par Additionally, the daughter and records indicate that he has "acute and recurrent pancreatitis for which he is followed at [facility] by Dr. Will.  He seems to have multiple ERCPs and is on pancreatic enzyme supplementation.  He has a follow-up appointment booked in May.\par \par The patient has advanced Alzheimer's and is minimally communicative.  He struggling with multiple issues having to do with a long rehab stent including skin ulcerations and mobility limitations.\par \par Currently the patient is eating food at a healthy weight and has occasional constipation but moves his bowels fairly regularly.

## 2021-04-21 NOTE — PHYSICAL EXAM
[Outer Ear] : the ears and nose were normal in appearance [Neck Appearance] : the appearance of the neck was normal [] : no respiratory distress [Bowel Sounds] : normal bowel sounds [Heart Rate And Rhythm] : heart rate was normal and rhythm regular [Abdomen Soft] : soft [Abdomen Tenderness] : non-tender [Cervical Lymph Nodes Enlarged Posterior Bilaterally] : posterior cervical [Cervical Lymph Nodes Enlarged Anterior Bilaterally] : anterior cervical [FreeTextEntry1] : Wheelchair but dependent

## 2021-04-21 NOTE — REVIEW OF SYSTEMS
[Feeling Poorly] : feeling poorly [Feeling Tired] : feeling tired [Eye Pain] : no eye pain [Loss Of Hearing] : hearing loss [Wheezing] : no wheezing [As Noted in HPI] : as noted in HPI [Arthralgias] : arthralgias [Joint Pain] : joint pain [Limb Pain] : limb pain [Limb Swelling] : limb swelling [Confused] : confusion [Muscle Weakness] : muscle weakness [Negative] : Cardiovascular

## 2021-05-05 ENCOUNTER — RX RENEWAL (OUTPATIENT)
Age: 86
End: 2021-05-05

## 2021-05-12 ENCOUNTER — NON-APPOINTMENT (OUTPATIENT)
Age: 86
End: 2021-05-12

## 2021-05-12 ENCOUNTER — EMERGENCY (EMERGENCY)
Facility: HOSPITAL | Age: 86
LOS: 1 days | Discharge: HOME | End: 2021-05-12
Attending: EMERGENCY MEDICINE | Admitting: EMERGENCY MEDICINE
Payer: COMMERCIAL

## 2021-05-12 VITALS
DIASTOLIC BLOOD PRESSURE: 74 MMHG | TEMPERATURE: 98 F | OXYGEN SATURATION: 99 % | RESPIRATION RATE: 17 BRPM | HEIGHT: 62 IN | WEIGHT: 125 LBS | HEART RATE: 79 BPM | SYSTOLIC BLOOD PRESSURE: 124 MMHG

## 2021-05-12 VITALS
TEMPERATURE: 98 F | HEART RATE: 86 BPM | RESPIRATION RATE: 18 BRPM | OXYGEN SATURATION: 97 % | DIASTOLIC BLOOD PRESSURE: 85 MMHG | SYSTOLIC BLOOD PRESSURE: 163 MMHG

## 2021-05-12 DIAGNOSIS — Z98.890 OTHER SPECIFIED POSTPROCEDURAL STATES: Chronic | ICD-10-CM

## 2021-05-12 LAB
ALBUMIN SERPL ELPH-MCNC: 2.3 G/DL — LOW (ref 3.3–5)
ALP SERPL-CCNC: 110 U/L — SIGNIFICANT CHANGE UP (ref 40–120)
ALT FLD-CCNC: 25 U/L — SIGNIFICANT CHANGE UP (ref 10–45)
ANION GAP SERPL CALC-SCNC: 5 MMOL/L — SIGNIFICANT CHANGE UP (ref 5–17)
AST SERPL-CCNC: 24 U/L — SIGNIFICANT CHANGE UP (ref 10–40)
BASOPHILS # BLD AUTO: 0.04 K/UL — SIGNIFICANT CHANGE UP (ref 0–0.2)
BASOPHILS NFR BLD AUTO: 0.4 % — SIGNIFICANT CHANGE UP (ref 0–2)
BILIRUB SERPL-MCNC: 0.3 MG/DL — SIGNIFICANT CHANGE UP (ref 0.2–1.2)
BUN SERPL-MCNC: 22 MG/DL — SIGNIFICANT CHANGE UP (ref 7–23)
CALCIUM SERPL-MCNC: 8.7 MG/DL — SIGNIFICANT CHANGE UP (ref 8.4–10.5)
CHLORIDE SERPL-SCNC: 110 MMOL/L — HIGH (ref 96–108)
CO2 SERPL-SCNC: 30 MMOL/L — SIGNIFICANT CHANGE UP (ref 22–31)
CREAT SERPL-MCNC: 1.06 MG/DL — SIGNIFICANT CHANGE UP (ref 0.5–1.3)
EOSINOPHIL # BLD AUTO: 0.24 K/UL — SIGNIFICANT CHANGE UP (ref 0–0.5)
EOSINOPHIL NFR BLD AUTO: 2.4 % — SIGNIFICANT CHANGE UP (ref 0–6)
GLUCOSE SERPL-MCNC: 99 MG/DL — SIGNIFICANT CHANGE UP (ref 70–99)
HCT VFR BLD CALC: 37 % — LOW (ref 39–50)
HGB BLD-MCNC: 11.6 G/DL — LOW (ref 13–17)
IMM GRANULOCYTES NFR BLD AUTO: 0.3 % — SIGNIFICANT CHANGE UP (ref 0–1.5)
LYMPHOCYTES # BLD AUTO: 3.21 K/UL — SIGNIFICANT CHANGE UP (ref 1–3.3)
LYMPHOCYTES # BLD AUTO: 32.6 % — SIGNIFICANT CHANGE UP (ref 13–44)
MCHC RBC-ENTMCNC: 28.7 PG — SIGNIFICANT CHANGE UP (ref 27–34)
MCHC RBC-ENTMCNC: 31.4 GM/DL — LOW (ref 32–36)
MCV RBC AUTO: 91.6 FL — SIGNIFICANT CHANGE UP (ref 80–100)
MONOCYTES # BLD AUTO: 0.83 K/UL — SIGNIFICANT CHANGE UP (ref 0–0.9)
MONOCYTES NFR BLD AUTO: 8.4 % — SIGNIFICANT CHANGE UP (ref 2–14)
NEUTROPHILS # BLD AUTO: 5.49 K/UL — SIGNIFICANT CHANGE UP (ref 1.8–7.4)
NEUTROPHILS NFR BLD AUTO: 55.9 % — SIGNIFICANT CHANGE UP (ref 43–77)
NRBC # BLD: 0 /100 WBCS — SIGNIFICANT CHANGE UP (ref 0–0)
PLATELET # BLD AUTO: 349 K/UL — SIGNIFICANT CHANGE UP (ref 150–400)
POTASSIUM SERPL-MCNC: 3.8 MMOL/L — SIGNIFICANT CHANGE UP (ref 3.5–5.3)
POTASSIUM SERPL-SCNC: 3.8 MMOL/L — SIGNIFICANT CHANGE UP (ref 3.5–5.3)
PROT SERPL-MCNC: 7 G/DL — SIGNIFICANT CHANGE UP (ref 6–8.3)
RBC # BLD: 4.04 M/UL — LOW (ref 4.2–5.8)
RBC # FLD: 13.8 % — SIGNIFICANT CHANGE UP (ref 10.3–14.5)
SODIUM SERPL-SCNC: 145 MMOL/L — SIGNIFICANT CHANGE UP (ref 135–145)
WBC # BLD: 9.84 K/UL — SIGNIFICANT CHANGE UP (ref 3.8–10.5)
WBC # FLD AUTO: 9.84 K/UL — SIGNIFICANT CHANGE UP (ref 3.8–10.5)

## 2021-05-12 PROCEDURE — 99284 EMERGENCY DEPT VISIT MOD MDM: CPT

## 2021-05-12 PROCEDURE — 80053 COMPREHEN METABOLIC PANEL: CPT

## 2021-05-12 PROCEDURE — 99283 EMERGENCY DEPT VISIT LOW MDM: CPT

## 2021-05-12 PROCEDURE — 36415 COLL VENOUS BLD VENIPUNCTURE: CPT

## 2021-05-12 PROCEDURE — 85025 COMPLETE CBC W/AUTO DIFF WBC: CPT

## 2021-05-12 NOTE — ED PROVIDER NOTE - CPE EDP MUSC NORM
.  .                                                      At [facility], one important tool we use to improve our patient services is our Patient Survey.  Following your visit you may receive our survey in the mail or by email.    Please take the time to complete the survey.    If your visit with us was great, we want to hear about it.    If we can improve, please let us know how.              normal...

## 2021-05-12 NOTE — ED ADULT NURSE NOTE - OBJECTIVE STATEMENT
Patient endorses diarrhea x 2 days. patient denies fever, no cough, no SOB, no nausea, no vomiting, no dizziness, no dysuria, no hematuria, no chills.

## 2021-05-12 NOTE — ED PROVIDER NOTE - ATTENDING CONTRIBUTION TO CARE
I personally evaluated the patient. I reviewed the Resident’s or Physician Assistant’s note (as assigned above), and agree with the findings and plan except as documented in my note.  86 yo male, hx pancreatic  insufficiency, dementia, comes to the ED co diarrhea since yesterday. As per wife patient had 8 episodes of loose stool yesterday. Denies any blood in the stools, Denies any abd pain, n/v, fevers, chills, weakness or any other symptoms.    check labs, c diff and re-eval  Asking for wound check ulcer left heel, gets wound care at home 3 times a week.      labs ok,  unable to leave stool specimen as no additional episodes of diarrhea.      Abd soft non tender   Instructed to follow up

## 2021-05-12 NOTE — ED ADULT NURSE NOTE - NSIMPLEMENTINTERV_GEN_ALL_ED
Implemented All Fall with Harm Risk Interventions:  [place] to call system. Call bell, personal items and telephone within reach. Instruct patient to call for assistance. Room bathroom lighting operational. Non-slip footwear when patient is off stretcher. Physically safe environment: no spills, clutter or unnecessary equipment. Stretcher in lowest position, wheels locked, appropriate side rails in place. Provide visual cue, wrist band, yellow gown, etc. Monitor gait and stability. Monitor for mental status changes and reorient to person, place, and time. Review medications for side effects contributing to fall risk. Reinforce activity limits and safety measures with patient and family. Provide visual clues: red socks.

## 2021-05-12 NOTE — ED PROVIDER NOTE - NSFOLLOWUPINSTRUCTIONS_ED_ALL_ED_FT
Follow up with your primary physician for a  post hospital visit within 48 hours, taking all results from the ER to be reviewed.  If able to get a stool sample at home you can return it to the ED to be sent.    Ider diet as reviewed.    If any recurrent, persistent diarrhea, any abdominal pain, nausea, vomiting, fevers, chills, urinary complaints, any worsening, concerning or new signs or symposium return to the ER.     Continue your wound care at home, if any signs of infections, redness, drainage , pain, fevers, chills, any worsening, concerning signs or symptoms return to the ER.        Acute Diarrhea    WHAT YOU NEED TO KNOW:    What do I need to know about acute diarrhea?Acute diarrhea starts quickly and lasts a short time, usually 1 to 3 days. It can last up to 2 weeks.     What causes acute diarrhea?   •Bacteria, such as E coli or salmonella      •Viruses, such as rotavirus and norovirus      •A parasite, such as giardia      •Medicines, such as laxatives, antacids, or antibiotics      •An allergy to lactose, soy, or gluten      •Eating food or drinking water that contains germs      •Medical treatments, such as chemotherapy or radiation      What other signs and symptoms might I have with acute diarrhea? You may have 3 or more episodes of diarrhea. It may be hard to control your diarrhea. You may also have any of the following:   •Fever and chills      •Headache or abdominal pain      •Nausea and vomiting      •Symptoms of dehydration such as thirst, decreased urination, dry skin, sunken eyes, or fast, pounding heartbeat      What does my healthcare provider need to know about my acute diarrhea? Your healthcare provider will ask about your symptoms. He or she will ask what you have recently eaten and if you have traveled to other countries. Tell the provider what medicines you use or if you have been around anyone who is sick. Your healthcare provider may check you for signs of dehydration. He or she may also test your blood and bowel movement for infection.     How is acute diarrhea treated? Acute diarrhea usually gets better without treatment. You may need any of the following if your diarrhea is severe or lasts longer than a few days:   •Diarrhea medicine is an over-the-counter medicine that helps slow or stop your diarrhea. Do not take this medicine unless your healthcare provider says it is okay.       •Antibiotics may be given to help treat an infection caused by bacteria.       •Antiparasitics may be given to treat an infection caused by parasites.       How can acute diarrhea be managed?   •Drink liquids as directed. Liquids will help prevent dehydration caused by diarrhea. Ask your healthcare provider how much liquid to drink each day and which liquids are best for you. You may need to drink an oral rehydration solution (ORS). An ORS has the right amounts of water, salts, and sugar you need to replace body fluids. You can buy an ORS at most grocery stores and pharmacies.       •Eat foods that are easy to digest. Examples include rice, lentils, cereal, bananas, potatoes, and bread. It also includes some fruits (bananas, melon), well-cooked vegetables, and lean meats. Do not eat foods high in fiber, fat, or sugar. Also, do not drink alcohol until your diarrhea is gone.       How can acute diarrhea be prevented?   •Wash your hands often. Use soap and water. Wash your hands before you eat or prepare food. Also wash your hands after you use the bathroom. Use an alcohol-based hand gel when soap and water are not available.  Handwashing           •Keep bathroom surfaces clean. This helps prevent the spread of germs that cause acute diarrhea.       •Wash fruits and vegetables well before you eat them. This can help remove germs that cause diarrhea. If possible, remove the skin from fruits and vegetables, or cook them well before you eat them.       •Cook meat and poultry as directed. Meat includes beef and pork. Poultry includes chicken, turkey, and duck.?Cook ground meat to 160°F.       ?Cook ground poultry, whole poultry, or cuts of poultry to at least 165°F. Remove the poultry from heat. Let it stand for 3 minutes before you eat it.       ?Cook whole cuts of meat other than poultry to at least 145°F. Remove the meat from heat. Let it stand for 3 minutes before you eat it.       •Wash dishes that have touched raw meat or poultry with hot water and soap. This includes cutting boards, utensils, dishes, and serving containers.       •Place raw or cooked meat in the refrigerator as soon as possible. Bacteria can grow in meat that is left at room temperature too long.       •Do not eat raw or undercooked oysters, clams, or mussels. These foods may be contaminated and cause infection.       •Drink only filtered or treated water when you travel. Do not put ice in your drinks. Drink bottled water whenever possible.       When should I seek immediate care?   •You feel confused.       •Your heartbeat is faster than usual.       •Your eyes look deeply sunken, or you have no tears when you cry.       •You urinate less than usual, or your urine is dark yellow.       •You have blood or mucus in your bowel movements.      •You have severe abdominal pain.       •You are unable to drink any liquids.       When should I contact my healthcare provider?   •Your symptoms do not get better with treatment.       •You have a fever higher than 101.3°F (38.5°C).       •You have trouble eating and drinking because you are vomiting.       •Your diarrhea does not get better in 7 days.       •You have questions or concerns about your condition or care.

## 2021-05-12 NOTE — ED PROVIDER NOTE - PATIENT PORTAL LINK FT
You can access the FollowMyHealth Patient Portal offered by [facility] by registering at the following website: [email or web address]. By joining [employer]’s FollowMyHealth portal, you will also be able to view your health information using other applications (apps) compatible with our system.

## 2021-05-12 NOTE — ED PROVIDER NOTE - OBJECTIVE STATEMENT
86 yo male, hx pancreatic  insufficiency, dementia, comes to the ED co diarrhea since yesterday. As per wife patient had 8 episodes of loose stool yesterday. Denies any blood in the stools, Denies any abd pain, n/v, fevers, chills, weakness or any other symptoms.  Denies recent AB use. 88 yo male, hx pancreatic  insufficiency, dementia, comes to the ED co diarrhea since yesterday. As per wife patient had 8 episodes of loose stool yesterday. Denies any blood in the stools, Denies any abd pain, n/v, fevers, chills, weakness or any other symptoms.  Denies recent AB use.  Asking for wound check ulcer left heel, gets wound care at home 3 times a week.

## 2021-05-12 NOTE — ED ADULT NURSE NOTE - NS ED NOTE ABUSE RESPONSE YN
[Normal Breath Sounds] : Normal breath sounds [2+] : right 2+ [No HSM] : no hepatosplenomegaly [No Rash or Lesion] : No rash or lesion [Alert] : alert [Oriented to Person] : oriented to person [Oriented to Place] : oriented to place [Oriented to Time] : oriented to time [Calm] : calm [JVD] : no jugular venous distention  [Right Carotid Bruit] : no bruit heard over the right carotid [Left Carotid Bruit] : no bruit heard over the left carotid [Varicose Veins Of Lower Extremities] : not present [Ankle Swelling (On Exam)] : not present [] : not present [Abdomen Masses] : No abdominal masses [Tender] : was nontender [Stool Sample Taken] : No stool obtained  on rectal exam [de-identified] : nad [FreeTextEntry1] : rue mild  edema\par no pain no cellulitis\par dec rom to 45 deg rt shoulder  [de-identified] : wnl [de-identified] : wnl [de-identified] : wnl [de-identified] : Wiliam Cranial nerves 2-12 wiliam grossly intact [de-identified] : cooperative Yes

## 2021-05-12 NOTE — ED PROVIDER NOTE - CLINICAL SUMMARY MEDICAL DECISION MAKING FREE TEXT BOX
88 yo male, hx pancreatic  insufficiency, dementia, comes to the ED co diarrhea since yesterday. As per wife patient had 8 episodes of loose stool yesterday. Denies any blood in the stools, Denies any abd pain, n/v, fevers, chills, weakness or any other symptoms.    check labs, c diff and re-eval 86 yo male, hx pancreatic  insufficiency, dementia, comes to the ED co diarrhea since yesterday. As per wife patient had 8 episodes of loose stool yesterday. Denies any blood in the stools, Denies any abd pain, n/v, fevers, chills, weakness or any other symptoms.    check labs, c diff and re-eval  Asking for wound check ulcer left heel, gets wound care at home 3 times a week.      labs ok,  unable to leave stool specimen as no additional episodes of diarrhea.   Will dc home as dw attending carolyn with pcp follow up.

## 2021-05-19 ENCOUNTER — EMERGENCY (EMERGENCY)
Facility: HOSPITAL | Age: 86
LOS: 1 days | Discharge: HOME | End: 2021-05-19
Attending: EMERGENCY MEDICINE | Admitting: EMERGENCY MEDICINE
Payer: COMMERCIAL

## 2021-05-19 ENCOUNTER — APPOINTMENT (OUTPATIENT)
Dept: FAMILY MEDICINE | Facility: CLINIC | Age: 86
End: 2021-05-19
Payer: MEDICARE

## 2021-05-19 VITALS
WEIGHT: 123.46 LBS | HEART RATE: 91 BPM | SYSTOLIC BLOOD PRESSURE: 127 MMHG | TEMPERATURE: 98 F | OXYGEN SATURATION: 98 % | HEIGHT: 62 IN | DIASTOLIC BLOOD PRESSURE: 73 MMHG | RESPIRATION RATE: 16 BRPM

## 2021-05-19 VITALS
OXYGEN SATURATION: 97 % | HEART RATE: 68 BPM | DIASTOLIC BLOOD PRESSURE: 73 MMHG | TEMPERATURE: 97.7 F | SYSTOLIC BLOOD PRESSURE: 108 MMHG

## 2021-05-19 VITALS
SYSTOLIC BLOOD PRESSURE: 140 MMHG | DIASTOLIC BLOOD PRESSURE: 64 MMHG | OXYGEN SATURATION: 98 % | HEART RATE: 90 BPM | RESPIRATION RATE: 16 BRPM

## 2021-05-19 DIAGNOSIS — L89.629 PRESSURE ULCER OF RIGHT HEEL, UNSPECIFIED STAGE: ICD-10-CM

## 2021-05-19 DIAGNOSIS — M79.89 OTHER SPECIFIED SOFT TISSUE DISORDERS: ICD-10-CM

## 2021-05-19 DIAGNOSIS — L89.619 PRESSURE ULCER OF RIGHT HEEL, UNSPECIFIED STAGE: ICD-10-CM

## 2021-05-19 DIAGNOSIS — Z98.890 OTHER SPECIFIED POSTPROCEDURAL STATES: Chronic | ICD-10-CM

## 2021-05-19 PROCEDURE — 99214 OFFICE O/P EST MOD 30 MIN: CPT

## 2021-05-19 PROCEDURE — 93971 EXTREMITY STUDY: CPT

## 2021-05-19 PROCEDURE — 99284 EMERGENCY DEPT VISIT MOD MDM: CPT

## 2021-05-19 PROCEDURE — 99284 EMERGENCY DEPT VISIT MOD MDM: CPT | Mod: 25

## 2021-05-19 PROCEDURE — 93971 EXTREMITY STUDY: CPT | Mod: 26,LT

## 2021-05-19 RX ORDER — COLLAGENASE SANTYL 250 [ARB'U]/G
250 OINTMENT TOPICAL DAILY
Qty: 1 | Refills: 1 | Status: ACTIVE | COMMUNITY
Start: 2021-05-19 | End: 1900-01-01

## 2021-05-19 NOTE — ED ADULT NURSE REASSESSMENT NOTE - NS ED NURSE REASSESS COMMENT FT1
Received report from Briana LY. Patient resting comfortably, wife at bedside, awaiting results. Will continue to monitor and treat per orders.

## 2021-05-19 NOTE — ED PROVIDER NOTE - PHYSICAL EXAMINATION
General:     NAD, well-nourished, well-appearing  Eyes: PERRL  Head:     NC/AT   Neck:     trachea midline  Lungs:     CTA b/l  CVS:     RRR  Abd:     +BS, s/nt/nd  Ext:   left leg swelling from foot to knee. FROM, sensation intact. soft compartments, no warmth or erythema   Skin: b/l heel ulcerations L>R. minimal erythema. no crepitus   Neuro: AAOx3, no sensory/motor deficits

## 2021-05-19 NOTE — PHYSICAL EXAM
[No Acute Distress] : no acute distress [Normal Sclera/Conjunctiva] : normal sclera/conjunctiva [PERRL] : pupils equal round and reactive to light [Normal Outer Ear/Nose] : the outer ears and nose were normal in appearance [No JVD] : no jugular venous distention [Normal Oropharynx] : the oropharynx was normal [Normal Rate] : normal rate  [Regular Rhythm] : with a regular rhythm [No Murmur] : no murmur heard [Normal] : soft, non-tender, non-distended, no masses palpated, no HSM and normal bowel sounds [de-identified] : Patient is somnolent [de-identified] : And is edema of the left lower extremity up to and above the knee this is not red or particularly warm mild tenderness

## 2021-05-19 NOTE — ED PROVIDER NOTE - ATTENDING CONTRIBUTION TO CARE
Dr. Banda: I performed a face to face bedside interview with patient regarding history of present illness, review of symptoms and past medical history. I completed an independent physical exam.  I have discussed patient's plan of care with PA.   I agree with note as stated above, having amended the EMR as needed to reflect my findings.   This includes HISTORY OF PRESENT ILLNESS, HIV, PAST MEDICAL/SURGICAL/FAMILY/SOCIAL HISTORY, ALLERGIES AND HOME MEDICATIONS, REVIEW OF SYSTEMS, PHYSICAL EXAM, and any PROGRESS NOTES during the time I functioned as the attending physician for this patient.  Gen:  Well appearning in NAD  Head:  NC/AT  Resp: No distress   Ext: left lower leg with swelling left heel with chronic ulcer  Skin: warm and dry as visualized Dr. Banda: I performed a face to face bedside interview with patient regarding history of present illness, review of symptoms and past medical history. I completed an independent physical exam.  I have discussed patient's plan of care with PA.   I agree with note as stated above, having amended the EMR as needed to reflect my findings.   This includes HISTORY OF PRESENT ILLNESS, HIV, PAST MEDICAL/SURGICAL/FAMILY/SOCIAL HISTORY, ALLERGIES AND HOME MEDICATIONS, REVIEW OF SYSTEMS, PHYSICAL EXAM, and any PROGRESS NOTES during the time I functioned as the attending physician for this patient.  Gen:  Well appearning in NAD  Head:  NC/AT  Resp: No distress   Ext: left lower leg with swelling left and right  heels  with chronic ulcer  Skin: warm and dry as visualized

## 2021-05-19 NOTE — ED PROVIDER NOTE - NSFOLLOWUPINSTRUCTIONS_ED_ALL_ED_FT
Leg edema is swelling caused by fluid buildup. Your legs may swell if you sit or stand for long periods of time, are pregnant, or are injured. Swelling may also occur if you have heart failure or circulation problems. This means that your heart does not pump blood through your body as it should.    DISCHARGE INSTRUCTIONS:    Call your local emergency number (911 in the [place]) for any of the following:   •You cannot walk.      •You have chest pain or trouble breathing that is worse when you lie down.      •You suddenly feel lightheaded and have trouble breathing.      •You have new and sudden chest pain. You may have more pain when you take deep breaths or cough.      •You cough up blood.      Return to the emergency department if:   •You feel faint or confused.      •Your skin turns blue or gray.      •Your leg feels warm, tender, and painful. It may be swollen and red.      Call your doctor if:   •You have a fever or feel more tired than usual.      •The veins in your legs look larger than usual. They may look full or bulging.      •Your legs itch or feel heavy.      •You have red or white areas or sores on your legs. The skin may also appear dimpled or have indentations.      •You are gaining weight.      •You have trouble moving your ankles.      •The swelling does not go away, or other parts of your body swell.      •You have questions or concerns about your condition or care.      Self-care:   •Elevate your legs. Raise your legs above the level of your heart as often as you can. This will help decrease swelling and pain. Prop your legs on pillows or blankets to keep them elevated comfortably.  Elevate Leg           •Wear pressure stockings, if directed. These tight stockings put pressure on your legs to promote blood flow and prevent blood clots. Put them on before you get out of bed. Wear the stockings during the day. Do not wear them while you sleep.  Pressure Stockings            •Stay active. Do not stand or sit for long periods of time. Ask your healthcare provider about the best exercise plan for you.  Walking for Exercise           •Eat healthy foods. Healthy foods include fruits, vegetables, whole-grain breads, low-fat dairy products, beans, lean meats, and fish. Ask if you need to be on a special diet.  Healthy Foods           •Limit sodium (salt). Salt will make your body hold even more fluid. Your healthcare provider will tell you how many milligrams (mg) of salt you can have each day.

## 2021-05-19 NOTE — ED ADULT NURSE NOTE - OBJECTIVE STATEMENT
87 yr old male brought to hospital with wife, A&Ox2 (baseline mental status) for Left lower ext swelling.  Pt has a ulcer to left heel, that wife states has been there for 3 months.  Pt was sent in by PMD for further evaluation. No fever, chill, SOB, or pain. No acute resp distress noted. Resp even and unlabored. Abd soft, NT. BRADY. Skin warm, dry and normal for race.  Wife at bedside. Safety maintained.

## 2021-05-19 NOTE — ED ADULT NURSE NOTE - PMH
Anxiety    Basal cell carcinoma  left shoulder  BPH (benign prostatic hyperplasia)    Dementia    Elevated blood sugar  secondary to steroids  Incontinence    Language barrier    Memory loss, short term    Nocturia  x1-2  Pancreatic insufficiency    Pancreatic mass  benign 2011  Pancreatitis  2011  Poor historian  both patient and wife  
no concerns

## 2021-05-19 NOTE — ED ADULT NURSE REASSESSMENT NOTE - NS ED NURSE REASSESS COMMENT FT1
Pt awaiting doppler results.  Pt resting comfortably in stretcher with wife at bedside. NAD noted. VSS. Will continue to monitor.

## 2021-05-19 NOTE — ED PROVIDER NOTE - CLINICAL SUMMARY MEDICAL DECISION MAKING FREE TEXT BOX
Pt 86 yo m sent from Dr. Vigil office for left leg swelling. pt has hx left hip fracture from January, heel ulcers and left leg swelling that was evaluated by Dr. Unger in march. had US which was neg DVT. increased swelling left leg. poor historian   declined . able to speak english   denies fever, chills, cp, sob, n/v, trauma to leg  Ext:   left leg swelling from foot to knee. FROM, sensation intact. soft compartments  Skin: b/l heel ulcerations L>R. minimal erythema. no crepitus   vitals wnl. US to eval for DVT

## 2021-05-19 NOTE — ED ADULT NURSE REASSESSMENT NOTE - NS ED NURSE REASSESS COMMENT FT1
Patient son contacted for  per wife's request. Will be there soon. Wife at bedside. Safety maintained.

## 2021-05-19 NOTE — ED PROVIDER NOTE - PATIENT PORTAL LINK FT
You can access the FollowMyHealth Patient Portal offered by [facility] by registering at the following website: [email or web address]. By joining [employer]’s FollowMyHealth portal, you will also be able to view your health information using other applications (apps) compatible with our system.

## 2021-05-19 NOTE — ED PROVIDER NOTE - OBJECTIVE STATEMENT
Pt 88 yo m sent from Dr. Vigil office for left leg swellinhg. pt has hx left hip fracture from January, heel ulcers and left leg swelling that was evaluated by Dr. Unger in march. had US which was neg DVT. increased swelling left leg. poor historian   declined . able to speak english   denies fever, chills, cp, sob, n/v, trauma to leg.

## 2021-05-19 NOTE — ASSESSMENT
[FreeTextEntry1] : She has what apparently is worsening left leg swelling emergency room note from May 12th makes no comments about the leg or any swelling previous sonogram of the leg was done over 6 weeks ago we have contacted the emergency room and spoken with the clinicians there they will evaluate him and do a sonogram and laboratory work as needed patient also will be referred to wound care for follow-up on his left heel ulcer

## 2021-05-19 NOTE — HISTORY OF PRESENT ILLNESS
[FreeTextEntry1] : Patient is here for follow-up post left hip fracture from January he apparently also has had a history of heel ulcers and some mild swelling in his left leg that was evaluated by Dr. Unger back at the end of March with a normal sonogram at that time Dr. Whitmore noted 1+ edema in the left leg on examination today he has a hugely swollen left leg which is visually different than the right leg he is somewhat subdued and does not communicate well he does complain of total body pain there however has not apparently been any severe pain in the leg no fever no chills no systemic symptoms his diarrhea seems to have abated [de-identified] : See above

## 2021-05-19 NOTE — ED PROVIDER NOTE - DATE/TIME 1
28 y/o female in ED c/o sob tonight while driving home from work now resolved.   pt also c/o right sided neck pain and LUE tingling x 4 days.   pt states breast feeds her 10 month old child and then sleeps on her right side.   states taking motrin with relief.   pt denies any fever, HA, n/v/d/abd pain.   no sick contacts or recent travel.   tolerating PO.
19-May-2021 23:29

## 2021-05-19 NOTE — ED ADULT NURSE REASSESSMENT NOTE - NS ED NURSE REASSESS COMMENT FT1
Pt awaiting CTA results. NAD noted.  VSS. Wife at bedside. Safety maintained. Will continue to monitor.

## 2021-09-02 ENCOUNTER — APPOINTMENT (OUTPATIENT)
Dept: FAMILY MEDICINE | Facility: CLINIC | Age: 86
End: 2021-09-02
Payer: MEDICARE

## 2021-09-02 VITALS
DIASTOLIC BLOOD PRESSURE: 64 MMHG | SYSTOLIC BLOOD PRESSURE: 100 MMHG | HEIGHT: 62 IN | TEMPERATURE: 97.7 F | HEART RATE: 77 BPM | OXYGEN SATURATION: 99 % | RESPIRATION RATE: 12 BRPM

## 2021-09-02 DIAGNOSIS — Z13.820 ENCOUNTER FOR SCREENING FOR OSTEOPOROSIS: ICD-10-CM

## 2021-09-02 PROCEDURE — 99213 OFFICE O/P EST LOW 20 MIN: CPT

## 2021-09-02 NOTE — HISTORY OF PRESENT ILLNESS
[FreeTextEntry1] : referral [de-identified] : Patient here today with wife/nephew, arriving at [address]. They are in need of a referral for dexa scan. The GI doc wants patient to get dexa has had rib fx, taking vit d and calcium. They are already getting blood work thru gi doc.\par \par Pt denies any SOB, cough, chest pain, palpitations, N/V/D/C, fever, or chills. No other health concerns today.\par

## 2021-09-02 NOTE — PLAN
[FreeTextEntry1] : \par DEXA ordered, pt will continue vit d and calcium. They will f/u next month with dr. eastman for flu shot and follow up. Advised patient to call with any questions or concerns. Patient verbalized understanding.

## 2021-09-02 NOTE — PHYSICAL EXAM
[Normal Sclera/Conjunctiva] : normal sclera/conjunctiva [Normal] : affect was normal and insight and judgment were intact [de-identified] : wheelchair bound

## 2021-09-11 ENCOUNTER — OUTPATIENT (OUTPATIENT)
Dept: OUTPATIENT SERVICES | Facility: HOSPITAL | Age: 86
LOS: 1 days | End: 2021-09-11
Payer: COMMERCIAL

## 2021-09-11 ENCOUNTER — APPOINTMENT (OUTPATIENT)
Dept: RADIOLOGY | Facility: HOSPITAL | Age: 86
End: 2021-09-11
Payer: MEDICARE

## 2021-09-11 DIAGNOSIS — Z98.890 OTHER SPECIFIED POSTPROCEDURAL STATES: Chronic | ICD-10-CM

## 2021-09-11 DIAGNOSIS — Z13.820 ENCOUNTER FOR SCREENING FOR OSTEOPOROSIS: ICD-10-CM

## 2021-09-11 PROCEDURE — 77080 DXA BONE DENSITY AXIAL: CPT

## 2021-09-11 PROCEDURE — 77080 DXA BONE DENSITY AXIAL: CPT | Mod: 26

## 2021-09-25 ENCOUNTER — NON-APPOINTMENT (OUTPATIENT)
Age: 86
End: 2021-09-25

## 2021-10-03 ENCOUNTER — RX RENEWAL (OUTPATIENT)
Age: 86
End: 2021-10-03

## 2022-01-20 NOTE — ASU DISCHARGE PLAN (ADULT/PEDIATRIC). - RN NAME (PRINT)_____________________________________________
Statement Selected Infliximab Counseling:  I discussed with the patient the risks of infliximab including but not limited to myelosuppression, immunosuppression, autoimmune hepatitis, demyelinating diseases, lymphoma, and serious infections.  The patient understands that monitoring is required including a PPD at baseline and must alert us or the primary physician if symptoms of infection or other concerning signs are noted.

## 2022-03-16 ENCOUNTER — RX RENEWAL (OUTPATIENT)
Age: 87
End: 2022-03-16

## 2022-03-16 RX ORDER — DONEPEZIL HYDROCHLORIDE 5 MG/1
5 TABLET ORAL
Qty: 30 | Refills: 4 | Status: ACTIVE | COMMUNITY
Start: 2019-09-11 | End: 1900-01-01

## 2022-07-19 ENCOUNTER — INPATIENT (INPATIENT)
Facility: HOSPITAL | Age: 87
LOS: 13 days | Discharge: NURSING, REHAB OR LONG-TERM CARE | DRG: 177 | End: 2022-08-02
Attending: STUDENT IN AN ORGANIZED HEALTH CARE EDUCATION/TRAINING PROGRAM | Admitting: STUDENT IN AN ORGANIZED HEALTH CARE EDUCATION/TRAINING PROGRAM
Payer: COMMERCIAL

## 2022-07-19 VITALS
DIASTOLIC BLOOD PRESSURE: 73 MMHG | RESPIRATION RATE: 18 BRPM | OXYGEN SATURATION: 98 % | TEMPERATURE: 98 F | WEIGHT: 164.91 LBS | SYSTOLIC BLOOD PRESSURE: 138 MMHG | HEART RATE: 89 BPM | HEIGHT: 62 IN

## 2022-07-19 DIAGNOSIS — Z98.890 OTHER SPECIFIED POSTPROCEDURAL STATES: Chronic | ICD-10-CM

## 2022-07-19 DIAGNOSIS — R53.1 WEAKNESS: ICD-10-CM

## 2022-07-19 LAB
ALBUMIN SERPL ELPH-MCNC: 1.7 G/DL — LOW (ref 3.3–5)
ALBUMIN SERPL ELPH-MCNC: 1.9 G/DL — LOW (ref 3.3–5)
ALP SERPL-CCNC: 92 U/L — SIGNIFICANT CHANGE UP (ref 40–120)
ALP SERPL-CCNC: 94 U/L — SIGNIFICANT CHANGE UP (ref 40–120)
ALT FLD-CCNC: 29 U/L — SIGNIFICANT CHANGE UP (ref 10–45)
ALT FLD-CCNC: 38 U/L — SIGNIFICANT CHANGE UP (ref 10–45)
ANION GAP SERPL CALC-SCNC: 10 MMOL/L — SIGNIFICANT CHANGE UP (ref 5–17)
ANION GAP SERPL CALC-SCNC: 7 MMOL/L — SIGNIFICANT CHANGE UP (ref 5–17)
APPEARANCE UR: CLEAR — SIGNIFICANT CHANGE UP
APTT BLD: 31.3 SEC — SIGNIFICANT CHANGE UP (ref 27.5–35.5)
AST SERPL-CCNC: 43 U/L — HIGH (ref 10–40)
AST SERPL-CCNC: 45 U/L — HIGH (ref 10–40)
B PERT DNA SPEC QL NAA+PROBE: SIGNIFICANT CHANGE UP
BACTERIA # UR AUTO: ABNORMAL /HPF
BASOPHILS # BLD AUTO: 0.02 K/UL — SIGNIFICANT CHANGE UP (ref 0–0.2)
BASOPHILS NFR BLD AUTO: 0.1 % — SIGNIFICANT CHANGE UP (ref 0–2)
BILIRUB DIRECT SERPL-MCNC: 0.1 MG/DL — SIGNIFICANT CHANGE UP (ref 0–0.3)
BILIRUB INDIRECT FLD-MCNC: 0.3 MG/DL — SIGNIFICANT CHANGE UP (ref 0.2–1)
BILIRUB SERPL-MCNC: 0.3 MG/DL — SIGNIFICANT CHANGE UP (ref 0.2–1.2)
BILIRUB SERPL-MCNC: 0.4 MG/DL — SIGNIFICANT CHANGE UP (ref 0.2–1.2)
BILIRUB UR-MCNC: NEGATIVE — SIGNIFICANT CHANGE UP
BUN SERPL-MCNC: 24 MG/DL — HIGH (ref 7–23)
BUN SERPL-MCNC: 26 MG/DL — HIGH (ref 7–23)
C PNEUM DNA SPEC QL NAA+PROBE: SIGNIFICANT CHANGE UP
CALCIUM SERPL-MCNC: 7.4 MG/DL — LOW (ref 8.4–10.5)
CALCIUM SERPL-MCNC: 8.1 MG/DL — LOW (ref 8.4–10.5)
CHLORIDE SERPL-SCNC: 111 MMOL/L — HIGH (ref 96–108)
CHLORIDE SERPL-SCNC: 112 MMOL/L — HIGH (ref 96–108)
CO2 SERPL-SCNC: 22 MMOL/L — SIGNIFICANT CHANGE UP (ref 22–31)
CO2 SERPL-SCNC: 31 MMOL/L — SIGNIFICANT CHANGE UP (ref 22–31)
COLOR SPEC: YELLOW — SIGNIFICANT CHANGE UP
CREAT SERPL-MCNC: 0.81 MG/DL — SIGNIFICANT CHANGE UP (ref 0.5–1.3)
CREAT SERPL-MCNC: 0.91 MG/DL — SIGNIFICANT CHANGE UP (ref 0.5–1.3)
DIFF PNL FLD: ABNORMAL
EGFR: 81 ML/MIN/1.73M2 — SIGNIFICANT CHANGE UP
EGFR: 85 ML/MIN/1.73M2 — SIGNIFICANT CHANGE UP
EOSINOPHIL # BLD AUTO: 0 K/UL — SIGNIFICANT CHANGE UP (ref 0–0.5)
EOSINOPHIL NFR BLD AUTO: 0 % — SIGNIFICANT CHANGE UP (ref 0–6)
EPI CELLS # UR: SIGNIFICANT CHANGE UP
FLUAV H1 2009 PAND RNA SPEC QL NAA+PROBE: SIGNIFICANT CHANGE UP
FLUAV H1 RNA SPEC QL NAA+PROBE: SIGNIFICANT CHANGE UP
FLUAV H3 RNA SPEC QL NAA+PROBE: SIGNIFICANT CHANGE UP
FLUAV SUBTYP SPEC NAA+PROBE: SIGNIFICANT CHANGE UP
FLUBV RNA SPEC QL NAA+PROBE: SIGNIFICANT CHANGE UP
GLUCOSE SERPL-MCNC: 102 MG/DL — HIGH (ref 70–99)
GLUCOSE SERPL-MCNC: 93 MG/DL — SIGNIFICANT CHANGE UP (ref 70–99)
GLUCOSE UR QL: NEGATIVE — SIGNIFICANT CHANGE UP
HADV DNA SPEC QL NAA+PROBE: SIGNIFICANT CHANGE UP
HCOV PNL SPEC NAA+PROBE: SIGNIFICANT CHANGE UP
HCT VFR BLD CALC: 38.9 % — LOW (ref 39–50)
HGB BLD-MCNC: 12.2 G/DL — LOW (ref 13–17)
HMPV RNA SPEC QL NAA+PROBE: SIGNIFICANT CHANGE UP
HPIV1 RNA SPEC QL NAA+PROBE: SIGNIFICANT CHANGE UP
HPIV2 RNA SPEC QL NAA+PROBE: SIGNIFICANT CHANGE UP
HPIV3 RNA SPEC QL NAA+PROBE: SIGNIFICANT CHANGE UP
HPIV4 RNA SPEC QL NAA+PROBE: SIGNIFICANT CHANGE UP
IMM GRANULOCYTES NFR BLD AUTO: 0.5 % — SIGNIFICANT CHANGE UP (ref 0–1.5)
INR BLD: 1.16 RATIO — SIGNIFICANT CHANGE UP (ref 0.88–1.16)
INR BLD: 1.2 RATIO — HIGH (ref 0.88–1.16)
KETONES UR-MCNC: ABNORMAL
LACTATE SERPL-SCNC: 1.1 MMOL/L — SIGNIFICANT CHANGE UP (ref 0.7–2)
LEUKOCYTE ESTERASE UR-ACNC: NEGATIVE — SIGNIFICANT CHANGE UP
LYMPHOCYTES # BLD AUTO: 1.14 K/UL — SIGNIFICANT CHANGE UP (ref 1–3.3)
LYMPHOCYTES # BLD AUTO: 8 % — LOW (ref 13–44)
MCHC RBC-ENTMCNC: 30 PG — SIGNIFICANT CHANGE UP (ref 27–34)
MCHC RBC-ENTMCNC: 31.4 GM/DL — LOW (ref 32–36)
MCV RBC AUTO: 95.8 FL — SIGNIFICANT CHANGE UP (ref 80–100)
MONOCYTES # BLD AUTO: 0.46 K/UL — SIGNIFICANT CHANGE UP (ref 0–0.9)
MONOCYTES NFR BLD AUTO: 3.2 % — SIGNIFICANT CHANGE UP (ref 2–14)
NEUTROPHILS # BLD AUTO: 12.56 K/UL — HIGH (ref 1.8–7.4)
NEUTROPHILS NFR BLD AUTO: 88.2 % — HIGH (ref 43–77)
NITRITE UR-MCNC: NEGATIVE — SIGNIFICANT CHANGE UP
NRBC # BLD: 0 /100 WBCS — SIGNIFICANT CHANGE UP (ref 0–0)
PH UR: 6 — SIGNIFICANT CHANGE UP (ref 5–8)
PLATELET # BLD AUTO: 244 K/UL — SIGNIFICANT CHANGE UP (ref 150–400)
POTASSIUM SERPL-MCNC: 3.7 MMOL/L — SIGNIFICANT CHANGE UP (ref 3.5–5.3)
POTASSIUM SERPL-MCNC: 3.9 MMOL/L — SIGNIFICANT CHANGE UP (ref 3.5–5.3)
POTASSIUM SERPL-SCNC: 3.7 MMOL/L — SIGNIFICANT CHANGE UP (ref 3.5–5.3)
POTASSIUM SERPL-SCNC: 3.9 MMOL/L — SIGNIFICANT CHANGE UP (ref 3.5–5.3)
PROT SERPL-MCNC: 6.3 G/DL — SIGNIFICANT CHANGE UP (ref 6–8.3)
PROT SERPL-MCNC: 6.6 G/DL — SIGNIFICANT CHANGE UP (ref 6–8.3)
PROT UR-MCNC: 30 MG/DL
PROTHROM AB SERPL-ACNC: 13.5 SEC — HIGH (ref 10.5–13.4)
PROTHROM AB SERPL-ACNC: 14 SEC — HIGH (ref 10.5–13.4)
RAPID RVP RESULT: DETECTED
RBC # BLD: 4.06 M/UL — LOW (ref 4.2–5.8)
RBC # FLD: 13.1 % — SIGNIFICANT CHANGE UP (ref 10.3–14.5)
RBC CASTS # UR COMP ASSIST: >50 /HPF (ref 0–4)
RV+EV RNA SPEC QL NAA+PROBE: SIGNIFICANT CHANGE UP
SARS-COV-2 RNA SPEC QL NAA+PROBE: DETECTED
SODIUM SERPL-SCNC: 144 MMOL/L — SIGNIFICANT CHANGE UP (ref 135–145)
SODIUM SERPL-SCNC: 149 MMOL/L — HIGH (ref 135–145)
SP GR SPEC: 1.02 — SIGNIFICANT CHANGE UP (ref 1.01–1.02)
UROBILINOGEN FLD QL: NEGATIVE — SIGNIFICANT CHANGE UP
WBC # BLD: 14.25 K/UL — HIGH (ref 3.8–10.5)
WBC # FLD AUTO: 14.25 K/UL — HIGH (ref 3.8–10.5)
WBC UR QL: NEGATIVE /HPF — SIGNIFICANT CHANGE UP (ref 0–5)

## 2022-07-19 PROCEDURE — 93010 ELECTROCARDIOGRAM REPORT: CPT

## 2022-07-19 PROCEDURE — 73630 X-RAY EXAM OF FOOT: CPT | Mod: 26,50

## 2022-07-19 PROCEDURE — 99223 1ST HOSP IP/OBS HIGH 75: CPT

## 2022-07-19 PROCEDURE — 71045 X-RAY EXAM CHEST 1 VIEW: CPT | Mod: 26

## 2022-07-19 PROCEDURE — 70450 CT HEAD/BRAIN W/O DYE: CPT | Mod: 26,MA

## 2022-07-19 PROCEDURE — 99285 EMERGENCY DEPT VISIT HI MDM: CPT

## 2022-07-19 RX ORDER — LANOLIN ALCOHOL/MO/W.PET/CERES
3 CREAM (GRAM) TOPICAL AT BEDTIME
Refills: 0 | Status: DISCONTINUED | OUTPATIENT
Start: 2022-07-19 | End: 2022-08-02

## 2022-07-19 RX ORDER — ASCORBIC ACID 60 MG
500 TABLET,CHEWABLE ORAL DAILY
Refills: 0 | Status: DISCONTINUED | OUTPATIENT
Start: 2022-07-19 | End: 2022-08-02

## 2022-07-19 RX ORDER — SERTRALINE 25 MG/1
12.5 TABLET, FILM COATED ORAL DAILY
Refills: 0 | Status: DISCONTINUED | OUTPATIENT
Start: 2022-07-19 | End: 2022-08-02

## 2022-07-19 RX ORDER — TAMSULOSIN HYDROCHLORIDE 0.4 MG/1
0.4 CAPSULE ORAL AT BEDTIME
Refills: 0 | Status: DISCONTINUED | OUTPATIENT
Start: 2022-07-19 | End: 2022-07-25

## 2022-07-19 RX ORDER — SODIUM CHLORIDE 9 MG/ML
1700 INJECTION INTRAMUSCULAR; INTRAVENOUS; SUBCUTANEOUS ONCE
Refills: 0 | Status: COMPLETED | OUTPATIENT
Start: 2022-07-19 | End: 2022-07-19

## 2022-07-19 RX ORDER — PANTOPRAZOLE SODIUM 20 MG/1
40 TABLET, DELAYED RELEASE ORAL DAILY
Refills: 0 | Status: DISCONTINUED | OUTPATIENT
Start: 2022-07-19 | End: 2022-07-20

## 2022-07-19 RX ORDER — SODIUM CHLORIDE 9 MG/ML
1000 INJECTION INTRAMUSCULAR; INTRAVENOUS; SUBCUTANEOUS
Refills: 0 | Status: DISCONTINUED | OUTPATIENT
Start: 2022-07-19 | End: 2022-07-20

## 2022-07-19 RX ORDER — SODIUM CHLORIDE 9 MG/ML
1000 INJECTION INTRAMUSCULAR; INTRAVENOUS; SUBCUTANEOUS
Refills: 0 | Status: DISCONTINUED | OUTPATIENT
Start: 2022-07-19 | End: 2022-07-19

## 2022-07-19 RX ORDER — VANCOMYCIN HCL 1 G
1000 VIAL (EA) INTRAVENOUS ONCE
Refills: 0 | Status: COMPLETED | OUTPATIENT
Start: 2022-07-19 | End: 2022-07-19

## 2022-07-19 RX ORDER — ENOXAPARIN SODIUM 100 MG/ML
40 INJECTION SUBCUTANEOUS EVERY 24 HOURS
Refills: 0 | Status: DISCONTINUED | OUTPATIENT
Start: 2022-07-19 | End: 2022-08-02

## 2022-07-19 RX ORDER — CEFEPIME 1 G/1
2000 INJECTION, POWDER, FOR SOLUTION INTRAMUSCULAR; INTRAVENOUS ONCE
Refills: 0 | Status: COMPLETED | OUTPATIENT
Start: 2022-07-19 | End: 2022-07-19

## 2022-07-19 RX ORDER — SENNA PLUS 8.6 MG/1
2 TABLET ORAL AT BEDTIME
Refills: 0 | Status: DISCONTINUED | OUTPATIENT
Start: 2022-07-19 | End: 2022-08-02

## 2022-07-19 RX ORDER — ONDANSETRON 8 MG/1
4 TABLET, FILM COATED ORAL EVERY 8 HOURS
Refills: 0 | Status: DISCONTINUED | OUTPATIENT
Start: 2022-07-19 | End: 2022-08-02

## 2022-07-19 RX ORDER — ACETAMINOPHEN 500 MG
650 TABLET ORAL EVERY 6 HOURS
Refills: 0 | Status: DISCONTINUED | OUTPATIENT
Start: 2022-07-19 | End: 2022-08-02

## 2022-07-19 RX ORDER — PIPERACILLIN AND TAZOBACTAM 4; .5 G/20ML; G/20ML
3.38 INJECTION, POWDER, LYOPHILIZED, FOR SOLUTION INTRAVENOUS EVERY 8 HOURS
Refills: 0 | Status: DISCONTINUED | OUTPATIENT
Start: 2022-07-19 | End: 2022-07-22

## 2022-07-19 RX ORDER — LIPASE/PROTEASE/AMYLASE 16-48-48K
3 CAPSULE,DELAYED RELEASE (ENTERIC COATED) ORAL
Refills: 0 | Status: DISCONTINUED | OUTPATIENT
Start: 2022-07-19 | End: 2022-07-19

## 2022-07-19 RX ORDER — LIPASE/PROTEASE/AMYLASE 16-48-48K
6 CAPSULE,DELAYED RELEASE (ENTERIC COATED) ORAL
Refills: 0 | Status: DISCONTINUED | OUTPATIENT
Start: 2022-07-19 | End: 2022-08-02

## 2022-07-19 RX ORDER — DONEPEZIL HYDROCHLORIDE 10 MG/1
5 TABLET, FILM COATED ORAL AT BEDTIME
Refills: 0 | Status: DISCONTINUED | OUTPATIENT
Start: 2022-07-19 | End: 2022-08-02

## 2022-07-19 RX ADMIN — SODIUM CHLORIDE 75 MILLILITER(S): 9 INJECTION INTRAMUSCULAR; INTRAVENOUS; SUBCUTANEOUS at 22:39

## 2022-07-19 RX ADMIN — PIPERACILLIN AND TAZOBACTAM 25 GRAM(S): 4; .5 INJECTION, POWDER, LYOPHILIZED, FOR SOLUTION INTRAVENOUS at 22:59

## 2022-07-19 RX ADMIN — ENOXAPARIN SODIUM 40 MILLIGRAM(S): 100 INJECTION SUBCUTANEOUS at 22:58

## 2022-07-19 RX ADMIN — SODIUM CHLORIDE 1700 MILLILITER(S): 9 INJECTION INTRAMUSCULAR; INTRAVENOUS; SUBCUTANEOUS at 13:37

## 2022-07-19 RX ADMIN — Medication 250 MILLIGRAM(S): at 14:06

## 2022-07-19 RX ADMIN — SODIUM CHLORIDE 100 MILLILITER(S): 9 INJECTION INTRAMUSCULAR; INTRAVENOUS; SUBCUTANEOUS at 20:46

## 2022-07-19 RX ADMIN — CEFEPIME 100 MILLIGRAM(S): 1 INJECTION, POWDER, FOR SOLUTION INTRAMUSCULAR; INTRAVENOUS at 13:37

## 2022-07-19 NOTE — H&P ADULT - NSHPLABSRESULTS_GEN_ALL_CORE
12.2   14.25 )-----------( 244      ( 2022 13:15 )             38.9     Lactate, Blood: 1.1 mmol/L ( @ 13:15)        149  |  111  |  26  ----------------------------<  102  3.7   |  31  |  0.91    Ca    8.1      2022 13:15    TPro  6.6  /  Alb  1.9  /  TBili  0.3  /  DBili  x   /  AST  45  /  ALT  38  /  AlkPhos  94      PT/INR - ( 2022 13:15 )   PT: 14.0 sec;   INR: 1.20 ratio         PTT - ( 2022 13:15 )  PTT:31.3 sec              Urinalysis Basic - ( 2022 16:10 )    Color: Yellow / Appearance: Clear / S.020 / pH: x  Gluc: x / Ketone: Small  / Bili: Negative / Urobili: Negative   Blood: x / Protein: 30 mg/dL / Nitrite: Negative   Leuk Esterase: Negative / RBC: >50 /HPF / WBC Negative /HPF   Sq Epi: x / Non Sq Epi: Neg.-Few / Bacteria: Few /HPF 12.2   14.25 )-----------( 244      ( 2022 13:15 )             38.9     Lactate, Blood: 1.1 mmol/L ( @ 13:15)        149  |  111  |  26  ----------------------------<  102  3.7   |  31  |  0.91    Ca    8.1      2022 13:15    TPro  6.6  /  Alb  1.9  /  TBili  0.3  /  DBili  x   /  AST  45  /  ALT  38  /  AlkPhos  94      PT/INR - ( 2022 13:15 )   PT: 14.0 sec;   INR: 1.20 ratio         PTT - ( 2022 13:15 )  PTT:31.3 sec      Urinalysis Basic - ( 2022 16:10 )    Color: Yellow / Appearance: Clear / S.020 / pH: x  Gluc: x / Ketone: Small  / Bili: Negative / Urobili: Negative   Blood: x / Protein: 30 mg/dL / Nitrite: Negative   Leuk Esterase: Negative / RBC: >50 /HPF / WBC Negative /HPF   Sq Epi: x / Non Sq Epi: Neg.-Few / Bacteria: Few /HPF    < from: CT Head No Cont (22 @ 15:18) >    IMPRESSION:    No acute hemorrhage, mass effect or extra-axial collections. Unchanged   ventriculomegaly which may be on the basis of normal pressure   hydrocephalus.    --- End of Report ---            PAUL PRASAD MD; Attending Radiologist  This document has been electronically signed. 2022  3:22PM    < end of copied text >

## 2022-07-19 NOTE — ED PROVIDER NOTE - CLINICAL SUMMARY MEDICAL DECISION MAKING FREE TEXT BOX
88 year old male with a PMH of dementia, pancreatic insufficiency, urinary incontinence, bph brought in by family for decreased PO  intake for 3 days and weakness. normally able to stand up on his own but unable to do so  sepsis w/o, admit 88 year old male with a PMH of dementia, pancreatic insufficiency, urinary incontinence, bph brought in by family for decreased PO  intake for 3 days and weakness. normally able to stand up on his own but unable to do so  sepsis w/o, admit  covid +

## 2022-07-19 NOTE — ED PROVIDER NOTE - PHYSICAL EXAMINATION
General:     ill appearing   Eyes: PERRL  Head:     NC/AT, EOMI, dry oral mucosa  Neck:     trachea midline  Lungs:     CTA b/l  CVS:     RRR  Abd:     +BS, s/nt/nd  Ext:   b/l heel ulcers R> L. +purulant drainage  Neuro: AAOx0, doesn't follow commands

## 2022-07-19 NOTE — ED PROVIDER NOTE - OBJECTIVE STATEMENT
88 year old male with a PMH of dementia, pancreatic insufficiency, urinary incontinence, bph brought in by family for decreased PO  intake for 3 days and weakness. normally able to stand up on his own but unable to do so

## 2022-07-19 NOTE — PATIENT PROFILE ADULT - VISION (WITH CORRECTIVE LENSES IF THE PATIENT USUALLY WEARS THEM):
Unable to assess due to pt's condition Unable to assess due to pt's condition/Partially impaired: cannot see medication labels or newsprint, but can see obstacles in path, and the surrounding layout; can count fingers at arm's length

## 2022-07-19 NOTE — ED PROVIDER NOTE - CARE PLAN
1 Principal Discharge DX:	Weakness  Secondary Diagnosis:	2019 novel coronavirus disease (COVID-19)

## 2022-07-19 NOTE — ED PROVIDER NOTE - NS ED ATTENDING STATEMENT MOD
This was a shared visit with the [facility]. I reviewed and verified the documentation and independently performed the documented:

## 2022-07-19 NOTE — ED PROVIDER NOTE - ATTENDING APP SHARED VISIT CONTRIBUTION OF CARE
88 year old male with a PMH of dementia, pancreatic insufficiency, urinary incontinence, bph brought in by family for decreased PO  intake for 3 days and weakness. normally able to stand up on his own but unable to do so  sepsis w/o, admit  covid +  :  I have reviewed and discussed with the PA/ resident the case specifics, including the history, physical assessment, evaluation, conclusion, laboratory results, and medical plan. I agree with the contents, and conclusions. I have personally examined, and interviewed the patient.

## 2022-07-19 NOTE — H&P ADULT - NSHPPHYSICALEXAM_GEN_ALL_CORE
T(C): 36.4 (07-19-22 @ 12:16), Max: 36.4 (07-19-22 @ 12:16)  HR: 80 (07-19-22 @ 12:49) (80 - 89)  BP: 121/59 (07-19-22 @ 12:49) (121/59 - 138/73)  RR: 17 (07-19-22 @ 12:49) (17 - 18)  SpO2: 97% (07-19-22 @ 12:49) (97% - 98%)  Wt(kg): --Vital Signs Last 24 Hrs  T(C): 36.4 (19 Jul 2022 12:16), Max: 36.4 (19 Jul 2022 12:16)  T(F): 97.5 (19 Jul 2022 12:16), Max: 97.5 (19 Jul 2022 12:16)  HR: 80 (19 Jul 2022 12:49) (80 - 89)  BP: 121/59 (19 Jul 2022 12:49) (121/59 - 138/73)  BP(mean): 74 (19 Jul 2022 12:49) (74 - 74)  RR: 17 (19 Jul 2022 12:49) (17 - 18)  SpO2: 97% (19 Jul 2022 12:49) (97% - 98%)    Parameters below as of 19 Jul 2022 12:49  Patient On (Oxygen Delivery Method): nasal cannula  O2 Flow (L/min): 3      PHYSICAL EXAM:  GENERAL: NAD, Elderly male, confused, ill appearing  HEAD:  Atraumatic, Normocephalic  EYES: EOMI, PERRLA, conjunctiva and sclera clear  ENMT: No tonsillar erythema, exudates, or enlargement; Dry mucous membranes, Poor dentition, No lesions  NECK: Supple, No JVD, Normal thyroid  NERVOUS SYSTEM:  Lethargic and Confused, Poor concentration; Does not follow commands or answer questions  CHEST/LUNG: Clear to percussion bilaterally; No rales, rhonchi, wheezing, or rubs  HEART: Regular rate and rhythm; No murmurs, rubs, or gallops  ABDOMEN: Soft, Nontender, Nondistended; Bowel sounds present  EXTREMITIES:  no clubbing or cyanosis, Bilateral heel ulcers  LYMPH: No lymphadenopathy noted  SKIN: No rashes or lesions

## 2022-07-19 NOTE — H&P ADULT - HISTORY OF PRESENT ILLNESS
Pt unable to provide any meaningful history - History obtained from family and chart review.     88 male with PMH dementia, pancreatic insufficiency, urinary incontinence, BPH, frequent falls (left hip fracture s/p ORIF Feb 2021), upper GIB (Feb 2021) upper endoscopy revealed duodenal ulcer presents from home with decreased oral intake for three days.  Spoke with family who reports pt has been having profound weakness, usually capable to stand up on his own but currently unable to do so.  Of note pt found to be covid positive in ED, no respiratory symptoms.  Essentially sent from home with failure to thrive, dehydration and progressive functional decline.      In ED labs significant for Leukocytosis WBC 14.25, Hypernatremia Na 149.  Pt appears very dry, dehydrated, Covid positive.  CTH in ED negative for bleed, CXR w/o consolidation or infiltrates  Bilateral heel ulcers R>L + purulent drainage     Pt unable to provide any meaningful history - History obtained from family and chart review.     88 male with PMH dementia, pancreatic insufficiency, urinary incontinence, BPH, frequent falls (left hip fracture s/p ORIF Feb 2021), upper GIB (Feb 2021 - upper endoscopy revealed duodenal ulcer) presents from home with decreased oral intake for three days.  Spoke with family who reports pt has been having profound weakness, usually capable to stand up on his own but currently unable to do so.  Of note pt found to be covid positive in ED, no respiratory symptoms.  Essentially sent from home with failure to thrive, dehydration and progressive functional decline.      In ED labs significant for Leukocytosis WBC 14.25, Hypernatremia Na 149.  Pt appears very dry, dehydrated, Covid positive.  CTH in ED negative for bleed, CXR w/o consolidation or infiltrates  Bilateral heel ulcers R>L + purulent drainage

## 2022-07-19 NOTE — ED ADULT NURSE NOTE - NS PRO PASSIVE SMOKE EXP
6902: Patient arrived ambulatory for venofer infusion. PT RIGHTS AND RESPONSIBILITIES OFFERED TO PT. Snack and beverage provided to patient. 9155: Venofer infusion started, patient tolerating well. 1030: Patient tolerating infusion well. No concerns voiced. 1130: Patient tolerating infusion well. No concerns voiced. 1230: Patient tolerating infusion well. No concerns voiced. 1311: Venofer infusion completed. Patient tolerated well. AVS reviewed with patient, voiced understanding. Discharged ambulatory. _m___ Safety:       (Environmental)  ? [place] to environment  ? Ensure ID band is correct and in place/ allergy band as needed  ? Assess for fall risk  ? Initiate fall precautions as applicable (fall band, side rails, etc.)  ? Call light within reach  ? Bed in low position/ wheels locked    _m___ Pain:       ? Assess pain level and characteristics  ? Administer analgesics as ordered  ? Assess effectiveness of pain management and report to MD as needed    _m___ Knowledge Deficit:  ? Assess baseline knowledge  ? Provide teaching at level of understanding  ? Provide teaching via preferred learning method  ? Evaluate teaching effectiveness    _m___ Hemodynamic/Respiratory Status:       (Pre and Post Procedure Monitoring)  ? Assess/Monitor vital signs and LOC  ? Assess Baseline SpO2 prior to any sedation  ? Obtain weight/height  ? Assess vital signs/ LOC until patient meets discharge criteria  ?  Monitor procedure site and notify MD of any issues
No

## 2022-07-19 NOTE — PATIENT PROFILE ADULT - FALL HARM RISK - HARM RISK INTERVENTIONS
Communicate Risk of Fall with Harm to all staff/Reinforce activity limits and safety measures with patient and family/Tailored Fall Risk Interventions/Visual Cue: Yellow wristband and red socks/Bed in lowest position, wheels locked, appropriate side rails in place/Call bell, personal items and telephone in reach/Instruct patient to call for assistance before getting out of bed or chair/Non-slip footwear when patient is out of bed/[place] to call system/Physically safe environment - no spills, clutter or unnecessary equipment/Purposeful Proactive Rounding/Room/bathroom lighting operational, light cord in reach Assistance with ambulation/Assistance OOB with selected safe patient handling equipment/Communicate Risk of Fall with Harm to all staff/Reinforce activity limits and safety measures with patient and family/Tailored Fall Risk Interventions/Visual Cue: Yellow wristband and red socks/Bed in lowest position, wheels locked, appropriate side rails in place/Call bell, personal items and telephone in reach/Instruct patient to call for assistance before getting out of bed or chair/Non-slip footwear when patient is out of bed/[place] to call system/Physically safe environment - no spills, clutter or unnecessary equipment/Purposeful Proactive Rounding/Room/bathroom lighting operational, light cord in reach

## 2022-07-19 NOTE — H&P ADULT - NS ATTEND AMEND GEN_ALL_CORE FT
87 y/o M PMH dementia, pancreatic insufficiency, BPH, hx of falls, UGIB, presents from home for progressive weakness, functional decline, diminished PO. ED course significant for positive COVID pcr, admitted for FTT, COVID-19, functional decline. f/u b/l heel xray r/o OM - continue zosyn for now, wound care. Cont remdesivir for now, supplemental O2 to maintain SpO2 > 90%. Palliative consult. Cont gentle IVF. dvt ppx

## 2022-07-19 NOTE — H&P ADULT - ASSESSMENT
88 male with PMH dementia, pancreatic insufficiency, urinary incontinence, BPH, frequent falls (left hip fracture s/p ORIF 2021), upper GIB (2021) upper endoscopy revealed duodenal ulcer presents from home with decreased oral intake for three days found with B/L heel ulcers and Covid 19 positive.     # Bilateral Heel Ulcers  # Leukocytosis   + purulent drainage  s/p cefepime and vanco in ED, 1700 ml NS bolus  maintain on IV zosyn for now  wound care consult  follow up blood and urine cultures  follow labs    # Failure To Thrive  # Covid 19 positive  Covid PCR positive on   pt with poor po intake and profound weakness  palliative care consult placed  pt is currently full code - spoke to family - said they would consider options  CXR negative, currently does not have any respiratory symptoms  supplemental oxygen PRN maintain sats > 92%  will start remdesivr as pt has risk for progression of disease  hold off on decadron as pt not hypoxic   monitor inflammatory markers  keep NPO for now pending speech and swallow evaluation    # Severe Dehydration  # Hypernatremia  pt received 1700 ml bolus NS in ED  continue IVF for now  monitor BMP    # Dementia  chronic condition  continue home aricept, zoloft    # BPH  chronic condition  continue flomax    # Pancreatic Insufficiency  chronic condition  continue home creon, prednisone  as per wife pt on prednisone since     # Prophylactic Measure  lovenox    # GOC  pt is full code for now  palliative consult placed  spoke with family who said they will discuss and consider options    spoke with family member Earline diaz (visiting from [place]) [phone]    CAPRINI SCORE [CLOT]    AGE RELATED RISK FACTORS                                                       MOBILITY RELATED FACTORS  [ ] Age 41-60 years                                            (1 Point)                  [ x Bed rest                                                        (1 Point)  [ ] Age: 61-74 years                                           (2 Points)                 [ ] Plaster cast                                                   (2 Points)  [x ] Age= 75 years                                              (3 Points)                 [ ] Bed bound for more than 72 hours                 (2 Points)    DISEASE RELATED RISK FACTORS                                               GENDER SPECIFIC FACTORS  [ ] Edema in the lower extremities                       (1 Point)                  [ ] Pregnancy                                                     (1 Point)  [ ] Varicose veins                                               (1 Point)                  [ ] Post-partum < 6 weeks                                   (1 Point)             [ ] BMI > 25 Kg/m2                                            (1 Point)                  [ ] Hormonal therapy  or oral contraception          (1 Point)                 [ ] Sepsis (in the previous month)                        (1 Point)                  [ ] History of pregnancy complications                 (1 point)  [ ] Pneumonia or serious lung disease                                               [ ] Unexplained or recurrent                     (1 Point)           (in the previous month)                               (1 Point)  [ ] Abnormal pulmonary function test                     (1 Point)                 SURGERY RELATED RISK FACTORS  [ ] Acute myocardial infarction                              (1 Point)                 [ ]  Section                                             (1 Point)  [ ] Congestive heart failure (in the previous month)  (1 Point)               [ ] Minor surgery                                                  (1 Point)   [ ] Inflammatory bowel disease                             (1 Point)                 [ ] Arthroscopic surgery                                        (2 Points)  [ ] Central venous access                                      (2 Points)                [ ] General surgery lasting more than 45 minutes   (2 Points)       [ ] Stroke (in the previous month)                          (5 Points)               [ ] Elective arthroplasty                                         (5 Points)                                                                                                                                               HEMATOLOGY RELATED FACTORS                                                 TRAUMA RELATED RISK FACTORS  [ ] Prior episodes of VTE                                     (3 Points)                [ ] Fracture of the hip, pelvis, or leg                       (5 Points)  [ ] Positive family history for VTE                         (3 Points)                 [ ] Acute spinal cord injury (in the previous month)  (5 Points)  [ ] Prothrombin 58390 A                                     (3 Points)                 [ ] Paralysis  (less than 1 month)                             (5 Points)  [ ] Factor V Leiden                                             (3 Points)                  [ ] Multiple Trauma within 1 month                        (5 Points)  [ ] Lupus anticoagulants                                     (3 Points)                                                           [ ] Anticardiolipin antibodies                               (3 Points)                                                       [ ] High homocysteine in the blood                      (3 Points)                                             [ ] Other congenital or acquired thrombophilia      (3 Points)                                                [ ] Heparin induced thrombocytopenia                  (3 Points)                                          Total Score [    4      ]    Caprini Score 0 - 2:  Low Risk, No VTE Prophylaxis required for most patients, encourage ambulation  Caprini Score 3 - 6:  At Risk, pharmacologic VTE prophylaxis is indicated for most patients (in the absence of a contraindication)  Caprini Score Greater than or = 7:  High Risk, pharmacologic VTE prophylaxis is indicated for most patients (in the absence of a contraindication)     88 male with PMH dementia, pancreatic insufficiency, urinary incontinence, BPH, frequent falls (left hip fracture s/p ORIF 2021), upper GIB (2021) upper endoscopy revealed duodenal ulcer presents from home with decreased oral intake for three days found with B/L heel ulcers and Covid 19 positive.     # Bilateral Heel Ulcers  # Leukocytosis   + purulent drainage  s/p cefepime and vanco in ED, 1700 ml NS bolus  maintain on IV zosyn for now  wound care consult  follow up blood and urine cultures  follow labs    # Failure To Thrive  # Covid 19 positive  Covid PCR positive on   pt with poor po intake and profound weakness  palliative care consult placed  pt is currently full code - spoke to family - said they would consider options  CXR negative, currently does not have any respiratory symptoms  supplemental oxygen PRN maintain sats > 92%  will start remdesivr as pt has risk for progression of disease  hold off on decadron as pt not hypoxic   monitor inflammatory markers  keep NPO for now pending speech and swallow evaluation  PT evaluation    # Severe Dehydration  # Hypernatremia  pt received 1700 ml bolus NS in ED  continue IVF for now  monitor BMP    # Dementia  chronic condition  continue home aricept, zoloft    # BPH  chronic condition  continue flomax    # Pancreatic Insufficiency  chronic condition  continue home creon, prednisone  as per wife pt on prednisone since     # Prophylactic Measure  lovenox    # GOC  pt is full code for now  palliative consult placed  spoke with family who said they will discuss and consider options    spoke with family member Earline diaz (visiting from [place]) [phone]    CAPRINI SCORE [CLOT]    AGE RELATED RISK FACTORS                                                       MOBILITY RELATED FACTORS  [ ] Age 41-60 years                                            (1 Point)                  [ x Bed rest                                                        (1 Point)  [ ] Age: 61-74 years                                           (2 Points)                 [ ] Plaster cast                                                   (2 Points)  [x ] Age= 75 years                                              (3 Points)                 [ ] Bed bound for more than 72 hours                 (2 Points)    DISEASE RELATED RISK FACTORS                                               GENDER SPECIFIC FACTORS  [ ] Edema in the lower extremities                       (1 Point)                  [ ] Pregnancy                                                     (1 Point)  [ ] Varicose veins                                               (1 Point)                  [ ] Post-partum < 6 weeks                                   (1 Point)             [ ] BMI > 25 Kg/m2                                            (1 Point)                  [ ] Hormonal therapy  or oral contraception          (1 Point)                 [ ] Sepsis (in the previous month)                        (1 Point)                  [ ] History of pregnancy complications                 (1 point)  [ ] Pneumonia or serious lung disease                                               [ ] Unexplained or recurrent                     (1 Point)           (in the previous month)                               (1 Point)  [ ] Abnormal pulmonary function test                     (1 Point)                 SURGERY RELATED RISK FACTORS  [ ] Acute myocardial infarction                              (1 Point)                 [ ]  Section                                             (1 Point)  [ ] Congestive heart failure (in the previous month)  (1 Point)               [ ] Minor surgery                                                  (1 Point)   [ ] Inflammatory bowel disease                             (1 Point)                 [ ] Arthroscopic surgery                                        (2 Points)  [ ] Central venous access                                      (2 Points)                [ ] General surgery lasting more than 45 minutes   (2 Points)       [ ] Stroke (in the previous month)                          (5 Points)               [ ] Elective arthroplasty                                         (5 Points)                                                                                                                                               HEMATOLOGY RELATED FACTORS                                                 TRAUMA RELATED RISK FACTORS  [ ] Prior episodes of VTE                                     (3 Points)                [ ] Fracture of the hip, pelvis, or leg                       (5 Points)  [ ] Positive family history for VTE                         (3 Points)                 [ ] Acute spinal cord injury (in the previous month)  (5 Points)  [ ] Prothrombin 18293 A                                     (3 Points)                 [ ] Paralysis  (less than 1 month)                             (5 Points)  [ ] Factor V Leiden                                             (3 Points)                  [ ] Multiple Trauma within 1 month                        (5 Points)  [ ] Lupus anticoagulants                                     (3 Points)                                                           [ ] Anticardiolipin antibodies                               (3 Points)                                                       [ ] High homocysteine in the blood                      (3 Points)                                             [ ] Other congenital or acquired thrombophilia      (3 Points)                                                [ ] Heparin induced thrombocytopenia                  (3 Points)                                          Total Score [    4      ]    Caprini Score 0 - 2:  Low Risk, No VTE Prophylaxis required for most patients, encourage ambulation  Caprini Score 3 - 6:  At Risk, pharmacologic VTE prophylaxis is indicated for most patients (in the absence of a contraindication)  Caprini Score Greater than or = 7:  High Risk, pharmacologic VTE prophylaxis is indicated for most patients (in the absence of a contraindication)     88 male with PMH dementia, pancreatic insufficiency, urinary incontinence, BPH, frequent falls (left hip fracture s/p ORIF 2021), upper GIB (2021) upper endoscopy revealed duodenal ulcer presents from home with decreased oral intake for three days found with B/L heel ulcers and Covid 19 positive.     # Bilateral Heel Ulcers  # Leukocytosis   + purulent drainage  s/p cefepime and vanco in ED, 1700 ml NS bolus  maintain on IV zosyn for now  wound care consult  follow up blood and urine cultures  follow labs    # Failure To Thrive  # Covid 19 positive  Covid PCR positive on   pt with poor po intake and profound weakness  palliative care consult placed  pt is currently full code - spoke to family - said they would consider options  CXR negative, currently does not have any respiratory symptoms  supplemental oxygen PRN maintain sats > 92%  will start remdesivr as pt has risk for progression of disease  hold off on decadron as pt not hypoxic   monitor inflammatory markers  keep NPO for now pending speech and swallow evaluation  PT evaluation    # Severe Dehydration  # Hypernatremia  pt received 1700 ml bolus NS in ED  continue IVF for now  monitor BMP    # Dementia  chronic condition  continue home aricept, zoloft    # BPH  chronic condition  continue flomax    # Pancreatic Insufficiency  chronic condition  continue home creon, prednisone  as per wife pt on prednisone since     # Prophylactic Measure  lovenox    # GOC  pt is full code for now  palliative consult placed  spoke with family who said they will discuss and consider options    PMD Dr. Hernández made aware of admission    spoke with family member Earline joe (visiting from [place]) [phone]    CAPRINI SCORE [CLOT]    AGE RELATED RISK FACTORS                                                       MOBILITY RELATED FACTORS  [ ] Age 41-60 years                                            (1 Point)                  [ x Bed rest                                                        (1 Point)  [ ] Age: 61-74 years                                           (2 Points)                 [ ] Plaster cast                                                   (2 Points)  [x ] Age= 75 years                                              (3 Points)                 [ ] Bed bound for more than 72 hours                 (2 Points)    DISEASE RELATED RISK FACTORS                                               GENDER SPECIFIC FACTORS  [ ] Edema in the lower extremities                       (1 Point)                  [ ] Pregnancy                                                     (1 Point)  [ ] Varicose veins                                               (1 Point)                  [ ] Post-partum < 6 weeks                                   (1 Point)             [ ] BMI > 25 Kg/m2                                            (1 Point)                  [ ] Hormonal therapy  or oral contraception          (1 Point)                 [ ] Sepsis (in the previous month)                        (1 Point)                  [ ] History of pregnancy complications                 (1 point)  [ ] Pneumonia or serious lung disease                                               [ ] Unexplained or recurrent                     (1 Point)           (in the previous month)                               (1 Point)  [ ] Abnormal pulmonary function test                     (1 Point)                 SURGERY RELATED RISK FACTORS  [ ] Acute myocardial infarction                              (1 Point)                 [ ]  Section                                             (1 Point)  [ ] Congestive heart failure (in the previous month)  (1 Point)               [ ] Minor surgery                                                  (1 Point)   [ ] Inflammatory bowel disease                             (1 Point)                 [ ] Arthroscopic surgery                                        (2 Points)  [ ] Central venous access                                      (2 Points)                [ ] General surgery lasting more than 45 minutes   (2 Points)       [ ] Stroke (in the previous month)                          (5 Points)               [ ] Elective arthroplasty                                         (5 Points)                                                                                                                                               HEMATOLOGY RELATED FACTORS                                                 TRAUMA RELATED RISK FACTORS  [ ] Prior episodes of VTE                                     (3 Points)                [ ] Fracture of the hip, pelvis, or leg                       (5 Points)  [ ] Positive family history for VTE                         (3 Points)                 [ ] Acute spinal cord injury (in the previous month)  (5 Points)  [ ] Prothrombin 76533 A                                     (3 Points)                 [ ] Paralysis  (less than 1 month)                             (5 Points)  [ ] Factor V Leiden                                             (3 Points)                  [ ] Multiple Trauma within 1 month                        (5 Points)  [ ] Lupus anticoagulants                                     (3 Points)                                                           [ ] Anticardiolipin antibodies                               (3 Points)                                                       [ ] High homocysteine in the blood                      (3 Points)                                             [ ] Other congenital or acquired thrombophilia      (3 Points)                                                [ ] Heparin induced thrombocytopenia                  (3 Points)                                          Total Score [    4      ]    Caprini Score 0 - 2:  Low Risk, No VTE Prophylaxis required for most patients, encourage ambulation  Caprini Score 3 - 6:  At Risk, pharmacologic VTE prophylaxis is indicated for most patients (in the absence of a contraindication)  Caprini Score Greater than or = 7:  High Risk, pharmacologic VTE prophylaxis is indicated for most patients (in the absence of a contraindication)     88 male with PMH dementia, pancreatic insufficiency, urinary incontinence, BPH, frequent falls (left hip fracture s/p ORIF 2021), upper GIB (2021) upper endoscopy revealed duodenal ulcer presents from home with decreased oral intake for three days found with B/L heel ulcers and Covid 19 positive.     # Bilateral Heel Ulcers  # Leukocytosis   + purulent drainage  s/p cefepime and vanco in ED, 1700 ml NS bolus  maintain on IV zosyn for now  wound care consult  follow up blood and urine cultures  follow up Bilateral Foot Xray rule out Osteo  follow labs    # Failure To Thrive  # Covid 19 positive  Covid PCR positive on   pt with poor po intake and profound weakness  palliative care consult placed  pt is currently full code - spoke to family - said they would consider options  CXR negative, currently does not have any respiratory symptoms  supplemental oxygen PRN maintain sats > 92%  will start remdesivr as pt has risk for progression of disease  hold off on decadron as pt not hypoxic   monitor inflammatory markers  keep NPO for now pending speech and swallow evaluation  PT evaluation    # Severe Dehydration  # Hypernatremia  pt received 1700 ml bolus NS in ED  continue IVF for now  monitor BMP    # Dementia  chronic condition  continue home aricept, zoloft    # BPH  chronic condition  continue flomax    # Pancreatic Insufficiency  chronic condition  continue home creon, prednisone  as per wife pt on prednisone since     # Prophylactic Measure  lovenox    # GOC  pt is full code for now  palliative consult placed  spoke with family who said they will discuss and consider options    PMD Dr. Hernández made aware of admission    spoke with family member Earline joe (visiting from [place]) [phone]    CAPRINI SCORE [CLOT]    AGE RELATED RISK FACTORS                                                       MOBILITY RELATED FACTORS  [ ] Age 41-60 years                                            (1 Point)                  [ x Bed rest                                                        (1 Point)  [ ] Age: 61-74 years                                           (2 Points)                 [ ] Plaster cast                                                   (2 Points)  [x ] Age= 75 years                                              (3 Points)                 [ ] Bed bound for more than 72 hours                 (2 Points)    DISEASE RELATED RISK FACTORS                                               GENDER SPECIFIC FACTORS  [ ] Edema in the lower extremities                       (1 Point)                  [ ] Pregnancy                                                     (1 Point)  [ ] Varicose veins                                               (1 Point)                  [ ] Post-partum < 6 weeks                                   (1 Point)             [ ] BMI > 25 Kg/m2                                            (1 Point)                  [ ] Hormonal therapy  or oral contraception          (1 Point)                 [ ] Sepsis (in the previous month)                        (1 Point)                  [ ] History of pregnancy complications                 (1 point)  [ ] Pneumonia or serious lung disease                                               [ ] Unexplained or recurrent                     (1 Point)           (in the previous month)                               (1 Point)  [ ] Abnormal pulmonary function test                     (1 Point)                 SURGERY RELATED RISK FACTORS  [ ] Acute myocardial infarction                              (1 Point)                 [ ]  Section                                             (1 Point)  [ ] Congestive heart failure (in the previous month)  (1 Point)               [ ] Minor surgery                                                  (1 Point)   [ ] Inflammatory bowel disease                             (1 Point)                 [ ] Arthroscopic surgery                                        (2 Points)  [ ] Central venous access                                      (2 Points)                [ ] General surgery lasting more than 45 minutes   (2 Points)       [ ] Stroke (in the previous month)                          (5 Points)               [ ] Elective arthroplasty                                         (5 Points)                                                                                                                                               HEMATOLOGY RELATED FACTORS                                                 TRAUMA RELATED RISK FACTORS  [ ] Prior episodes of VTE                                     (3 Points)                [ ] Fracture of the hip, pelvis, or leg                       (5 Points)  [ ] Positive family history for VTE                         (3 Points)                 [ ] Acute spinal cord injury (in the previous month)  (5 Points)  [ ] Prothrombin 02199 A                                     (3 Points)                 [ ] Paralysis  (less than 1 month)                             (5 Points)  [ ] Factor V Leiden                                             (3 Points)                  [ ] Multiple Trauma within 1 month                        (5 Points)  [ ] Lupus anticoagulants                                     (3 Points)                                                           [ ] Anticardiolipin antibodies                               (3 Points)                                                       [ ] High homocysteine in the blood                      (3 Points)                                             [ ] Other congenital or acquired thrombophilia      (3 Points)                                                [ ] Heparin induced thrombocytopenia                  (3 Points)                                          Total Score [    4      ]    Caprini Score 0 - 2:  Low Risk, No VTE Prophylaxis required for most patients, encourage ambulation  Caprini Score 3 - 6:  At Risk, pharmacologic VTE prophylaxis is indicated for most patients (in the absence of a contraindication)  Caprini Score Greater than or = 7:  High Risk, pharmacologic VTE prophylaxis is indicated for most patients (in the absence of a contraindication)

## 2022-07-20 LAB
ALBUMIN SERPL ELPH-MCNC: 1.7 G/DL — LOW (ref 3.3–5)
ALBUMIN SERPL ELPH-MCNC: 1.7 G/DL — LOW (ref 3.3–5)
ALP SERPL-CCNC: 84 U/L — SIGNIFICANT CHANGE UP (ref 40–120)
ALP SERPL-CCNC: 85 U/L — SIGNIFICANT CHANGE UP (ref 40–120)
ALT FLD-CCNC: 27 U/L — SIGNIFICANT CHANGE UP (ref 10–45)
ALT FLD-CCNC: 29 U/L — SIGNIFICANT CHANGE UP (ref 10–45)
ANION GAP SERPL CALC-SCNC: 8 MMOL/L — SIGNIFICANT CHANGE UP (ref 5–17)
AST SERPL-CCNC: 36 U/L — SIGNIFICANT CHANGE UP (ref 10–40)
AST SERPL-CCNC: 39 U/L — SIGNIFICANT CHANGE UP (ref 10–40)
BASOPHILS # BLD AUTO: 0.02 K/UL — SIGNIFICANT CHANGE UP (ref 0–0.2)
BASOPHILS NFR BLD AUTO: 0.2 % — SIGNIFICANT CHANGE UP (ref 0–2)
BILIRUB DIRECT SERPL-MCNC: 0.2 MG/DL — SIGNIFICANT CHANGE UP (ref 0–0.3)
BILIRUB INDIRECT FLD-MCNC: 0.3 MG/DL — SIGNIFICANT CHANGE UP (ref 0.2–1)
BILIRUB SERPL-MCNC: 0.5 MG/DL — SIGNIFICANT CHANGE UP (ref 0.2–1.2)
BILIRUB SERPL-MCNC: 0.5 MG/DL — SIGNIFICANT CHANGE UP (ref 0.2–1.2)
BUN SERPL-MCNC: 23 MG/DL — SIGNIFICANT CHANGE UP (ref 7–23)
CALCIUM SERPL-MCNC: 7.7 MG/DL — LOW (ref 8.4–10.5)
CHLORIDE SERPL-SCNC: 113 MMOL/L — HIGH (ref 96–108)
CO2 SERPL-SCNC: 27 MMOL/L — SIGNIFICANT CHANGE UP (ref 22–31)
CREAT SERPL-MCNC: 0.9 MG/DL — SIGNIFICANT CHANGE UP (ref 0.5–1.3)
CREAT SERPL-MCNC: 0.93 MG/DL — SIGNIFICANT CHANGE UP (ref 0.5–1.3)
CRP SERPL-MCNC: 178 MG/L — HIGH
CULTURE RESULTS: NO GROWTH — SIGNIFICANT CHANGE UP
D DIMER BLD IA.RAPID-MCNC: 588 NG/ML DDU — HIGH
EGFR: 79 ML/MIN/1.73M2 — SIGNIFICANT CHANGE UP
EGFR: 82 ML/MIN/1.73M2 — SIGNIFICANT CHANGE UP
EOSINOPHIL # BLD AUTO: 0.02 K/UL — SIGNIFICANT CHANGE UP (ref 0–0.5)
EOSINOPHIL NFR BLD AUTO: 0.2 % — SIGNIFICANT CHANGE UP (ref 0–6)
FERRITIN SERPL-MCNC: 592 NG/ML — HIGH (ref 30–400)
GLUCOSE SERPL-MCNC: 90 MG/DL — SIGNIFICANT CHANGE UP (ref 70–99)
HCT VFR BLD CALC: 38.5 % — LOW (ref 39–50)
HGB BLD-MCNC: 12.2 G/DL — LOW (ref 13–17)
IMM GRANULOCYTES NFR BLD AUTO: 0.3 % — SIGNIFICANT CHANGE UP (ref 0–1.5)
INR BLD: 1.15 RATIO — SIGNIFICANT CHANGE UP (ref 0.88–1.16)
LYMPHOCYTES # BLD AUTO: 1.04 K/UL — SIGNIFICANT CHANGE UP (ref 1–3.3)
LYMPHOCYTES # BLD AUTO: 11.8 % — LOW (ref 13–44)
MCHC RBC-ENTMCNC: 29.9 PG — SIGNIFICANT CHANGE UP (ref 27–34)
MCHC RBC-ENTMCNC: 31.7 GM/DL — LOW (ref 32–36)
MCV RBC AUTO: 94.4 FL — SIGNIFICANT CHANGE UP (ref 80–100)
MONOCYTES # BLD AUTO: 0.36 K/UL — SIGNIFICANT CHANGE UP (ref 0–0.9)
MONOCYTES NFR BLD AUTO: 4.1 % — SIGNIFICANT CHANGE UP (ref 2–14)
NEUTROPHILS # BLD AUTO: 7.31 K/UL — SIGNIFICANT CHANGE UP (ref 1.8–7.4)
NEUTROPHILS NFR BLD AUTO: 83.4 % — HIGH (ref 43–77)
NRBC # BLD: 0 /100 WBCS — SIGNIFICANT CHANGE UP (ref 0–0)
PLATELET # BLD AUTO: 223 K/UL — SIGNIFICANT CHANGE UP (ref 150–400)
POTASSIUM SERPL-MCNC: 3.4 MMOL/L — LOW (ref 3.5–5.3)
POTASSIUM SERPL-SCNC: 3.4 MMOL/L — LOW (ref 3.5–5.3)
PROCALCITONIN SERPL-MCNC: 0.22 NG/ML — HIGH
PROT SERPL-MCNC: 6.1 G/DL — SIGNIFICANT CHANGE UP (ref 6–8.3)
PROT SERPL-MCNC: 6.2 G/DL — SIGNIFICANT CHANGE UP (ref 6–8.3)
PROTHROM AB SERPL-ACNC: 13.4 SEC — SIGNIFICANT CHANGE UP (ref 10.5–13.4)
RBC # BLD: 4.08 M/UL — LOW (ref 4.2–5.8)
RBC # FLD: 13.2 % — SIGNIFICANT CHANGE UP (ref 10.3–14.5)
SODIUM SERPL-SCNC: 148 MMOL/L — HIGH (ref 135–145)
SPECIMEN SOURCE: SIGNIFICANT CHANGE UP
WBC # BLD: 8.78 K/UL — SIGNIFICANT CHANGE UP (ref 3.8–10.5)
WBC # FLD AUTO: 8.78 K/UL — SIGNIFICANT CHANGE UP (ref 3.8–10.5)

## 2022-07-20 PROCEDURE — 99223 1ST HOSP IP/OBS HIGH 75: CPT

## 2022-07-20 PROCEDURE — 99497 ADVNCD CARE PLAN 30 MIN: CPT | Mod: 25

## 2022-07-20 PROCEDURE — 99233 SBSQ HOSP IP/OBS HIGH 50: CPT

## 2022-07-20 RX ORDER — REMDESIVIR 5 MG/ML
100 INJECTION INTRAVENOUS EVERY 24 HOURS
Refills: 0 | Status: DISCONTINUED | OUTPATIENT
Start: 2022-07-21 | End: 2022-07-21

## 2022-07-20 RX ORDER — LIPASE/PROTEASE/AMYLASE 16-48-48K
3 CAPSULE,DELAYED RELEASE (ENTERIC COATED) ORAL
Qty: 0 | Refills: 0 | DISCHARGE

## 2022-07-20 RX ORDER — POTASSIUM CHLORIDE 20 MEQ
40 PACKET (EA) ORAL ONCE
Refills: 0 | Status: COMPLETED | OUTPATIENT
Start: 2022-07-20 | End: 2022-07-20

## 2022-07-20 RX ORDER — COLLAGENASE CLOSTRIDIUM HIST. 250 UNIT/G
1 OINTMENT (GRAM) TOPICAL DAILY
Refills: 0 | Status: DISCONTINUED | OUTPATIENT
Start: 2022-07-20 | End: 2022-08-02

## 2022-07-20 RX ORDER — TAMSULOSIN HYDROCHLORIDE 0.4 MG/1
1 CAPSULE ORAL
Qty: 0 | Refills: 0 | DISCHARGE

## 2022-07-20 RX ORDER — LIPASE/PROTEASE/AMYLASE 16-48-48K
0 CAPSULE,DELAYED RELEASE (ENTERIC COATED) ORAL
Qty: 0 | Refills: 0 | DISCHARGE

## 2022-07-20 RX ORDER — DONEPEZIL HYDROCHLORIDE 10 MG/1
1 TABLET, FILM COATED ORAL
Qty: 0 | Refills: 0 | DISCHARGE

## 2022-07-20 RX ORDER — SERTRALINE 25 MG/1
1 TABLET, FILM COATED ORAL
Qty: 0 | Refills: 0 | DISCHARGE

## 2022-07-20 RX ORDER — PANTOPRAZOLE SODIUM 20 MG/1
40 TABLET, DELAYED RELEASE ORAL
Refills: 0 | Status: DISCONTINUED | OUTPATIENT
Start: 2022-07-20 | End: 2022-07-31

## 2022-07-20 RX ORDER — REMDESIVIR 5 MG/ML
200 INJECTION INTRAVENOUS EVERY 24 HOURS
Refills: 0 | Status: COMPLETED | OUTPATIENT
Start: 2022-07-20 | End: 2022-07-20

## 2022-07-20 RX ORDER — REMDESIVIR 5 MG/ML
INJECTION INTRAVENOUS
Refills: 0 | Status: DISCONTINUED | OUTPATIENT
Start: 2022-07-20 | End: 2022-07-21

## 2022-07-20 RX ORDER — MIRABEGRON 50 MG/1
1 TABLET, EXTENDED RELEASE ORAL
Qty: 0 | Refills: 0 | DISCHARGE

## 2022-07-20 RX ORDER — SODIUM CHLORIDE 9 MG/ML
1000 INJECTION, SOLUTION INTRAVENOUS
Refills: 0 | Status: DISCONTINUED | OUTPATIENT
Start: 2022-07-20 | End: 2022-07-20

## 2022-07-20 RX ADMIN — Medication 2.5 MILLIGRAM(S): at 11:35

## 2022-07-20 RX ADMIN — SODIUM CHLORIDE 75 MILLILITER(S): 9 INJECTION, SOLUTION INTRAVENOUS at 11:47

## 2022-07-20 RX ADMIN — REMDESIVIR 500 MILLIGRAM(S): 5 INJECTION INTRAVENOUS at 17:26

## 2022-07-20 RX ADMIN — Medication 1 APPLICATION(S): at 17:26

## 2022-07-20 RX ADMIN — PIPERACILLIN AND TAZOBACTAM 25 GRAM(S): 4; .5 INJECTION, POWDER, LYOPHILIZED, FOR SOLUTION INTRAVENOUS at 05:42

## 2022-07-20 RX ADMIN — Medication 500 MILLIGRAM(S): at 11:36

## 2022-07-20 RX ADMIN — SERTRALINE 12.5 MILLIGRAM(S): 25 TABLET, FILM COATED ORAL at 11:35

## 2022-07-20 RX ADMIN — TAMSULOSIN HYDROCHLORIDE 0.4 MILLIGRAM(S): 0.4 CAPSULE ORAL at 21:26

## 2022-07-20 RX ADMIN — DONEPEZIL HYDROCHLORIDE 5 MILLIGRAM(S): 10 TABLET, FILM COATED ORAL at 21:26

## 2022-07-20 RX ADMIN — Medication 40 MILLIEQUIVALENT(S): at 14:29

## 2022-07-20 RX ADMIN — Medication 6 CAPSULE(S): at 11:36

## 2022-07-20 RX ADMIN — ENOXAPARIN SODIUM 40 MILLIGRAM(S): 100 INJECTION SUBCUTANEOUS at 22:11

## 2022-07-20 RX ADMIN — PIPERACILLIN AND TAZOBACTAM 25 GRAM(S): 4; .5 INJECTION, POWDER, LYOPHILIZED, FOR SOLUTION INTRAVENOUS at 21:26

## 2022-07-20 RX ADMIN — SENNA PLUS 2 TABLET(S): 8.6 TABLET ORAL at 21:25

## 2022-07-20 RX ADMIN — PIPERACILLIN AND TAZOBACTAM 25 GRAM(S): 4; .5 INJECTION, POWDER, LYOPHILIZED, FOR SOLUTION INTRAVENOUS at 14:28

## 2022-07-20 RX ADMIN — Medication 6 CAPSULE(S): at 17:26

## 2022-07-20 NOTE — SWALLOW BEDSIDE ASSESSMENT ADULT - PHARYNGEAL PHASE
Within functional limits Cough post oral intake/Delayed cough post oral intake/Multiple swallows Throat clear post oral intake/Multiple swallows

## 2022-07-20 NOTE — DIETITIAN INITIAL EVALUATION ADULT - NSFNSPHYEXAMSKINFT_GEN_A_CORE
Pressure Injury 1: Right:,heel, Stage III  Pressure Injury 2: Left:,heel, Suspected deep tissue injury  Pressure Injury 3: Right:,Hip, Suspected deep tissue injury

## 2022-07-20 NOTE — PROGRESS NOTE ADULT - NS ATTEND AMEND GEN_ALL_CORE FT
88M with PMH dementia, pancreatic insufficiency, urinary incontinence, BPH, frequent falls (left hip fracture s/p ORIF Feb 2021), upper GIB (Feb 2021) presents from home with decreased oral intake for three days found with B/L heel ulcers and Covid 19 positive. Start Remdesivir. Wound care for bilateral heal wounds, can stop abx. Overall nontoxic appearing, afebrile. PT evaluation.   Palliative care consulted for GOC. 88M with PMH dementia, pancreatic insufficiency, urinary incontinence, BPH, frequent falls (left hip fracture s/p ORIF Feb 2021), upper GIB (Feb 2021) presents from home with decreased oral intake for three days found with B/L heel ulcers and Covid 19 positive. Start Remdesivir. Wound care for bilateral heal wounds. Continue IV zosyn for now, pending blood cultures and bilateral foot x-ray to eval for osteo. Overall nontoxic appearing, afebrile. PT evaluation.   Palliative care consulted for GOC.

## 2022-07-20 NOTE — ADVANCED PRACTICE NURSE CONSULT - ASSESSMENT
Patient seen in bed, drowsy but responsive to voice, mostly non-verbal & confused at baseline.  On the patient Right greater trochanter there is a superficial, full thickness injury, with an open area 3 x 3 x 0.3 cm, 50% covered with yellow mottled slough. The periwound is blanchable erythema to about 1 cm. At the superior edge, there is a 2 x 2 cm area of dark purple skin discoloration (DTI).   The Left posterior heel has a 1.5 x 1.5 cm area of dark purple discoloration, proximal & lateral heel has an open wound, 1 x 1 cm, covered 100% by thick, dry, yellow eschar.  The right heel has a full thickness wound, measuring 3.5 x 3.5 x 0.3 cm, with non blanchable erythema to about 2 cm. The wound bed is pale red, with 30% slough. No warmth, odor or edema noted. Scant/Moderate sero-sanguinous drainage noted on prior dressing.

## 2022-07-20 NOTE — CONSULT NOTE ADULT - CONVERSATION DETAILS
Pt has had slowly declining status at home with progressively poor po intake.  Case reviewed with daughter Agustina.  Pt wife defers to Agustina and son Jv.  We discussed cpr and artificial nutrition.  Molst reviewed.  Daughter will review with family and we will discuss options again tomorrow.

## 2022-07-20 NOTE — ADVANCED PRACTICE NURSE CONSULT - REASON FOR CONSULT
Assessment & Recommendations for Present on Admission Pressure Injuries to Bilateral Heels & Right Hip

## 2022-07-20 NOTE — DIETITIAN INITIAL EVALUATION ADULT - PERTINENT MEDS FT
MEDICATIONS  (STANDING):  ascorbic acid 500 milliGRAM(s) Oral daily  donepezil 5 milliGRAM(s) Oral at bedtime  enoxaparin Injectable 40 milliGRAM(s) SubCutaneous every 24 hours  pancrelipase  (CREON 12,000 Lipase Units) 6 Capsule(s) Oral three times a day with meals  pantoprazole  Injectable 40 milliGRAM(s) IV Push daily  piperacillin/tazobactam IVPB.. 3.375 Gram(s) IV Intermittent every 8 hours  predniSONE   Tablet 2.5 milliGRAM(s) Oral daily  senna 2 Tablet(s) Oral at bedtime  sertraline 12.5 milliGRAM(s) Oral daily  sodium chloride 0.9%. 1000 milliLiter(s) (75 mL/Hr) IV Continuous <Continuous>  tamsulosin 0.4 milliGRAM(s) Oral at bedtime    MEDICATIONS  (PRN):  acetaminophen     Tablet .. 650 milliGRAM(s) Oral every 6 hours PRN Temp greater or equal to 38C (100.4F), Mild Pain (1 - 3)  aluminum hydroxide/magnesium hydroxide/simethicone Suspension 30 milliLiter(s) Oral every 4 hours PRN Dyspepsia  melatonin 3 milliGRAM(s) Oral at bedtime PRN Insomnia  ondansetron Injectable 4 milliGRAM(s) IV Push every 8 hours PRN Nausea and/or Vomiting

## 2022-07-20 NOTE — PHYSICAL THERAPY INITIAL EVALUATION ADULT - ADDITIONAL COMMENTS
PT left message for pt's spouse Kayla ([phone]) to obtain social history/prior level of function as pt is unable to participate in interview

## 2022-07-20 NOTE — DIETITIAN INITIAL EVALUATION ADULT - PERTINENT LABORATORY DATA
07-20    148<H>  |  113<H>  |  23  ----------------------------<  90  3.4<L>   |  27  |  0.93    Ca    7.7<L>      20 Jul 2022 05:56    TPro  6.1  /  Alb  1.7<L>  /  TBili  0.5  /  DBili  0.2  /  AST  36  /  ALT  27  /  AlkPhos  84  07-20

## 2022-07-20 NOTE — ADVANCED PRACTICE NURSE CONSULT - RECOMMEDATIONS
Suggest daily normal saline cleanse of Right Hip wound, pat dry. Apply cavillon film barrier to periwound (including proximal DTI) daily. Apply Santyl ointment to wound bed & cover with Allevyn foam daily.  Until Podiatry consult,suggest daily normal saline cleanse of bilateral heel wounds, pat dry. Apply Cavillon film barrier to periwounds (including posterior L heel DTI), apply Santyl ointment, cover with Allevyn foam daily.    Offload all bony prominences with strict Turn & Position at least every 2 hours. Suggest cradle booties bilaterally to offload heels at all times while in bed.  Nutritional support per Dietician's recommendations.  Limit number of layers between patient & mattress.  Suggest daily normal saline cleanse of Right Hip wound, pat dry. Apply cavillon film barrier to periwound (including proximal DTI) daily. Apply Santyl ointment to wound bed & cover with Allevyn foam daily.  Consider Podiatry and/or Vascular consults for evaluation of bilateral heel wounds if Goals of Care warrant.  Suggest daily normal saline cleanse of bilateral heel wounds, pat dry. Apply Cavillon film barrier to periwounds (including posterior L heel DTI), apply Santyl ointment, cover with Allevyn foam daily.    Offload all bony prominences with strict Turn & Position at least every 2 hours. Suggest cradle booties bilaterally to offload heels at all times while in bed.  Nutritional support per Dietician's recommendations.  Limit number of layers between patient & mattress.

## 2022-07-20 NOTE — DIETITIAN NUTRITION RISK NOTIFICATION - TREATMENT: THE FOLLOWING DIET HAS BEEN RECOMMENDED
Diet, Pureed:   Mildly Thick Liquids (MILDTHICKLIQS) No Straws  Single Sips Only (07-20-22 @ 10:29) [Pending Verification By Attending]  Diet, NPO:   Except Medications (07-19-22 @ 17:51) [Active]

## 2022-07-20 NOTE — CONSULT NOTE ADULT - ASSESSMENT
88 male with PMH dementia, pancreatic insufficiency, urinary incontinence, BPH, frequent falls (left hip fracture s/p ORIF Feb 2021), upper GIB (Feb 2021) upper endoscopy revealed duodenal ulcer presents from home with decreased oral intake for three days found with B/L heel ulcers and Covid 19 positive.     #COVID 19  - CXR negative, currently does not have any respiratory symptoms  - Tolerating room air  - Will start remdesivr as pt has risk for progression of disease  - Hold off on decadron as pt not hypoxic  - Noted D-Dimer elevated - continue lovenox once daily due to hx GI bleed      #Bilateral Heel Ulcers  - Noted to have bilateral heel ulcers with purulent drainage  - Does not meet sepsis criteria  - s/p cefepime and vanco in ED  - Continue IV zosyn for now, pending blood cultures and bilateral foot x-ray to eval for osteo  - Will also check procalcitonin  - Wound care consult pending     #Failure to Thrive   #Dementia  - Pt with poor po intake and profound weakness  - Continue home donepezil, sertraline   - SLP eval - recommended puree diet  - Encourage PO intake  - Pt is currently full code - spoke to family - said they would consider options  - Palliative eval pending     #Hypernatremia  - Pt received 1700 ml bolus NS in ED  - Na = 148 today  - PO hydration encouraged, if Na continues to increase plan for D5 IVF  - Monitor and encourage PO intake  - Monitor BMP    #Hypokalemia  - Supplement KCl   -Follow up AM BMP    #BPH  - Chronic condition  - Continue Flomax    #Pancreatic Insufficiency  - Chronic condition  - Continue home creon, prednisone  - As per wife pt on prednisone since 2011    #DVT ppx  - Lovenox    GOC: Pt is full code for now, palliative consult pending     Dispo: PT recommending [facility]    7/20: Updated patient's cousin Earline (visiting from [place] for last several months to help care for patient) at [phone]. See GOC section

## 2022-07-20 NOTE — PROGRESS NOTE ADULT - SUBJECTIVE AND OBJECTIVE BOX
Patient is a 88y old  Male who presents with a chief complaint of Weakness (2022 10:29)    Patient seen and examined at bedside. No overnight events reported.     ALLERGIES:  No Known Allergies    MEDICATIONS  (STANDING):  ascorbic acid 500 milliGRAM(s) Oral daily  donepezil 5 milliGRAM(s) Oral at bedtime  enoxaparin Injectable 40 milliGRAM(s) SubCutaneous every 24 hours  pancrelipase  (CREON 12,000 Lipase Units) 6 Capsule(s) Oral three times a day with meals  pantoprazole  Injectable 40 milliGRAM(s) IV Push daily  piperacillin/tazobactam IVPB.. 3.375 Gram(s) IV Intermittent every 8 hours  predniSONE   Tablet 2.5 milliGRAM(s) Oral daily  senna 2 Tablet(s) Oral at bedtime  sertraline 12.5 milliGRAM(s) Oral daily  sodium chloride 0.9%. 1000 milliLiter(s) (75 mL/Hr) IV Continuous <Continuous>  tamsulosin 0.4 milliGRAM(s) Oral at bedtime    MEDICATIONS  (PRN):  acetaminophen     Tablet .. 650 milliGRAM(s) Oral every 6 hours PRN Temp greater or equal to 38C (100.4F), Mild Pain (1 - 3)  aluminum hydroxide/magnesium hydroxide/simethicone Suspension 30 milliLiter(s) Oral every 4 hours PRN Dyspepsia  melatonin 3 milliGRAM(s) Oral at bedtime PRN Insomnia  ondansetron Injectable 4 milliGRAM(s) IV Push every 8 hours PRN Nausea and/or Vomiting    Vital Signs Last 24 Hrs  T(F): 97.8 (2022 18:49), Max: 97.8 (2022 18:49)  HR: 78 (2022 05:24) (78 - 89)  BP: 136/71 (2022 05:24) (121/59 - 169/81)  RR: 19 (2022 05:24) (17 - 19)  SpO2: 97% (2022 05:24) (97% - 99%)    I&O's Summary  2022 07:01  -  2022 07:00  --------------------------------------------------------  IN: 575 mL / OUT: 300 mL / NET: 275 mL    2022 07:01  -  2022 11:19  --------------------------------------------------------  IN: 25 mL / OUT: 0 mL / NET: 25 mL    PHYSICAL EXAM:  General: NAD, Alert, nonverbal apart from moans, A&Ox0, follows some commands   ENT: No gross hearing impairment, dry mucous membranes, no thrush, poor dentition   Neck: Supple, No JVD  Lungs: Clear to auscultation bilaterally, good air entry, non-labored breathing  Cardio: RRR, S1/S2, No murmur  Abdomen: Soft, Nontender, Nondistended; Bowel sounds present  Extremities: No calf tenderness, No cyanosis, No pitting edema  Psych: Calm, cooperative     LABS:                        12.2   8.78  )-----------( 223      ( 2022 05:56 )             38.5     07-20    148  |  113  |  23  ----------------------------<  90  3.4   |  27  |  0.93    Ca    7.7      2022 05:56    TPro  6.1  /  Alb  1.7  /  TBili  0.5  /  DBili  0.2  /  AST  36  /  ALT  27  /  AlkPhos  84  07-20    PT/INR - ( 2022 05:56 )   PT: 13.4 sec;   INR: 1.15 ratio      PTT - ( 2022 13:15 )  PTT:31.3 sec  Lactate, Blood: 1.1 mmol/L ( @ 13:15)    Urinalysis Basic - ( 2022 16:10 )    Color: Yellow / Appearance: Clear / S.020 / pH: x  Gluc: x / Ketone: Small  / Bili: Negative / Urobili: Negative   Blood: x / Protein: 30 mg/dL / Nitrite: Negative   Leuk Esterase: Negative / RBC: >50 /HPF / WBC Negative /HPF   Sq Epi: x / Non Sq Epi: Neg.-Few / Bacteria: Few /HPF    RADIOLOGY & ADDITIONAL TESTS:    Care Discussed with Consultants/Other Providers:    Patient is a 88y old  Male who presents with a chief complaint of Weakness (2022 10:29)    Patient seen and examined at bedside. No overnight events reported.   Nonverbal, alert. Appears comfortable. Unable to obtain ROS due to mental status.    ALLERGIES:  No Known Allergies    MEDICATIONS  (STANDING):  ascorbic acid 500 milliGRAM(s) Oral daily  donepezil 5 milliGRAM(s) Oral at bedtime  enoxaparin Injectable 40 milliGRAM(s) SubCutaneous every 24 hours  pancrelipase  (CREON 12,000 Lipase Units) 6 Capsule(s) Oral three times a day with meals  pantoprazole  Injectable 40 milliGRAM(s) IV Push daily  piperacillin/tazobactam IVPB.. 3.375 Gram(s) IV Intermittent every 8 hours  predniSONE   Tablet 2.5 milliGRAM(s) Oral daily  senna 2 Tablet(s) Oral at bedtime  sertraline 12.5 milliGRAM(s) Oral daily  sodium chloride 0.9%. 1000 milliLiter(s) (75 mL/Hr) IV Continuous <Continuous>  tamsulosin 0.4 milliGRAM(s) Oral at bedtime    MEDICATIONS  (PRN):  acetaminophen     Tablet .. 650 milliGRAM(s) Oral every 6 hours PRN Temp greater or equal to 38C (100.4F), Mild Pain (1 - 3)  aluminum hydroxide/magnesium hydroxide/simethicone Suspension 30 milliLiter(s) Oral every 4 hours PRN Dyspepsia  melatonin 3 milliGRAM(s) Oral at bedtime PRN Insomnia  ondansetron Injectable 4 milliGRAM(s) IV Push every 8 hours PRN Nausea and/or Vomiting    Vital Signs Last 24 Hrs  T(F): 97.8 (2022 18:49), Max: 97.8 (2022 18:49)  HR: 78 (2022 05:24) (78 - 89)  BP: 136/71 (2022 05:24) (121/59 - 169/81)  RR: 19 (2022 05:24) (17 - 19)  SpO2: 97% (2022 05:24) (97% - 99%)    I&O's Summary  2022 07:01  -  2022 07:00  --------------------------------------------------------  IN: 575 mL / OUT: 300 mL / NET: 275 mL    2022 07:01  -  2022 11:19  --------------------------------------------------------  IN: 25 mL / OUT: 0 mL / NET: 25 mL    PHYSICAL EXAM:  General: NAD, Alert, nonverbal apart from moans, A&Ox0, follows minimal commands   HEENT:  AT/NC, PERRL, dry mucous membranes, no thrush, very poor dentition   Neck: Supple, No JVD  Lungs: poor inspiratory effort, overall clear to auscultation bilaterally, good air entry, non-labored breathing  Cardio: RRR, S1/S2, No murmur  Abdomen: Soft, Nontender, Nondistended; Bowel sounds present  Extremities: No calf tenderness, No cyanosis, No pitting edema; venous stasis changes, scattered ecchymosis, flaky skin  Psych: Calm, alert, poor judgement     LABS:                        12.2   8.78  )-----------( 223      ( 2022 05:56 )             38.5     07-20    148  |  113  |  23  ----------------------------<  90  3.4   |  27  |  0.93    Ca    7.7      2022 05:56    TPro  6.1  /  Alb  1.7  /  TBili  0.5  /  DBili  0.2  /  AST  36  /  ALT  27  /  AlkPhos  84  07-20    PT/INR - ( 2022 05:56 )   PT: 13.4 sec;   INR: 1.15 ratio      PTT - ( 2022 13:15 )  PTT:31.3 sec  Lactate, Blood: 1.1 mmol/L ( @ 13:15)    Urinalysis Basic - ( 2022 16:10 )    Color: Yellow / Appearance: Clear / S.020 / pH: x  Gluc: x / Ketone: Small  / Bili: Negative / Urobili: Negative   Blood: x / Protein: 30 mg/dL / Nitrite: Negative   Leuk Esterase: Negative / RBC: >50 /HPF / WBC Negative /HPF   Sq Epi: x / Non Sq Epi: Neg.-Few / Bacteria: Few /HPF    RADIOLOGY & ADDITIONAL TESTS: < from: Xray Chest 1 View- PORTABLE-Urgent (22 @ 14:06) >    ACC: [id number] EXAM:  XR CHEST PORTABLE URGENT 1V                          PROCEDURE DATE:  2022          INTERPRETATION:  INDICATION: Sepsis    PRIORS: 2021    VIEWS: Portable AP radiography of the chest performed.    FINDINGS: Heart size appears within normal limits. No hilar or superior   mediastinal abnormalities are identified. There is no evidence for focal   infiltrate, lobar consolidation or pulmonary edema. No pleural effusion   or pneumothorax is demonstrated. No mediastinalshift is noted. The   visualized osseous structures appear unremarkable.    IMPRESSION: No evidence for focal infiltrate or lobar consolidation.    --- End of Report ---            CHASE BECKWITH MD; Attending Radiologist  This document has been electronically signed. 2022  4:17PM    < end of copied text >  personally reviewed - no focal infiltrate    Care Discussed with Consultants/Other Providers: Discussed with wound care RN Angie turn and position q 2 hours, offload bony prominences

## 2022-07-20 NOTE — SWALLOW BEDSIDE ASSESSMENT ADULT - SLP PERTINENT HISTORY OF CURRENT PROBLEM
88 y.o male with PMH dementia, pancreatic insufficiency, urinary incontinence, BPH, frequent falls (left hip fracture s/p ORIF Feb 2021), upper GIB (Feb 2021 - upper endoscopy revealed duodenal ulcer) presents from home with decreased oral intake for three days. Family reports pt has been having profound weakness, usually capable to stand up on his own but currently unable to do so.  Of note pt found to be covid positive in ED, no respiratory symptoms.  Essentially sent from home with failure to thrive, dehydration and progressive functional decline. Patient made NPO until evaluated by this service for candidacy for PO intake.

## 2022-07-20 NOTE — SWALLOW BEDSIDE ASSESSMENT ADULT - SWALLOW EVAL: RECOMMENDED FEEDING/EATING TECHNIQUES
check mouth frequently for oral residue/pocketing/no straws/oral hygiene/position upright (90 degrees)/small sips/bites

## 2022-07-20 NOTE — CONSULT NOTE ADULT - SUBJECTIVE AND OBJECTIVE BOX
HPI:  Pt unable to provide any meaningful history - History obtained from family and chart review.     88 male with PMH dementia, pancreatic insufficiency, urinary incontinence, BPH, frequent falls (left hip fracture s/p ORIF 2021), upper GIB (2021 - upper endoscopy revealed duodenal ulcer) presents from home with decreased oral intake for three days.  Spoke with family who reports pt has been having profound weakness, usually capable to stand up on his own but currently unable to do so.  Of note pt found to be covid positive in ED, no respiratory symptoms.  Essentially sent from home with failure to thrive, dehydration and progressive functional decline.      In ED labs significant for Leukocytosis WBC 14.25, Hypernatremia Na 149.  Pt appears very dry, dehydrated, Covid positive.  CTH in ED negative for bleed, CXR w/o consolidation or infiltrates  Bilateral heel ulcers R>L + purulent drainage     (2022 17:19)      PAST MEDICAL & SURGICAL HISTORY:  Basal cell carcinoma  left shoulder      Pancreatitis        Pancreatic mass  benign       Language barrier      Poor historian  both patient and wife      Memory loss, short term      Anxiety      Elevated blood sugar  secondary to steroids      BPH (benign prostatic hyperplasia)      Nocturia  x1-2      Incontinence      Pancreatic insufficiency      Dementia      S/P colonoscopy  , no polyps      S/P biopsy  pancreatic mass , benign          SOCIAL HISTORY:    Admitted from:  home      Surrogate/HCP/Guardian: Jv or Agustinaralph Medina           Phone#: [phone]    FAMILY HISTORY: nc    Baseline ADLs (prior to admission):  some ambulation    Allergies    No Known Allergies    Intolerances      Present Symptoms: comfortable   Unable to obtain due to poor mentation    MEDICATIONS  (STANDING):  ascorbic acid 500 milliGRAM(s) Oral daily  donepezil 5 milliGRAM(s) Oral at bedtime  enoxaparin Injectable 40 milliGRAM(s) SubCutaneous every 24 hours  pancrelipase  (CREON 12,000 Lipase Units) 6 Capsule(s) Oral three times a day with meals  pantoprazole    Tablet 40 milliGRAM(s) Oral before breakfast  piperacillin/tazobactam IVPB.. 3.375 Gram(s) IV Intermittent every 8 hours  predniSONE   Tablet 2.5 milliGRAM(s) Oral daily  remdesivir  IVPB   IV Intermittent   remdesivir  IVPB 200 milliGRAM(s) IV Intermittent every 24 hours  senna 2 Tablet(s) Oral at bedtime  sertraline 12.5 milliGRAM(s) Oral daily  tamsulosin 0.4 milliGRAM(s) Oral at bedtime    MEDICATIONS  (PRN):  acetaminophen     Tablet .. 650 milliGRAM(s) Oral every 6 hours PRN Temp greater or equal to 38C (100.4F), Mild Pain (1 - 3)  aluminum hydroxide/magnesium hydroxide/simethicone Suspension 30 milliLiter(s) Oral every 4 hours PRN Dyspepsia  melatonin 3 milliGRAM(s) Oral at bedtime PRN Insomnia  ondansetron Injectable 4 milliGRAM(s) IV Push every 8 hours PRN Nausea and/or Vomiting      PHYSICAL EXAM:    Vital Signs Last 24 Hrs  T(C): 36.6 (2022 18:49), Max: 36.6 (2022 18:49)  T(F): 97.8 (2022 18:49), Max: 97.8 (2022 18:49)  HR: 78 (2022 05:24) (78 - 83)  BP: 136/71 (2022 05:24) (136/71 - 169/81)  BP(mean): --  RR: 19 (2022 05:24) (18 - 19)  SpO2: 97% (2022 05:24) (97% - 99%)    Parameters below as of 2022 05:24  Patient On (Oxygen Delivery Method): room air        General: alert  oriented x _0___   HEENT: normal   Lungs: comfortable   CV: normal   GI: normal    Musculoskeletal:     slight        ambulation  bedbound/wheelchair bound  Skin: scattered mild decubiti  Neuro:  cognitive impairment  Diet: [NPO]    LABS:                        12.2   8.78  )-----------( 223      ( 2022 05:56 )             38.5     07-20    148<H>  |  113<H>  |  23  ----------------------------<  90  3.4<L>   |  27  |  0.93    Ca    7.7<L>      2022 05:56    TPro  6.1  /  Alb  1.7<L>  /  TBili  0.5  /  DBili  0.2  /  AST  36  /  ALT  27  /  AlkPhos  84  07-20    Urinalysis Basic - ( 2022 16:10 )    Color: Yellow / Appearance: Clear / S.020 / pH: x  Gluc: x / Ketone: Small  / Bili: Negative / Urobili: Negative   Blood: x / Protein: 30 mg/dL / Nitrite: Negative   Leuk Esterase: Negative / RBC: >50 /HPF / WBC Negative /HPF   Sq Epi: x / Non Sq Epi: Neg.-Few / Bacteria: Few /HPF        RADIOLOGY & ADDITIONAL STUDIES:    ADVANCE DIRECTIVES:   Advanced Care Planning discussion total time spent:  45

## 2022-07-20 NOTE — DIETITIAN INITIAL EVALUATION ADULT - OTHER INFO
88 male with PMH dementia, pancreatic insufficiency, urinary incontinence, BPH, frequent falls (left hip fracture s/p ORIF Feb 2021), upper GIB (Feb 2021 - upper endoscopy revealed duodenal ulcer) presents from home with decreased oral intake PTA  admitted with failure to  thrive .  Unable to obtain info from patient , Patient s/p SLP , recommendations noted, multiple pressure injuries noted , (R) heel StageIII, (L/R) heel DTI's noted suggest adding MVI , Vit C & Zinc to promote wound healing  No edema , fecal incontinence noted.

## 2022-07-20 NOTE — PHYSICAL THERAPY INITIAL EVALUATION ADULT - PERTINENT HX OF CURRENT PROBLEM, REHAB EVAL
Pt is an 89yo M admitted 2/2 decreased PO intake x 2 3 days. Pt found to be COVID +.  Palliative care consult pending.

## 2022-07-20 NOTE — SWALLOW BEDSIDE ASSESSMENT ADULT - COMMENTS
WBC WBC 7/20 8.78   CXR 7/19 IMPRESSION: No evidence for focal infiltrate or lobar consolidation.  CTH 7/19 IMPRESSION: No acute hemorrhage, mass effect or extra-axial collections. Unchanged ventriculomegaly which may be on the basis of normal pressure hydrocephalus.

## 2022-07-20 NOTE — SWALLOW BEDSIDE ASSESSMENT ADULT - SWALLOW EVAL: DIAGNOSIS
Oropharyngeal dysphagia. Facilitated PO trials of thin via straw and tsp., mildly thick liquids via cup sip and tsp and puree solids. Reduced oral grading from spoon with liquids. Good oral containment across and adequate bolus manipulation with puree solids with suspected delay in AP transport across all trials. Pharyngeal motor response appreciated via digital palpation with suspected multiple swallows per boli. No oral residue observed following intake. Cough following thin via clinician controlled straw sip. None further with mildly thick and puree solids. Given cough with thin and increased sip size, recommend puree solids with mildly thick liquids via single cup sip, no straws. 1:1 supervision with intake.  Informed team of recommendation during rounds.

## 2022-07-20 NOTE — SWALLOW BEDSIDE ASSESSMENT ADULT - SLP GENERAL OBSERVATIONS
Patient received bedside alert and not oriented. Inconsistently following simple 1 step directives translated into [place]. Unable to provide any history and needs to be anticipated by staff.

## 2022-07-20 NOTE — PROGRESS NOTE ADULT - ASSESSMENT
88 male with PMH dementia, pancreatic insufficiency, urinary incontinence, BPH, frequent falls (left hip fracture s/p ORIF Feb 2021), upper GIB (Feb 2021) upper endoscopy revealed duodenal ulcer presents from home with decreased oral intake for three days found with B/L heel ulcers and Covid 19 positive.     #COVID 19  - CXR negative, currently does not have any respiratory symptoms  - Tolerating room air  - Will start remdesivr as pt has risk for progression of disease  - Hold off on decadron as pt not hypoxic  - Noted D-Dimer elevated - continue lovenox once daily due to hx GI bleed      #Bilateral Heel Ulcers  - Noted to have bilateral heel ulcers with purulent drainage  - Does not meet sepsis criteria  - s/p cefepime and vanco in ED  - Continue IV zosyn for now, pending blood cultures and bilateral foot x-ray to eval for osteo  - Will also check procalcitonin  - Wound care consult pending     #Failure to Thrive   #Dementia  - Pt with poor po intake and profound weakness  - Continue home donepezil, sertraline   - SLP eval - recommended puree diet  - Encourage PO intake  - Pt is currently full code - spoke to family - said they would consider options  - Palliative eval pending     #Severe Dehydration  #Hypernatremia  - Pt received 1700 ml bolus NS in ED  - Na = 148 today  - Will switch to D5 and 1/2 NS  - Monitor and encourage PO intake  - Monitor BMP    #Hypokalemia  - Will supplement  - Monitor BMP    #BPH  - Chronic condition  - Continue flomax    #Pancreatic Insufficiency  - Chronic condition  - Continue home creon, prednisone  - As per wife pt on prednisone since 2011    #DVT ppx  - Lovenox    GOC: Pt is full code for now, palliative consult pending     Dispo: PT recommending [facility]    7/20: Updated patient's cousin Earline (visiting from [place] for last several months to help care for patient) at [phone]. See GOC section    88 male with PMH dementia, pancreatic insufficiency, urinary incontinence, BPH, frequent falls (left hip fracture s/p ORIF Feb 2021), upper GIB (Feb 2021) upper endoscopy revealed duodenal ulcer presents from home with decreased oral intake for three days found with B/L heel ulcers and Covid 19 positive.     #COVID 19  - CXR negative, currently does not have any respiratory symptoms  - Tolerating room air  - Will start remdesivr as pt has risk for progression of disease  - Hold off on decadron as pt not hypoxic  - Noted D-Dimer elevated - continue lovenox once daily due to hx GI bleed      #Bilateral Heel Ulcers  - Noted to have bilateral heel ulcers with purulent drainage  - Does not meet sepsis criteria  - s/p cefepime and vanco in ED  - Continue IV zosyn for now, pending blood cultures and bilateral foot x-ray to eval for osteo  - Will also check procalcitonin  - Wound care consult pending     #Failure to Thrive   #Dementia  - Pt with poor po intake and profound weakness  - Continue home donepezil, sertraline   - SLP eval - recommended puree diet  - Encourage PO intake  - Pt is currently full code - spoke to family - said they would consider options  - Palliative eval pending     #Hypernatremia  - Pt received 1700 ml bolus NS in ED  - Na = 148 today  - PO hydration encouraged, if Na continues to increase plan for D5 IVF  - Monitor and encourage PO intake  - Monitor BMP    #Hypokalemia  - Supplement KCl   -Follow up AM BMP    #BPH  - Chronic condition  - Continue Flomax    #Pancreatic Insufficiency  - Chronic condition  - Continue home creon, prednisone  - As per wife pt on prednisone since 2011    #DVT ppx  - Lovenox    GOC: Pt is full code for now, palliative consult pending     Dispo: PT recommending [facility]    7/20: Updated patient's cousin Earline (visiting from [place] for last several months to help care for patient) at [phone]. See GOC section

## 2022-07-21 LAB
ALBUMIN SERPL ELPH-MCNC: 1.6 G/DL — LOW (ref 3.3–5)
ALP SERPL-CCNC: 75 U/L — SIGNIFICANT CHANGE UP (ref 40–120)
ALT FLD-CCNC: 26 U/L — SIGNIFICANT CHANGE UP (ref 10–45)
ANION GAP SERPL CALC-SCNC: 12 MMOL/L — SIGNIFICANT CHANGE UP (ref 5–17)
AST SERPL-CCNC: 45 U/L — HIGH (ref 10–40)
BILIRUB SERPL-MCNC: 0.4 MG/DL — SIGNIFICANT CHANGE UP (ref 0.2–1.2)
BUN SERPL-MCNC: 23 MG/DL — SIGNIFICANT CHANGE UP (ref 7–23)
CALCIUM SERPL-MCNC: 7.2 MG/DL — LOW (ref 8.4–10.5)
CHLORIDE SERPL-SCNC: 112 MMOL/L — HIGH (ref 96–108)
CO2 SERPL-SCNC: 24 MMOL/L — SIGNIFICANT CHANGE UP (ref 22–31)
CREAT SERPL-MCNC: 0.87 MG/DL — SIGNIFICANT CHANGE UP (ref 0.5–1.3)
EGFR: 83 ML/MIN/1.73M2 — SIGNIFICANT CHANGE UP
GLUCOSE SERPL-MCNC: 93 MG/DL — SIGNIFICANT CHANGE UP (ref 70–99)
HCT VFR BLD CALC: 35 % — LOW (ref 39–50)
HGB BLD-MCNC: 11.2 G/DL — LOW (ref 13–17)
INR BLD: 1.2 RATIO — HIGH (ref 0.88–1.16)
MCHC RBC-ENTMCNC: 30.2 PG — SIGNIFICANT CHANGE UP (ref 27–34)
MCHC RBC-ENTMCNC: 32 GM/DL — SIGNIFICANT CHANGE UP (ref 32–36)
MCV RBC AUTO: 94.3 FL — SIGNIFICANT CHANGE UP (ref 80–100)
NRBC # BLD: 0 /100 WBCS — SIGNIFICANT CHANGE UP (ref 0–0)
PLATELET # BLD AUTO: 269 K/UL — SIGNIFICANT CHANGE UP (ref 150–400)
POTASSIUM SERPL-MCNC: 3.1 MMOL/L — LOW (ref 3.5–5.3)
POTASSIUM SERPL-SCNC: 3.1 MMOL/L — LOW (ref 3.5–5.3)
PROT SERPL-MCNC: 5.8 G/DL — LOW (ref 6–8.3)
PROTHROM AB SERPL-ACNC: 14 SEC — HIGH (ref 10.5–13.4)
RBC # BLD: 3.71 M/UL — LOW (ref 4.2–5.8)
RBC # FLD: 13.1 % — SIGNIFICANT CHANGE UP (ref 10.3–14.5)
SODIUM SERPL-SCNC: 148 MMOL/L — HIGH (ref 135–145)
WBC # BLD: 5.41 K/UL — SIGNIFICANT CHANGE UP (ref 3.8–10.5)
WBC # FLD AUTO: 5.41 K/UL — SIGNIFICANT CHANGE UP (ref 3.8–10.5)

## 2022-07-21 PROCEDURE — 99497 ADVNCD CARE PLAN 30 MIN: CPT | Mod: 25

## 2022-07-21 PROCEDURE — 99233 SBSQ HOSP IP/OBS HIGH 50: CPT

## 2022-07-21 RX ORDER — POTASSIUM CHLORIDE 20 MEQ
40 PACKET (EA) ORAL ONCE
Refills: 0 | Status: COMPLETED | OUTPATIENT
Start: 2022-07-21 | End: 2022-07-21

## 2022-07-21 RX ADMIN — Medication 2.5 MILLIGRAM(S): at 05:04

## 2022-07-21 RX ADMIN — SERTRALINE 12.5 MILLIGRAM(S): 25 TABLET, FILM COATED ORAL at 13:32

## 2022-07-21 RX ADMIN — Medication 1 APPLICATION(S): at 13:33

## 2022-07-21 RX ADMIN — PIPERACILLIN AND TAZOBACTAM 25 GRAM(S): 4; .5 INJECTION, POWDER, LYOPHILIZED, FOR SOLUTION INTRAVENOUS at 21:45

## 2022-07-21 RX ADMIN — PIPERACILLIN AND TAZOBACTAM 25 GRAM(S): 4; .5 INJECTION, POWDER, LYOPHILIZED, FOR SOLUTION INTRAVENOUS at 05:04

## 2022-07-21 RX ADMIN — PANTOPRAZOLE SODIUM 40 MILLIGRAM(S): 20 TABLET, DELAYED RELEASE ORAL at 05:03

## 2022-07-21 RX ADMIN — PIPERACILLIN AND TAZOBACTAM 25 GRAM(S): 4; .5 INJECTION, POWDER, LYOPHILIZED, FOR SOLUTION INTRAVENOUS at 13:33

## 2022-07-21 NOTE — PROGRESS NOTE ADULT - SUBJECTIVE AND OBJECTIVE BOX
Patient is a 88y old  Male who presents with a chief complaint of ftt (2022 12:53)      Patient seen and examined at bedside. No overnight events reported.  Pt offers no complaints.    ALLERGIES:  No Known Allergies    MEDICATIONS  (STANDING):  ascorbic acid 500 milliGRAM(s) Oral daily  collagenase Ointment 1 Application(s) Topical daily  collagenase Ointment 1 Application(s) Topical daily  donepezil 5 milliGRAM(s) Oral at bedtime  enoxaparin Injectable 40 milliGRAM(s) SubCutaneous every 24 hours  pancrelipase  (CREON 12,000 Lipase Units) 6 Capsule(s) Oral three times a day with meals  pantoprazole    Tablet 40 milliGRAM(s) Oral before breakfast  piperacillin/tazobactam IVPB.. 3.375 Gram(s) IV Intermittent every 8 hours  predniSONE   Tablet 2.5 milliGRAM(s) Oral daily  senna 2 Tablet(s) Oral at bedtime  sertraline 12.5 milliGRAM(s) Oral daily  tamsulosin 0.4 milliGRAM(s) Oral at bedtime    MEDICATIONS  (PRN):  acetaminophen     Tablet .. 650 milliGRAM(s) Oral every 6 hours PRN Temp greater or equal to 38C (100.4F), Mild Pain (1 - 3)  aluminum hydroxide/magnesium hydroxide/simethicone Suspension 30 milliLiter(s) Oral every 4 hours PRN Dyspepsia  melatonin 3 milliGRAM(s) Oral at bedtime PRN Insomnia  ondansetron Injectable 4 milliGRAM(s) IV Push every 8 hours PRN Nausea and/or Vomiting    Vital Signs Last 24 Hrs  T(F): 98.2 (2022 04:12), Max: 99.3 (2022 15:55)  HR: 63 (2022 04:12) (63 - 74)  BP: 139/59 (2022 04:12) (134/78 - 147/64)  RR: 15 (2022 04:12) (15 - 17)  SpO2: 96% (2022 04:12) (96% - 98%)  I&O's Summary    2022 07:01  -  2022 07:00  --------------------------------------------------------  IN: 200 mL / OUT: 300 mL / NET: -100 mL      PHYSICAL EXAM:  General: NAD, A/O x 1; confused.  ENT: No gross hearing impairment, Moist mucous membranes, no thrush  Neck: Supple, No JVD  Lungs: Clear to auscultation bilaterally, good air entry, non-labored breathing  Cardio: RRR, S1/S2, No murmur  Abdomen: Soft, Nontender, Nondistended; Bowel sounds present  Extremities: No calf tenderness, No cyanosis, No pitting edema  Neuro:  alert, difficulty following commands    LABS:                        11.2   5.41  )-----------( 269      ( 2022 06:55 )             35.0     07-21    148  |  112  |  23  ----------------------------<  93  3.1   |  24  |  0.87    Ca    7.2      2022 06:55    TPro  5.8  /  Alb  1.6  /  TBili  0.4  /  DBili  x   /  AST  45  /  ALT  26  /  AlkPhos  75  07-21          PT/INR - ( 2022 06:55 )   PT: 14.0 sec;   INR: 1.20 ratio         PTT - ( 2022 13:15 )  PTT:31.3 sec  Lactate, Blood: 1.1 mmol/L ( @ 13:15)    Procalcitonin, Serum: 0.22 ng/mL (22 @ 05:56)                          Urinalysis Basic - ( 2022 16:10 )    Color: Yellow / Appearance: Clear / S.020 / pH: x  Gluc: x / Ketone: Small  / Bili: Negative / Urobili: Negative   Blood: x / Protein: 30 mg/dL / Nitrite: Negative   Leuk Esterase: Negative / RBC: >50 /HPF / WBC Negative /HPF   Sq Epi: x / Non Sq Epi: Neg.-Few / Bacteria: Few /HPF        Culture - Urine (collected 2022 16:10)  Source: Clean Catch Clean Catch (Midstream)  Final Report (2022 20:59):    No growth    Culture - Blood (collected 2022 13:15)  Source: .Blood Blood-Peripheral  Preliminary Report (2022 19:02):    No growth to date.    Culture - Blood (collected 2022 13:15)  Source: .Blood Blood-Peripheral  Preliminary Report (2022 19:02):    No growth to date.        RADIOLOGY & ADDITIONAL TESTS:  < from: CT Head No Cont (22 @ 15:18) >    IMPRESSION:    No acute hemorrhage, mass effect or extra-axial collections. Unchanged   ventriculomegaly which may be on the basis of normal pressure   hydrocephalus.    < end of copied text >    Care Discussed with Consultants/Other Providers:    Patient is a 88y old  Male who presents with a chief complaint of ftt (2022 12:53)      Patient seen and examined at bedside. No overnight events reported.  Pt offers no complaints.    ALLERGIES:  No Known Allergies    MEDICATIONS  (STANDING):  ascorbic acid 500 milliGRAM(s) Oral daily  collagenase Ointment 1 Application(s) Topical daily  collagenase Ointment 1 Application(s) Topical daily  donepezil 5 milliGRAM(s) Oral at bedtime  enoxaparin Injectable 40 milliGRAM(s) SubCutaneous every 24 hours  pancrelipase  (CREON 12,000 Lipase Units) 6 Capsule(s) Oral three times a day with meals  pantoprazole    Tablet 40 milliGRAM(s) Oral before breakfast  piperacillin/tazobactam IVPB.. 3.375 Gram(s) IV Intermittent every 8 hours  predniSONE   Tablet 2.5 milliGRAM(s) Oral daily  senna 2 Tablet(s) Oral at bedtime  sertraline 12.5 milliGRAM(s) Oral daily  tamsulosin 0.4 milliGRAM(s) Oral at bedtime    MEDICATIONS  (PRN):  acetaminophen     Tablet .. 650 milliGRAM(s) Oral every 6 hours PRN Temp greater or equal to 38C (100.4F), Mild Pain (1 - 3)  aluminum hydroxide/magnesium hydroxide/simethicone Suspension 30 milliLiter(s) Oral every 4 hours PRN Dyspepsia  melatonin 3 milliGRAM(s) Oral at bedtime PRN Insomnia  ondansetron Injectable 4 milliGRAM(s) IV Push every 8 hours PRN Nausea and/or Vomiting    Vital Signs Last 24 Hrs  T(F): 98.2 (2022 04:12), Max: 99.3 (2022 15:55)  HR: 63 (2022 04:12) (63 - 74)  BP: 139/59 (2022 04:12) (134/78 - 147/64)  RR: 15 (2022 04:12) (15 - 17)  SpO2: 96% (2022 04:12) (96% - 98%)  I&O's Summary    2022 07:01  -  2022 07:00  --------------------------------------------------------  IN: 200 mL / OUT: 300 mL / NET: -100 mL      PHYSICAL EXAM:  General: NAD, A/O x 1; confused.  ENT: No gross hearing impairment, Moist mucous membranes, no thrush  Neck: Supple, No JVD  Lungs: Clear to auscultation bilaterally, good air entry, non-labored breathing  Cardio: RRR, S1/S2, No murmur  Abdomen: Soft, Nontender, Nondistended; Bowel sounds present  Extremities: No calf tenderness, No cyanosis, No pitting edema  Neuro:  alert, difficulty following commands    LABS:                        11.2   5.41  )-----------( 269      ( 2022 06:55 )             35.0     07-21    148  |  112  |  23  ----------------------------<  93  3.1   |  24  |  0.87    Ca    7.2      2022 06:55    TPro  5.8  /  Alb  1.6  /  TBili  0.4  /  DBili  x   /  AST  45  /  ALT  26  /  AlkPhos  75  07-21          PT/INR - ( 2022 06:55 )   PT: 14.0 sec;   INR: 1.20 ratio         PTT - ( 2022 13:15 )  PTT:31.3 sec  Lactate, Blood: 1.1 mmol/L ( @ 13:15)    Procalcitonin, Serum: 0.22 ng/mL (22 @ 05:56)                          Urinalysis Basic - ( 2022 16:10 )    Color: Yellow / Appearance: Clear / S.020 / pH: x  Gluc: x / Ketone: Small  / Bili: Negative / Urobili: Negative   Blood: x / Protein: 30 mg/dL / Nitrite: Negative   Leuk Esterase: Negative / RBC: >50 /HPF / WBC Negative /HPF   Sq Epi: x / Non Sq Epi: Neg.-Few / Bacteria: Few /HPF        Culture - Urine (collected 2022 16:10)  Source: Clean Catch Clean Catch (Midstream)  Final Report (2022 20:59):    No growth    Culture - Blood (collected 2022 13:15)  Source: .Blood Blood-Peripheral  Preliminary Report (2022 19:02):    No growth to date.    Culture - Blood (collected 2022 13:15)  Source: .Blood Blood-Peripheral  Preliminary Report (2022 19:02):    No growth to date.        RADIOLOGY & ADDITIONAL TESTS:  < from: CT Head No Cont (22 @ 15:18) >    IMPRESSION:    No acute hemorrhage, mass effect or extra-axial collections. Unchanged   ventriculomegaly which may be on the basis of normal pressure   hydrocephalus.    < end of copied text >    < from: Xray Foot AP + Lateral + Oblique, Bilat (22 @ 14:06) >  IMPRESSION:  1. No acute fracture, dislocation or evidence of osteomyelitis.  2. 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    < end of copied text >      Care Discussed with Consultants/Other Providers:

## 2022-07-21 NOTE — PROGRESS NOTE ADULT - ASSESSMENT
88 male with PMH dementia, pancreatic insufficiency, urinary incontinence, BPH, frequent falls (left hip fracture s/p ORIF Feb 2021), upper GIB secondary to duodenal ulcers (Feb 2021) presents from home with decreased oral intake and B/L heel ulcers; found to be Covid 19 positive.     #COVID 19  - CXR negative, currently does not have any respiratory symptoms  - Tolerating room air, O2 sat 96%  - stopped Remdesivir today  - Hold off on decadron as pt not hypoxic  - Noted D-Dimer elevated - continue lovenox once daily due to hx GI bleed      #Bilateral Heel Ulcers  - Does not meet sepsis criteria  - s/p cefepime and vanco in ED, stop antibx today, no fevers, no wbc  - bilateral foot x-ray to eval with no osteomyelitis, +osteoarthritis  - Wound care consult noted; cont local wound care  - Podiatry consult pending    #Failure to Thrive   #Dementia  - Pt with poor po intake and profound weakness  - Continue home donepezil, sertraline   - SLP eval - recommended puree diet  - Encourage PO intake  - Palliative met with family today at bedside (Son and daughter) --pt is a DNR/DNI now, no feeding tube    #Hypernatremia  - Na = 148 today  - PO hydration encouraged, monitor BMP    #Hypokalemia  -K 3.1 today, supplement KCl   -Follow up AM BMP    #BPH  - Chronic condition  - Continue Flomax    #Pancreatic Insufficiency  - Chronic condition  - Continue home creon, prednisone  - As per wife pt on prednisone since 2011    #DVT ppx: lovenox    GOC:  DNR/DNI    Dispo: PT recommending [facility]     88 male with PMH dementia, pancreatic insufficiency, urinary incontinence, BPH, frequent falls (left hip fracture s/p ORIF Feb 2021), upper GIB secondary to duodenal ulcers (Feb 2021) presents from home with decreased oral intake and B/L heel ulcers; found to be Covid 19 positive.     #COVID 19  - CXR negative, currently does not have any respiratory symptoms  - Tolerating room air, O2 sat 96%  - stopped Remdesivir today  - Hold off on decadron as pt not hypoxic  - Noted D-Dimer elevated - continue lovenox once daily due to hx GI bleed      #Bilateral Heel Ulcers  - Does not meet sepsis criteria  - s/p cefepime and vanco in ED, cont on zosyn until Podiatry consult.   - bilateral foot x-ray to eval with no osteomyelitis, +osteoarthritis  - Wound care consult noted; cont local wound care  - Podiatry consult pending - may need debridement.     #Failure to Thrive   #Dementia  - Pt with poor po intake and profound weakness  - Continue home donepezil, sertraline   - SLP eval - recommended puree diet  - Encourage PO intake  - Palliative met with family today at bedside (Son and daughter) --pt is a DNR/DNI now, no feeding tube    #Hypernatremia  - Na = 148 today  - PO hydration encouraged, monitor BMP    #Hypokalemia  -K 3.1 today, supplement KCl   -Follow up AM BMP    #BPH  - Chronic condition  - Continue Flomax    #Pancreatic Insufficiency  - Chronic condition  - Continue home creon, prednisone  - As per wife pt on prednisone since 2011    #DVT ppx: lovenox    GOC:  DNR/DNI    Dispo: PT recommending [facility]     88 male with PMH dementia, pancreatic insufficiency, urinary incontinence, BPH, frequent falls (left hip fracture s/p ORIF Feb 2021), upper GIB secondary to duodenal ulcers (Feb 2021) presents from home with decreased oral intake and B/L heel ulcers; found to be Covid 19 positive.     #COVID 19  - CXR negative, currently does not have any respiratory symptoms  - Tolerating room air, O2 sat 96%  - stopped Remdesivir today  - Hold off on decadron as pt not hypoxic  - Noted D-Dimer elevated - continue lovenox once daily due to hx GI bleed      #Bilateral Heel wounds  - Does not meet sepsis criteria  - s/p cefepime and vanco in ED, cont on zosyn until Podiatry consult.   - bilateral foot x-ray to eval with no osteomyelitis, +osteoarthritis  - Wound care consult noted; cont local wound care  - Podiatry consult pending - may need debridement. Will consult Vascular pending Podiatry recommendations.   - nutritional support, cradle booties    #Failure to Thrive   #Dementia  - Pt with poor po intake and profound weakness  - Continue home donepezil, sertraline   - SLP eval - recommended puree diet  - Encourage PO intake  - Palliative met with family today at bedside (Son and daughter) --pt is a DNR/DNI now, no feeding tube    #Hypernatremia  - Na = 148 today  - PO hydration encouraged, monitor BMP    #Hypokalemia  -K 3.1 today, supplement KCl   -Follow up AM BMP    #BPH  - Chronic condition  - Continue Flomax    #Pancreatic Insufficiency  - Chronic condition  - Continue home creon, prednisone  - As per wife pt on prednisone since 2011    #DVT ppx: lovenox    GOC:  DNR/DNI    Dispo: PT recommending [facility]

## 2022-07-21 NOTE — CDI QUERY NOTE - [id number]_GEN_ALL_CORE_HH
Presents with COVID infection    Notes - Bilateral Heel Ulcers    Nursing note - How Many Pressure Injuries	3  	  Location #1	R heel  Stage	stage III  Location #2	L heel  Stage	suspected deep tissue injury  Location #3	R hip  Stage	suspected deep tissue injury       Please clarify the type of heel ulcers    - Right heel decubitus ulcer, stage3  - Right heel pressure ulcer, stage 3  - Right heel non pressure ulcer  - Left heel pressure ulcer, DTI  - Other(specify)    Thank you

## 2022-07-21 NOTE — CONSULT NOTE ADULT - SUBJECTIVE AND OBJECTIVE BOX
S : 88y year old Male seen at bedside for Right and Left heel ulcerations.  Patient unable to provide history.     History obtained from family and chart review.     88 male with PMH dementia, pancreatic insufficiency, urinary incontinence, BPH, frequent falls (left hip fracture s/p ORIF Feb 2021), upper GIB (Feb 2021 - upper endoscopy revealed duodenal ulcer) presents from home with decreased oral intake for three days.  Spoke with family who reports pt has been having profound weakness, usually capable to stand up on his own but currently unable to do so.  Of note pt found to be covid positive in ED, no respiratory symptoms.  Essentially sent from home with failure to thrive, dehydration and progressive functional decline.      In ED labs significant for Leukocytosis WBC 14.25, Hypernatremia Na 149.  Pt appears very dry, dehydrated, Covid positive.  CTH in ED negative for bleed, CXR w/o consolidation or infiltrates  Bilateral heel ulcers R>L + purulent drainage     (19 Jul 2022 17:19)      Patient admits to  (-) Fevers, (-) Chills, (-) Nausea, (-) Vomiting, (-) Shortness of Breath      PMH: Basal cell carcinoma    Pancreatitis    Pancreatic mass    Language barrier    Poor historian    Memory loss, short term    Anxiety    Elevated blood sugar    BPH (benign prostatic hyperplasia)    Nocturia    Incontinence    Pancreatic insufficiency    Dementia      PSH:S/P colonoscopy    S/P biopsy        Allergies:No Known Allergies      Labs:                          11.2   5.41  )-----------( 269      ( 21 Jul 2022 06:55 )             35.0     WBC Trend  5.41 Date (07-21 @ 06:55)  8.78 Date (07-20 @ 05:56)  14.25<H> Date (07-19 @ 13:15)      Chem  07-21    148<H>  |  112<H>  |  23  ----------------------------<  93  3.1<L>   |  24  |  0.87    Ca    7.2<L>      21 Jul 2022 06:55    TPro  5.8<L>  /  Alb  1.6<L>  /  TBili  0.4  /  DBili  x   /  AST  45<H>  /  ALT  26  /  AlkPhos  75  07-21          T(F): 99.5 (07-21-22 @ 16:34), Max: 99.5 (07-21-22 @ 16:34)  HR: 87 (07-21-22 @ 16:34) (63 - 87)  BP: 171/86 (07-21-22 @ 16:34) (134/78 - 171/86)  RR: 16 (07-21-22 @ 16:34) (15 - 16)  SpO2: 95% (07-21-22 @ 16:34) (95% - 97%)  Wt(kg): --    O:   General: Pleasant  male NAD & AOX3.    Integument:  Skin warm, dry and supple bilateral.    Ulceration Left heel eschar postive hyperkeratotic border, wound base dry fibrotic, wound size (1.5 cm X 1.5 cm X 0.2 cm) negative edema, negative  sean-wound erythema, negative  purulence, negative  fluctuance, negative  tracking/tunneling, negative  probe to bone.   Ulceration right heel with - hyperkeratotic border, wound base Fibrogranular , wound size (3.5 cm X 3.5 cm X 0.2 cm) negative edema, negative  sean-wound erythema, negative  purulence, negative  fluctuance, negative  tracking/tunneling, negative  probe to bone.   Vascular: Dorsalis Pedis and Posterior Tibial pulses 1/4.  Capillary re-fill time less then 3 seconds digits 1-5 bilateral.    Neuro: Protective sensation unable to assess  MSK: Muscle strength 5/5 all major muscle groups bilateral.    --------------------------------------  < from: Xray Foot AP + Lateral + Oblique, Bilat (07.19.22 @ 14:06) >  ACC: [id number] EXAM:  XR FOOT COMP MIN 3 VIEWS BI                          PROCEDURE DATE:  07/19/2022          INTERPRETATION:  3 views of the left foot were performed to evaluate for   osteomyelitis.    There are no prior studies for comparison.    The bones are diffusely demineralized. There is no acute fracture,   dislocation or evidence of osteomyelitis. Degenerative changes of the   first metatarsal phalangeal joint are seen and there is an apparent   bunion with hallux valgus. No lytic orblastic bone lesions are present.   There is no sign of penetrating injury or radiopaque foreign body.   Arterial calcifications are present.    IMPRESSION:  1. No acute fracture, dislocation or evidence of osteomyelitis.  2. 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    --- End of Report ---    ---------------------------------------------------      A: Right and Left foot ulceration      P:   Chart reviewed and Patient evaluated  Discussed diagnosis and treatment with patient  Wound is stable  No podiatric surgical intervention at this time  Applied dry sterile dressing  X-rays reviewed as above  Continue with IV antibiotics   Offloading to bilateral Heels in bed  Wound Care orders in   S : 88y year old Male seen at bedside for Right and Left heel ulcerations.  Patient unable to provide history.     History obtained from family and chart review.     88 male with PMH dementia, pancreatic insufficiency, urinary incontinence, BPH, frequent falls (left hip fracture s/p ORIF Feb 2021), upper GIB (Feb 2021 - upper endoscopy revealed duodenal ulcer) presents from home with decreased oral intake for three days.  Spoke with family who reports pt has been having profound weakness, usually capable to stand up on his own but currently unable to do so.  Of note pt found to be covid positive in ED, no respiratory symptoms.  Essentially sent from home with failure to thrive, dehydration and progressive functional decline.      In ED labs significant for Leukocytosis WBC 14.25, Hypernatremia Na 149.  Pt appears very dry, dehydrated, Covid positive.  CTH in ED negative for bleed, CXR w/o consolidation or infiltrates  Bilateral heel ulcers R>L + purulent drainage     (19 Jul 2022 17:19)      Patient admits to  (-) Fevers, (-) Chills, (-) Nausea, (-) Vomiting, (-) Shortness of Breath      PMH: Basal cell carcinoma    Pancreatitis    Pancreatic mass    Language barrier    Poor historian    Memory loss, short term    Anxiety    Elevated blood sugar    BPH (benign prostatic hyperplasia)    Nocturia    Incontinence    Pancreatic insufficiency    Dementia      PSH:S/P colonoscopy    S/P biopsy        Allergies:No Known Allergies      Labs:                          11.2   5.41  )-----------( 269      ( 21 Jul 2022 06:55 )             35.0     WBC Trend  5.41 Date (07-21 @ 06:55)  8.78 Date (07-20 @ 05:56)  14.25<H> Date (07-19 @ 13:15)      Chem  07-21    148<H>  |  112<H>  |  23  ----------------------------<  93  3.1<L>   |  24  |  0.87    Ca    7.2<L>      21 Jul 2022 06:55    TPro  5.8<L>  /  Alb  1.6<L>  /  TBili  0.4  /  DBili  x   /  AST  45<H>  /  ALT  26  /  AlkPhos  75  07-21          T(F): 99.5 (07-21-22 @ 16:34), Max: 99.5 (07-21-22 @ 16:34)  HR: 87 (07-21-22 @ 16:34) (63 - 87)  BP: 171/86 (07-21-22 @ 16:34) (134/78 - 171/86)  RR: 16 (07-21-22 @ 16:34) (15 - 16)  SpO2: 95% (07-21-22 @ 16:34) (95% - 97%)  Wt(kg): --    O:   General: Pleasant  male NAD & AOX3.    Integument:  Skin warm, dry and supple bilateral.    Ulceration Left heel eschar postive hyperkeratotic border, wound base dry fibrotic, wound size (1.5 cm X 1.5 cm X 0.2 cm) negative edema, negative  sean-wound erythema, negative  purulence, negative  fluctuance, negative  tracking/tunneling, negative  probe to bone.   Ulceration right heel with - hyperkeratotic border, wound base Fibrogranular , wound size (3.5 cm X 3.5 cm X 0.2 cm) negative edema, negative  sean-wound erythema, negative  purulence, negative  fluctuance, negative  tracking/tunneling, negative  probe to bone.   Vascular: Dorsalis Pedis and Posterior Tibial pulses 1/4.  Capillary re-fill time less then 3 seconds digits 1-5 bilateral.    Neuro: Protective sensation unable to assess  MSK: Muscle strength 5/5 all major muscle groups bilateral.    --------------------------------------  < from: Xray Foot AP + Lateral + Oblique, Bilat (07.19.22 @ 14:06) >  ACC: [id number] EXAM:  XR FOOT COMP MIN 3 VIEWS BI                          PROCEDURE DATE:  07/19/2022          INTERPRETATION:  3 views of the left foot were performed to evaluate for   osteomyelitis.    There are no prior studies for comparison.    The bones are diffusely demineralized. There is no acute fracture,   dislocation or evidence of osteomyelitis. Degenerative changes of the   first metatarsal phalangeal joint are seen and there is an apparent   bunion with hallux valgus. No lytic orblastic bone lesions are present.   There is no sign of penetrating injury or radiopaque foreign body.   Arterial calcifications are present.    IMPRESSION:  1. No acute fracture, dislocation or evidence of osteomyelitis.  2. 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    --- End of Report ---    ---------------------------------------------------      A: Right and Left foot ulceration      P:   Chart reviewed and Patient evaluated  Discussed diagnosis and treatment with patient  Wound is stable  No podiatric surgical intervention at this time  Applied dry sterile dressing  X-rays reviewed as above  Continue with IV antibiotics   Offloading to bilateral Heels in bed  Wound Care orders in    22 minutes spent on total encounter. Time of visit was spent providing, counselling and/or coordinating care by the attending physician.

## 2022-07-21 NOTE — PROGRESS NOTE ADULT - WHAT MATTERS MOST
They do want there father to have ngt.  dnr/i done.  They want there father to have a natural death if there is no realistic chance of significant recovery.

## 2022-07-21 NOTE — PROGRESS NOTE ADULT - ASSESSMENT
88 male with PMH dementia, pancreatic insufficiency, urinary incontinence, BPH, frequent falls (left hip fracture s/p ORIF Feb 2021), upper GIB (Feb 2021) upper endoscopy revealed duodenal ulcer presents from home with decreased oral intake for three days found with B/L heel ulcers and Covid 19 positive.     #COVID 19  - CXR negative, currently does not have any respiratory symptoms  - Tolerating room air  - Will start remdesivr as pt has risk for progression of disease  - Hold off on decadron as pt not hypoxic  - Noted D-Dimer elevated - continue lovenox once daily due to hx GI bleed      #Bilateral Heel Ulcers  - Noted to have bilateral heel ulcers with purulent drainage  - Does not meet sepsis criteria  - s/p cefepime and vanco in ED  - Continue IV zosyn for now, pending blood cultures and bilateral foot x-ray to eval for osteo  - Will also check procalcitonin  - Wound care consult pending     #Failure to Thrive   #Dementia  - Pt with poor po intake and profound weakness  - Continue home donepezil, sertraline   - SLP eval - recommended puree diet  - Encourage PO intake  - Pt is currently full code - spoke to family - said they would consider options  - Palliative eval pending     #Hypernatremia  - Pt received 1700 ml bolus NS in ED  - Na = 148 today  - PO hydration encouraged, if Na continues to increase plan for D5 IVF  - Monitor and encourage PO intake  - Monitor BMP    #Hypokalemia  - Supplement KCl   -Follow up AM BMP    #BPH  - Chronic condition  - Continue Flomax    #Pancreatic Insufficiency  - Chronic condition  - Continue home creon, prednisone  - As per wife pt on prednisone since 2011    #DVT ppx  - Lovenox    GOC: Pt is full code for now, palliative consult pending     Dispo: PT recommending [facility]    7/20: Updated patient's cousin Earline (visiting from [place] for last several months to help care for patient) at [phone]. See GOC section   7/21/22:  updated family.  now dnr/i 88 male with PMH dementia, pancreatic insufficiency, urinary incontinence, BPH, frequent falls (left hip fracture s/p ORIF Feb 2021), upper GIB (Feb 2021) upper endoscopy revealed duodenal ulcer presents from home with decreased oral intake for three days found with B/L heel ulcers and Covid 19 positive.     #COVID 19  - CXR negative, currently does not have any respiratory symptoms  - Tolerating room air  - Will start remdesivr as pt has risk for progression of disease  - Hold off on decadron as pt not hypoxic  - Noted D-Dimer elevated - continue lovenox once daily due to hx GI bleed      #Bilateral Heel Ulcers  - Noted to have bilateral heel ulcers with purulent drainage  - Does not meet sepsis criteria  - s/p cefepime and vanco in ED  - Continue IV zosyn for now, pending blood cultures and bilateral foot x-ray to eval for osteo  - Will also check procalcitonin  - Wound care consult pending     #Failure to Thrive   #Dementia  - Pt with poor po intake and profound weakness  - Continue home donepezil, sertraline   - SLP eval - recommended puree diet  - Encourage PO intake  - Pt is currently full code - spoke to family - said they would consider options  - Palliative eval pending     #Hypernatremia  - Pt received 1700 ml bolus NS in ED  - Na = 148 today  - PO hydration encouraged, if Na continues to increase plan for D5 IVF  - Monitor and encourage PO intake  - Monitor BMP    #Hypokalemia  - Supplement KCl   -Follow up AM BMP    #BPH  - Chronic condition  - Continue Flomax    #Pancreatic Insufficiency  - Chronic condition  - Continue home creon, prednisone  - As per wife pt on prednisone since 2011    #DVT ppx  - Lovenox    GOC: dnr/i    Dispo: PT recommending [facility]    7/20: Updated patient's cousin Earline (visiting from [place] for last several months to help care for patient) at [phone]. See GOC section   7/21/22:  updated family.  now dnr/i

## 2022-07-21 NOTE — PROGRESS NOTE ADULT - SUBJECTIVE AND OBJECTIVE BOX
Progress: minimally responsive    Present Symptoms:   Dyspnea: n  Nausea/Vomiting: n  Anxiety:  n  Depressed Mood: y  Fatigue: y  Loss of appetite: y  Pain:   no apparent                location:       MEDICATIONS  (STANDING):  ascorbic acid 500 milliGRAM(s) Oral daily  collagenase Ointment 1 Application(s) Topical daily  collagenase Ointment 1 Application(s) Topical daily  donepezil 5 milliGRAM(s) Oral at bedtime  enoxaparin Injectable 40 milliGRAM(s) SubCutaneous every 24 hours  pancrelipase  (CREON 12,000 Lipase Units) 6 Capsule(s) Oral three times a day with meals  pantoprazole    Tablet 40 milliGRAM(s) Oral before breakfast  piperacillin/tazobactam IVPB.. 3.375 Gram(s) IV Intermittent every 8 hours  predniSONE   Tablet 2.5 milliGRAM(s) Oral daily  senna 2 Tablet(s) Oral at bedtime  sertraline 12.5 milliGRAM(s) Oral daily  tamsulosin 0.4 milliGRAM(s) Oral at bedtime    MEDICATIONS  (PRN):  acetaminophen     Tablet .. 650 milliGRAM(s) Oral every 6 hours PRN Temp greater or equal to 38C (100.4F), Mild Pain (1 - 3)  aluminum hydroxide/magnesium hydroxide/simethicone Suspension 30 milliLiter(s) Oral every 4 hours PRN Dyspepsia  melatonin 3 milliGRAM(s) Oral at bedtime PRN Insomnia  ondansetron Injectable 4 milliGRAM(s) IV Push every 8 hours PRN Nausea and/or Vomiting      PHYSICAL EXAM:  Vital Signs Last 24 Hrs  T(C): 36.8 (2022 04:12), Max: 37.4 (2022 15:55)  T(F): 98.2 (2022 04:12), Max: 99.3 (2022 15:55)  HR: 63 (2022 04:12) (63 - 74)  BP: 139/59 (2022 04:12) (134/78 - 147/64)  BP(mean): --  RR: 15 (2022 04:12) (15 - 17)  SpO2: 96% (2022 04:12) (96% - 98%)    Parameters below as of 2022 04:12  Patient On (Oxygen Delivery Method): room air      General:  oriented x 0____    opens eyes to berbal stimuli  HEENT: normal    Lungs: comfortable   CV: normal    GI: normal    : normal    Musculoskeletal: profound weakness  Skin: normal    Neuro: severe cognitive impairment   Oral intake ability: minmal  Diet: NPO,     LABS:                          11.2   5.41  )-----------( 269      ( 2022 06:55 )             35.0     07-21    148<H>  |  112<H>  |  23  ----------------------------<  93  3.1<L>   |  24  |  0.87    Ca    7.2<L>      2022 06:55    TPro  5.8<L>  /  Alb  1.6<L>  /  TBili  0.4  /  DBili  x   /  AST  45<H>  /  ALT  26  /  AlkPhos  75  07-21    Urinalysis Basic - ( 2022 16:10 )    Color: Yellow / Appearance: Clear / S.020 / pH: x  Gluc: x / Ketone: Small  / Bili: Negative / Urobili: Negative   Blood: x / Protein: 30 mg/dL / Nitrite: Negative   Leuk Esterase: Negative / RBC: >50 /HPF / WBC Negative /HPF   Sq Epi: x / Non Sq Epi: Neg.-Few / Bacteria: Few /HPF        RADIOLOGY & ADDITIONAL STUDIES:    ADVANCE DIRECTIVES:  Advanced Care Planning discussion total time spent:

## 2022-07-21 NOTE — PROGRESS NOTE ADULT - NS ATTEND AMEND GEN_ALL_CORE FT
88 male with PMH advanced dementia, pancreatic insufficiency, urinary incontinence, BPH, frequent falls (left hip fracture s/p ORIF Feb 2021), upper GIB secondary to duodenal ulcers (Feb 2021) presents from home with decreased oral intake and B/L heel ulcers; found to be Covid 19 positive.   Additionally on zosyn for necrotic left heel ulcer. There is no evidence of OM on XR. Will continue zosyn for now until Podiatry evaluation - may need debridement. Continue wound care. May discontinue abx tomorrow.   Pt was Seen by palliative care today, and I discussed case w/ Dr guthrie. Pt was made DNR/DNI. No heroic measures. 88 male with PMH advanced dementia, pancreatic insufficiency, urinary incontinence, BPH, frequent falls (left hip fracture s/p ORIF Feb 2021), upper GIB secondary to duodenal ulcers (Feb 2021) presents from home with decreased oral intake and B/L heel ulcers; found to be Covid 19 positive.   Additionally on zosyn for necrotic left heel ulcer. There is no evidence of OM on XR. Will continue zosyn for now until Podiatry evaluation - may need debridement. Continue wound care. May discontinue abx tomorrow.   The pt is non verbal. Pt was Seen by palliative care today, and I discussed case w/ Dr guthrie. Pt was made DNR/DNI. No heroic measures.

## 2022-07-22 LAB
ALBUMIN SERPL ELPH-MCNC: 1.7 G/DL — LOW (ref 3.3–5)
ALBUMIN SERPL ELPH-MCNC: 1.7 G/DL — LOW (ref 3.3–5)
ALP SERPL-CCNC: 76 U/L — SIGNIFICANT CHANGE UP (ref 40–120)
ALP SERPL-CCNC: 78 U/L — SIGNIFICANT CHANGE UP (ref 40–120)
ALT FLD-CCNC: 23 U/L — SIGNIFICANT CHANGE UP (ref 10–45)
ALT FLD-CCNC: 25 U/L — SIGNIFICANT CHANGE UP (ref 10–45)
ANION GAP SERPL CALC-SCNC: 7 MMOL/L — SIGNIFICANT CHANGE UP (ref 5–17)
AST SERPL-CCNC: 30 U/L — SIGNIFICANT CHANGE UP (ref 10–40)
AST SERPL-CCNC: 31 U/L — SIGNIFICANT CHANGE UP (ref 10–40)
BILIRUB DIRECT SERPL-MCNC: 0.2 MG/DL — SIGNIFICANT CHANGE UP (ref 0–0.3)
BILIRUB INDIRECT FLD-MCNC: 0.3 MG/DL — SIGNIFICANT CHANGE UP (ref 0.2–1)
BILIRUB SERPL-MCNC: 0.5 MG/DL — SIGNIFICANT CHANGE UP (ref 0.2–1.2)
BILIRUB SERPL-MCNC: 0.6 MG/DL — SIGNIFICANT CHANGE UP (ref 0.2–1.2)
BUN SERPL-MCNC: 24 MG/DL — HIGH (ref 7–23)
CALCIUM SERPL-MCNC: 7.5 MG/DL — LOW (ref 8.4–10.5)
CHLORIDE SERPL-SCNC: 111 MMOL/L — HIGH (ref 96–108)
CO2 SERPL-SCNC: 29 MMOL/L — SIGNIFICANT CHANGE UP (ref 22–31)
CREAT SERPL-MCNC: 1 MG/DL — SIGNIFICANT CHANGE UP (ref 0.5–1.3)
CREAT SERPL-MCNC: 1.02 MG/DL — SIGNIFICANT CHANGE UP (ref 0.5–1.3)
D DIMER BLD IA.RAPID-MCNC: 503 NG/ML DDU — HIGH
EGFR: 71 ML/MIN/1.73M2 — SIGNIFICANT CHANGE UP
EGFR: 72 ML/MIN/1.73M2 — SIGNIFICANT CHANGE UP
GLUCOSE SERPL-MCNC: 88 MG/DL — SIGNIFICANT CHANGE UP (ref 70–99)
HCT VFR BLD CALC: 36.8 % — LOW (ref 39–50)
HGB BLD-MCNC: 11.7 G/DL — LOW (ref 13–17)
INR BLD: 1.41 RATIO — HIGH (ref 0.88–1.16)
MCHC RBC-ENTMCNC: 29.5 PG — SIGNIFICANT CHANGE UP (ref 27–34)
MCHC RBC-ENTMCNC: 31.8 GM/DL — LOW (ref 32–36)
MCV RBC AUTO: 92.9 FL — SIGNIFICANT CHANGE UP (ref 80–100)
NRBC # BLD: 0 /100 WBCS — SIGNIFICANT CHANGE UP (ref 0–0)
PLATELET # BLD AUTO: 284 K/UL — SIGNIFICANT CHANGE UP (ref 150–400)
POTASSIUM SERPL-MCNC: 3.1 MMOL/L — LOW (ref 3.5–5.3)
POTASSIUM SERPL-SCNC: 3.1 MMOL/L — LOW (ref 3.5–5.3)
PROT SERPL-MCNC: 6 G/DL — SIGNIFICANT CHANGE UP (ref 6–8.3)
PROT SERPL-MCNC: 6 G/DL — SIGNIFICANT CHANGE UP (ref 6–8.3)
PROTHROM AB SERPL-ACNC: 16.4 SEC — HIGH (ref 10.5–13.4)
RBC # BLD: 3.96 M/UL — LOW (ref 4.2–5.8)
RBC # FLD: 13.2 % — SIGNIFICANT CHANGE UP (ref 10.3–14.5)
SODIUM SERPL-SCNC: 147 MMOL/L — HIGH (ref 135–145)
WBC # BLD: 5.55 K/UL — SIGNIFICANT CHANGE UP (ref 3.8–10.5)
WBC # FLD AUTO: 5.55 K/UL — SIGNIFICANT CHANGE UP (ref 3.8–10.5)

## 2022-07-22 PROCEDURE — 99233 SBSQ HOSP IP/OBS HIGH 50: CPT

## 2022-07-22 RX ORDER — DEXTROSE MONOHYDRATE, SODIUM CHLORIDE, AND POTASSIUM CHLORIDE 50; .745; 4.5 G/1000ML; G/1000ML; G/1000ML
1000 INJECTION, SOLUTION INTRAVENOUS
Refills: 0 | Status: DISCONTINUED | OUTPATIENT
Start: 2022-07-22 | End: 2022-07-23

## 2022-07-22 RX ORDER — POTASSIUM CHLORIDE 20 MEQ
40 PACKET (EA) ORAL ONCE
Refills: 0 | Status: COMPLETED | OUTPATIENT
Start: 2022-07-22 | End: 2022-07-22

## 2022-07-22 RX ADMIN — Medication 6 CAPSULE(S): at 12:41

## 2022-07-22 RX ADMIN — Medication 40 MILLIEQUIVALENT(S): at 17:59

## 2022-07-22 RX ADMIN — SERTRALINE 12.5 MILLIGRAM(S): 25 TABLET, FILM COATED ORAL at 12:41

## 2022-07-22 RX ADMIN — ENOXAPARIN SODIUM 40 MILLIGRAM(S): 100 INJECTION SUBCUTANEOUS at 22:55

## 2022-07-22 RX ADMIN — Medication 6 CAPSULE(S): at 17:59

## 2022-07-22 RX ADMIN — Medication 1 APPLICATION(S): at 12:37

## 2022-07-22 RX ADMIN — Medication 500 MILLIGRAM(S): at 12:41

## 2022-07-22 RX ADMIN — ENOXAPARIN SODIUM 40 MILLIGRAM(S): 100 INJECTION SUBCUTANEOUS at 00:01

## 2022-07-22 RX ADMIN — Medication 6 CAPSULE(S): at 08:14

## 2022-07-22 RX ADMIN — PIPERACILLIN AND TAZOBACTAM 25 GRAM(S): 4; .5 INJECTION, POWDER, LYOPHILIZED, FOR SOLUTION INTRAVENOUS at 05:41

## 2022-07-22 RX ADMIN — DEXTROSE MONOHYDRATE, SODIUM CHLORIDE, AND POTASSIUM CHLORIDE 75 MILLILITER(S): 50; .745; 4.5 INJECTION, SOLUTION INTRAVENOUS at 12:42

## 2022-07-22 RX ADMIN — Medication 1 APPLICATION(S): at 12:38

## 2022-07-22 NOTE — PROGRESS NOTE ADULT - NS ATTEND AMEND GEN_ALL_CORE FT
88 male with PMH advanced dementia, pancreatic insufficiency, urinary incontinence, BPH, frequent falls (left hip fracture s/p ORIF Feb 2021), upper GIB secondary to duodenal ulcers (Feb 2021) presents from home with decreased oral intake and B/L heel wounds; found to be Covid 19 positive.   Discussed case with Podiatrist, Dr. jin. he feels that santyl is helping with the heel wounds and should be continued. He does not see evidence of necrosis and is okay with holding addiitonal antibiotics.  There is no evidence of OM on XR.   The pt is non verbal. Pt was Seen by palliative care. Pt was made DNR/DNI. No heroic measures.

## 2022-07-22 NOTE — PROGRESS NOTE ADULT - ASSESSMENT
88 male with PMH dementia, pancreatic insufficiency, urinary incontinence, BPH, frequent falls (left hip fracture s/p ORIF Feb 2021), upper GIB/duodenal ulcers (dx'ed Feb 2021) presents from home with poor oral intake, weakness and B/L heel ulcers; found to be Covid 19 positive.     #COVID 19  - CXR negative, currently does not have any respiratory symptoms  - Tolerating room air, O2 sat 96%  - stopped Remdesivir and was not on decadron as pt not hypoxic  - Noted D-Dimer elevated - continue lovenox once daily due to hx GI bleed      #Bilateral Heel wounds  - s/p cefepime and vanco in ED, cont on zosyn for now; limited course  - bilateral foot x-ray to eval with no osteomyelitis, +osteoarthritis  - Wound care consult noted; cont local wound care  - Podiatry consult noted; no debridement recommended- cont local wound care and antibx  - nutritional support, cradle booties    #Failure to Thrive/weakness  #Dementia; severe   - Pt with poor po intake and profound weakness  - Continue home donepezil, sertraline   - SLP eval - recommended puree diet  - Encourage PO intake  - Palliative met with family today at bedside (Son and daughter) --pt is a DNR/DNI now, no feeding tube    #Hypernatremia  - Na = 147 today  - PO hydration encouraged, monitor BMP    #Hypokalemia  -K 3.1 today, supplement KCl with IVF with K  -Follow up AM BMP    #BPH  - Chronic condition  - Continue Flomax    #Pancreatic Insufficiency  - Chronic condition  - Continue home creon, prednisone  - As per wife pt on prednisone since 2011    #DVT ppx: lovenox    GOC:  DNR/DNI    Dispo: PT recommending [facility]     88 male with PMH dementia, pancreatic insufficiency, urinary incontinence, BPH, frequent falls (left hip fracture s/p ORIF Feb 2021), upper GIB/duodenal ulcers (dx'ed Feb 2021) presents from home with poor oral intake, weakness and B/L heel ulcers; found to be Covid 19 positive.     #COVID 19  - CXR negative, currently does not have any respiratory symptoms  - Tolerating room air, O2 sat 96%  - stopped Remdesivir and was not on decadron as pt not hypoxic  - Noted D-Dimer elevated - continue lovenox once daily due to hx GI bleed      #Bilateral Heel wounds  #Right heel, stage 3 pressure ulcer  #Left heel, DTI  #Right hip DTI  - s/p cefepime and vanco in ED, cont on zosyn for now; limited course  - bilateral foot x-ray to eval with no osteomyelitis, +osteoarthritis  - Wound care consult noted; cont local wound care  - Podiatry consult noted; no debridement recommended- cont local wound care and antibx  - nutritional support, cradle booties    #Failure to Thrive/weakness  #Dementia; severe   - Pt with poor po intake and profound weakness  - Continue home donepezil, sertraline   - SLP eval - recommended puree diet  - Encourage PO intake  - Palliative met with family today at bedside (Son and daughter) --pt is a DNR/DNI now, no feeding tube    #Hypernatremia  - Na = 147 today  - PO hydration encouraged, monitor BMP    #Hypokalemia  -K 3.1 today, supplement KCl with IVF with K  -Follow up AM BMP    #BPH  - Chronic condition  - Continue Flomax    #Pancreatic Insufficiency  - Chronic condition  - Continue home creon, prednisone  - As per wife pt on prednisone since 2011    #DVT ppx: lovenox    GOC:  DNR/DNI    Dispo: PT recommending [facility]     88 male with PMH dementia, pancreatic insufficiency, urinary incontinence, BPH, frequent falls (left hip fracture s/p ORIF Feb 2021), upper GIB/duodenal ulcers (dx'ed Feb 2021) presents from home with poor oral intake, weakness and B/L heel ulcers; found to be Covid 19 positive.     #COVID 19  - CXR negative, currently does not have any respiratory symptoms  - Tolerating room air, O2 sat 96%  - stopped Remdesivir and was not on decadron as pt not hypoxic  - Noted D-Dimer elevated - continue lovenox once daily due to hx GI bleed      #Bilateral Heel wounds  #Right heel, stage 3 pressure ulcer  #Left heel, DTI  #Right hip DTI  - s/p cefepime and vanco in ED, s/p zosyn; limited course  - bilateral foot x-ray to eval with no osteomyelitis, +osteoarthritis  - Wound care consult noted; cont local wound care  - Podiatry consult noted; no debridement recommended- cont local wound care and antibx  - nutritional support, cradle booties    #Failure to Thrive/weakness  #Dementia; severe   - Pt with poor po intake and profound weakness  - Continue home donepezil, sertraline   - SLP eval - recommended puree diet  - Encourage PO intake  - Palliative met with family today at bedside (Son and daughter) --pt is a DNR/DNI now, no feeding tube    #Hypernatremia  - Na = 147 today  - PO hydration encouraged, monitor BMP    #Hypokalemia  -K 3.1 today, supplement KCl with IVF with K  -Follow up AM BMP    #BPH  - Chronic condition  - Continue Flomax    #Pancreatic Insufficiency  - Chronic condition  - Continue home creon, prednisone  - As per wife pt on prednisone since 2011    #DVT ppx: lovenox    GOC:  DNR/DNI    Dispo: PT recommending [facility]     88 male with PMH dementia, pancreatic insufficiency, urinary incontinence, BPH, frequent falls (left hip fracture s/p ORIF Feb 2021), upper GIB/duodenal ulcers (dx'ed Feb 2021) presents from home with poor oral intake, weakness and B/L heel ulcers; found to be Covid 19 positive.     #COVID 19  - CXR negative, currently does not have any respiratory symptoms  - Tolerating room air, O2 sat 96%  - stopped Remdesivir and was not on decadron as pt not hypoxic  - Noted D-Dimer elevated - continue lovenox once daily due to hx GI bleed      #Bilateral Heel wounds  #Right heel, stage 3 pressure ulcer  #Left heel, DTI  #Right hip DTI  - s/p cefepime and vanco in ED, s/p zosyn; limited course  - bilateral foot x-ray to eval with no osteomyelitis, +osteoarthritis  - Wound care consult noted; cont local wound care  - Podiatry consult noted; no debridement recommended- cont local wound care and antibx  - nutritional support, cradle booties    #Failure to Thrive/weakness  #Dementia; severe   - Pt with poor po intake and profound weakness  - Continue home donepezil, sertraline   - SLP eval - recommended puree diet  - Encourage PO intake  - Palliative met with family today at bedside (Son and daughter) --pt is a DNR/DNI now, no feeding tube    #Hypernatremia  - Na = 147 today  - PO hydration encouraged, monitor BMP    #Hypokalemia  -K 3.1 today, supplement KCl with IVF with K  -Follow up AM BMP    #BPH  - Chronic condition  - Continue Flomax    #Pancreatic Insufficiency  - Chronic condition  - Continue home creon, prednisone  - As per wife pt on prednisone since 2011    #DVT ppx: lovenox    GOC:  DNR/DNI    Dispo: PT recommending [facility].  Updated daughter-in-law at bedside on plan of care.  She states she fed the patient lunch.  She and her  (pts Son) do no want a feeding tube.

## 2022-07-22 NOTE — PROGRESS NOTE ADULT - SUBJECTIVE AND OBJECTIVE BOX
Patient is a 88y old  Male who presents with a chief complaint of heel ulcers, covid (2022 13:09)      Patient seen and examined at bedside. No overnight events reported.   Pt. does not offer complaints.    ALLERGIES:  No Known Allergies    MEDICATIONS  (STANDING):  ascorbic acid 500 milliGRAM(s) Oral daily  collagenase Ointment 1 Application(s) Topical daily  collagenase Ointment 1 Application(s) Topical daily  donepezil 5 milliGRAM(s) Oral at bedtime  enoxaparin Injectable 40 milliGRAM(s) SubCutaneous every 24 hours  pancrelipase  (CREON 12,000 Lipase Units) 6 Capsule(s) Oral three times a day with meals  pantoprazole    Tablet 40 milliGRAM(s) Oral before breakfast  piperacillin/tazobactam IVPB.. 3.375 Gram(s) IV Intermittent every 8 hours  potassium chloride   Powder 40 milliEquivalent(s) Oral once  predniSONE   Tablet 2.5 milliGRAM(s) Oral daily  senna 2 Tablet(s) Oral at bedtime  sertraline 12.5 milliGRAM(s) Oral daily  tamsulosin 0.4 milliGRAM(s) Oral at bedtime    MEDICATIONS  (PRN):  acetaminophen     Tablet .. 650 milliGRAM(s) Oral every 6 hours PRN Temp greater or equal to 38C (100.4F), Mild Pain (1 - 3)  aluminum hydroxide/magnesium hydroxide/simethicone Suspension 30 milliLiter(s) Oral every 4 hours PRN Dyspepsia  melatonin 3 milliGRAM(s) Oral at bedtime PRN Insomnia  ondansetron Injectable 4 milliGRAM(s) IV Push every 8 hours PRN Nausea and/or Vomiting    Vital Signs Last 24 Hrs  T(F): 98.6 (2022 02:38), Max: 99.5 (2022 16:34)  HR: 68 (2022 02:38) (68 - 87)  BP: 149/57 (2022 02:38) (149/57 - 171/86)  RR: 18 (2022 02:38) (16 - 18)  SpO2: 96% (2022 02:38) (95% - 96%)  I&O's Summary    2022 07:01  -  2022 07:00  --------------------------------------------------------  IN: 225 mL / OUT: 200 mL / NET: 25 mL      PHYSICAL EXAM:  General: NAD, A/O x 1, confused, minimally verbal.  ENT: mouth/lips crusty, no thrush  Neck: Supple, No JVD  Lungs: Clear to auscultation bilaterally, good air entry, non-labored breathing  Cardio: RRR, S1/S2, No murmur  Abdomen: Soft, Nontender, Nondistended; Bowel sounds present  Extremities: No calf tenderness, No cyanosis, No pitting edema  Skin:  b/l heel ulcers, wounds dressed  Neuro:  confused, unable to follow commands    LABS:                        11.7   5.55  )-----------( 284      ( 2022 07:10 )             36.8         147  |  111  |  24  ----------------------------<  88  3.1   |  29  |  1.00    Ca    7.5      2022 07:10    TPro  6.0  /  Alb  1.7  /  TBili  0.6  /  DBili  0.2  /  AST  31  /  ALT  25  /  AlkPhos  76  07-22          PT/INR - ( 2022 07:10 )   PT: 16.4 sec;   INR: 1.41 ratio         PTT - ( 2022 13:15 )  PTT:31.3 sec  Lactate, Blood: 1.1 mmol/L ( @ 13:15)    Procalcitonin, Serum: 0.22 ng/mL (22 @ 05:56)                          Urinalysis Basic - ( 2022 16:10 )    Color: Yellow / Appearance: Clear / S.020 / pH: x  Gluc: x / Ketone: Small  / Bili: Negative / Urobili: Negative   Blood: x / Protein: 30 mg/dL / Nitrite: Negative   Leuk Esterase: Negative / RBC: >50 /HPF / WBC Negative /HPF   Sq Epi: x / Non Sq Epi: Neg.-Few / Bacteria: Few /HPF        Culture - Urine (collected 2022 16:10)  Source: Clean Catch Clean Catch (Midstream)  Final Report (2022 20:59):    No growth    Culture - Blood (collected 2022 13:15)  Source: .Blood Blood-Peripheral  Preliminary Report (2022 19:02):    No growth to date.    Culture - Blood (collected 2022 13:15)  Source: .Blood Blood-Peripheral  Preliminary Report (2022 19:02):    No growth to date.        RADIOLOGY & ADDITIONAL TESTS:    Care Discussed with Consultants/Other Providers:

## 2022-07-22 NOTE — DIETITIAN NUTRITION RISK NOTIFICATION - TREATMENT: THE FOLLOWING DIET HAS BEEN RECOMMENDED
Diet, Pureed:   Mildly Thick Liquids (MILDTHICKLIQS) No Straws  Single Sips Only (07-20-22 @ 10:29) [Active]

## 2022-07-22 NOTE — CHART NOTE - NSCHARTNOTEFT_GEN_A_CORE
Nutrition Follow Up Note  Hospital Course   (Per Electronic Medical Record)    Source:  Patient [X]  Medical Record [X]      Diet: Diet, Pureed:   Mildly Thick Liquids (MILDTHICKLIQS) No Straws  Single Sips Only (07-20-22 @ 10:29) [Active]      Nutrition Consult unintended weight loss PTA: Patient currently COVID+. Patient is non-verable but NFPE was performed and indicated moderate malnutrition. Patient noted to have poor PO intake. Altered nutrition labs most likely due to poor PO intake. Hypokalemia noted: K+ 3.1 (7/22). Pt remains on Pureed-Mildly Thick Liquid diet. Recommend enteral nutrition regimen as medically feasible. Recommend initiating EN feeds of Jevity 1.5 @ 40 mL/hr continuous (960 total volume) via NGT (provides 1440 kcal, 61g pro, and 730 mL of H2O) + Prosource QD (Provides additional 60 kcal and 15g of pro). Provide 180 mL of water flushes 4x/day (provides 720 mL of additional H2O).        START [id number]    147<H>  |  111<H>  |  24<H>  ----------------------------<  88  3.1<L>   |  29  |  1.00    Ca    7.5<L>      22 Jul 2022 07:10    TPro  6.0  /  Alb  1.7<L>  /  TBili  0.6  /  DBili  0.2  /  AST  31  /  ALT  25  /  AlkPhos  76  07-22  END CHEMFISH  START MEDSACTIVEMEDICATIONS  (STANDING):  ascorbic acid 500 milliGRAM(s) Oral daily  collagenase Ointment 1 Application(s) Topical daily  collagenase Ointment 1 Application(s) Topical daily  donepezil 5 milliGRAM(s) Oral at bedtime  enoxaparin Injectable 40 milliGRAM(s) SubCutaneous every 24 hours  pancrelipase  (CREON 12,000 Lipase Units) 6 Capsule(s) Oral three times a day with meals  pantoprazole    Tablet 40 milliGRAM(s) Oral before breakfast  piperacillin/tazobactam IVPB.. 3.375 Gram(s) IV Intermittent every 8 hours  potassium chloride   Powder 40 milliEquivalent(s) Oral once  predniSONE   Tablet 2.5 milliGRAM(s) Oral daily  senna 2 Tablet(s) Oral at bedtime  sertraline 12.5 milliGRAM(s) Oral daily  sodium chloride 0.9% with potassium chloride 20 mEq/L 1000 milliLiter(s) (75 mL/Hr) IV Continuous <Continuous>  tamsulosin 0.4 milliGRAM(s) Oral at bedtime    MEDICATIONS  (PRN):  acetaminophen     Tablet .. 650 milliGRAM(s) Oral every 6 hours PRN Temp greater or equal to 38C (100.4F), Mild Pain (1 - 3)  aluminum hydroxide/magnesium hydroxide/simethicone Suspension 30 milliLiter(s) Oral every 4 hours PRN Dyspepsia  melatonin 3 milliGRAM(s) Oral at bedtime PRN Insomnia  ondansetron Injectable 4 milliGRAM(s) IV Push every 8 hours PRN Nausea and/or Vomiting  END MEDSACTIVE  START DIETORDEREND DIETORDER  START ADMITDXWeakness    END ADMITDX  START IOFS  END IOFS  START SKINPUEND SKINPU        Pertinent Medications: MEDICATIONS  (STANDING):  ascorbic acid 500 milliGRAM(s) Oral daily  collagenase Ointment 1 Application(s) Topical daily  collagenase Ointment 1 Application(s) Topical daily  donepezil 5 milliGRAM(s) Oral at bedtime  enoxaparin Injectable 40 milliGRAM(s) SubCutaneous every 24 hours  pancrelipase  (CREON 12,000 Lipase Units) 6 Capsule(s) Oral three times a day with meals  pantoprazole    Tablet 40 milliGRAM(s) Oral before breakfast  piperacillin/tazobactam IVPB.. 3.375 Gram(s) IV Intermittent every 8 hours  potassium chloride   Powder 40 milliEquivalent(s) Oral once  predniSONE   Tablet 2.5 milliGRAM(s) Oral daily  senna 2 Tablet(s) Oral at bedtime  sertraline 12.5 milliGRAM(s) Oral daily  sodium chloride 0.9% with potassium chloride 20 mEq/L 1000 milliLiter(s) (75 mL/Hr) IV Continuous <Continuous>  tamsulosin 0.4 milliGRAM(s) Oral at bedtime    MEDICATIONS  (PRN):  acetaminophen     Tablet .. 650 milliGRAM(s) Oral every 6 hours PRN Temp greater or equal to 38C (100.4F), Mild Pain (1 - 3)  aluminum hydroxide/magnesium hydroxide/simethicone Suspension 30 milliLiter(s) Oral every 4 hours PRN Dyspepsia  melatonin 3 milliGRAM(s) Oral at bedtime PRN Insomnia  ondansetron Injectable 4 milliGRAM(s) IV Push every 8 hours PRN Nausea and/or Vomiting      Pertinent Labs:  07-22 Na147 mmol/L<H> Glu 88 mg/dL K+ 3.1 mmol/L<L> Cr  1.00 mg/dL BUN 24 mg/dL<H> 07-22 Alb 1.7 g/dL<L>            Enteral/Parenteral Nutrition: Recommend initiating EN feeds of Jevity 1.5 @ 40 mL/hr continuous (960 total volume) via NGT (provides 1440 kcal, 61g pro, and 730 mL of H2O) + Prosource QD (Provides additional 60 kcal and 15g of pro). Provide 180 mL of water flushes 4x/day (provides 720 mL of additional H2O)    Current Weight (lbs):  164 lb on 7/19  Weight Trends (lbs):    No weight trends - recommend obtain daily weights for weight trends      Skin: Right heel stage 3, left heel DTI, Right hip DTI, Left heel lateral noted    Edema: NN    Last Bowel Movement: none noted        Estimated Needs:   [X] No Change Since Previous Assessment    Previous Nutrition Diagnosis:   Moderate Malnutrition    Nutrition Diagnosis is [X] Ongoing - EN regimen recommendation        Interventions:   1. Recommend initiating EN feeds of Jevity 1.5 @ 40 mL/hr continuous (960 total volume) via NGT (provides 1440 kcal, 61g pro, and 730 mL of H2O) + Prosource QD (Provides additional 60 kcal and 15g of pro). Provide 180 mL of water flushes 4x/day (provides 720 mL of additional H2O)  2. Monitor daily PO intakes and weights  3. Monitor labs    Monitoring & Evaluation:   [X] Weights   [X] PO Intake   [X] Skin Integrity   [X] Follow Up (Per Protocol)  [X] Tolerance to Diet Prescription   [X] Other: Labs & POCT    Registered Dietitian/Nutritionist Remains Available.  Jonathan Ramirez RD, CDN    Phone# [phone] Nutrition Follow Up Note  Hospital Course   (Per Electronic Medical Record)    Source:  Patient [X]  Medical Record [X]      Diet: Diet, Pureed:   Mildly Thick Liquids (MILDTHICKLIQS) No Straws  Single Sips Only (07-20-22 @ 10:29) [Active]      Nutrition Consult unintended weight loss PTA: Patient currently COVID+. Patient is non-verable but NFPE was performed and indicated moderate malnutrition. Patient noted to have poor PO intake. Altered nutrition labs most likely due to poor PO intake. Hypokalemia noted: K+ 3.1 (7/22). Pt remains on Pureed-Mildly Thick Liquid diet. Recommend enteral nutrition regimen as medically feasible. Recommend initiating EN feeds of Jevity 1.5 @ 40 mL/hr continuous (960 total volume) via NGT (provides 1440 kcal, 61g pro, and 730 mL of H2O) + Prosource QD (Provides additional 60 kcal and 15g of pro). Provide 180 mL of water flushes 4x/day (provides 720 mL of additional H2O).         START [id number]    147<H>  |  111<H>  |  24<H>  ----------------------------<  88  3.1<L>   |  29  |  1.00    Ca    7.5<L>      22 Jul 2022 07:10    TPro  6.0  /  Alb  1.7<L>  /  TBili  0.6  /  DBili  0.2  /  AST  31  /  ALT  25  /  AlkPhos  76  07-22  END CHEMFISH  START MEDSACTIVEMEDICATIONS  (STANDING):  ascorbic acid 500 milliGRAM(s) Oral daily  collagenase Ointment 1 Application(s) Topical daily  collagenase Ointment 1 Application(s) Topical daily  donepezil 5 milliGRAM(s) Oral at bedtime  enoxaparin Injectable 40 milliGRAM(s) SubCutaneous every 24 hours  pancrelipase  (CREON 12,000 Lipase Units) 6 Capsule(s) Oral three times a day with meals  pantoprazole    Tablet 40 milliGRAM(s) Oral before breakfast  piperacillin/tazobactam IVPB.. 3.375 Gram(s) IV Intermittent every 8 hours  potassium chloride   Powder 40 milliEquivalent(s) Oral once  predniSONE   Tablet 2.5 milliGRAM(s) Oral daily  senna 2 Tablet(s) Oral at bedtime  sertraline 12.5 milliGRAM(s) Oral daily  sodium chloride 0.9% with potassium chloride 20 mEq/L 1000 milliLiter(s) (75 mL/Hr) IV Continuous <Continuous>  tamsulosin 0.4 milliGRAM(s) Oral at bedtime    MEDICATIONS  (PRN):  acetaminophen     Tablet .. 650 milliGRAM(s) Oral every 6 hours PRN Temp greater or equal to 38C (100.4F), Mild Pain (1 - 3)  aluminum hydroxide/magnesium hydroxide/simethicone Suspension 30 milliLiter(s) Oral every 4 hours PRN Dyspepsia  melatonin 3 milliGRAM(s) Oral at bedtime PRN Insomnia  ondansetron Injectable 4 milliGRAM(s) IV Push every 8 hours PRN Nausea and/or Vomiting  END MEDSACTIVE  START DIETORDEREND DIETORDER  START ADMITDXWeakness    END ADMITDX  START IOFS  END IOFS  START SKINPUEND SKINPU        Pertinent Medications: MEDICATIONS  (STANDING):  ascorbic acid 500 milliGRAM(s) Oral daily  collagenase Ointment 1 Application(s) Topical daily  collagenase Ointment 1 Application(s) Topical daily  donepezil 5 milliGRAM(s) Oral at bedtime  enoxaparin Injectable 40 milliGRAM(s) SubCutaneous every 24 hours  pancrelipase  (CREON 12,000 Lipase Units) 6 Capsule(s) Oral three times a day with meals  pantoprazole    Tablet 40 milliGRAM(s) Oral before breakfast  piperacillin/tazobactam IVPB.. 3.375 Gram(s) IV Intermittent every 8 hours  potassium chloride   Powder 40 milliEquivalent(s) Oral once  predniSONE   Tablet 2.5 milliGRAM(s) Oral daily  senna 2 Tablet(s) Oral at bedtime  sertraline 12.5 milliGRAM(s) Oral daily  sodium chloride 0.9% with potassium chloride 20 mEq/L 1000 milliLiter(s) (75 mL/Hr) IV Continuous <Continuous>  tamsulosin 0.4 milliGRAM(s) Oral at bedtime    MEDICATIONS  (PRN):  acetaminophen     Tablet .. 650 milliGRAM(s) Oral every 6 hours PRN Temp greater or equal to 38C (100.4F), Mild Pain (1 - 3)  aluminum hydroxide/magnesium hydroxide/simethicone Suspension 30 milliLiter(s) Oral every 4 hours PRN Dyspepsia  melatonin 3 milliGRAM(s) Oral at bedtime PRN Insomnia  ondansetron Injectable 4 milliGRAM(s) IV Push every 8 hours PRN Nausea and/or Vomiting      Pertinent Labs:  07-22 Na147 mmol/L<H> Glu 88 mg/dL K+ 3.1 mmol/L<L> Cr  1.00 mg/dL BUN 24 mg/dL<H> 07-22 Alb 1.7 g/dL<L>            Enteral/Parenteral Nutrition: Recommend initiating EN feeds of Jevity 1.5 @ 40 mL/hr continuous (960 total volume) via NGT (provides 1440 kcal, 61g pro, and 730 mL of H2O) + Prosource QD (Provides additional 60 kcal and 15g of pro). Provide 180 mL of water flushes 4x/day (provides 720 mL of additional H2O)    Current Weight (lbs):  164 lb on 7/19  Weight Trends (lbs):    No weight trends - recommend obtain daily weights for weight trends      Skin: Right heel stage 3, left heel DTI, Right hip DTI, Left heel lateral noted    Edema: NN    Last Bowel Movement: none noted        Estimated Needs:   Based on IBW of 118 lb:   Kcal: 5645-8573 kcal (25-30 kcal/kg)  Pro: 54-80 (1.2-1.4 g/kg)  Fluid: 9766-7091 mL    Previous Nutrition Diagnosis:   Moderate Malnutrition    Nutrition Diagnosis is [X] Ongoing - EN regimen recommendation        Interventions:   1. Recommend initiating EN feeds of Jevity 1.5 @ 40 mL/hr continuous (960 total volume) via NGT (provides 1440 kcal, 61g pro, and 730 mL of H2O) + Prosource QD (Provides additional 60 kcal and 15g of pro). Provide 180 mL of water flushes 4x/day (provides 720 mL of additional H2O)  2. Monitor daily PO intakes and weights  3. Monitor labs    Monitoring & Evaluation:   [X] Weights   [X] PO Intake   [X] Skin Integrity   [X] Follow Up (Per Protocol)  [X] Tolerance to Diet Prescription   [X] Other: Labs & POCT    Registered Dietitian/Nutritionist Remains Available.  Joanthan Ramirez RD, CDN    Phone# [phone]

## 2022-07-23 LAB
ALBUMIN SERPL ELPH-MCNC: 1.4 G/DL — LOW (ref 3.3–5)
ALP SERPL-CCNC: 73 U/L — SIGNIFICANT CHANGE UP (ref 40–120)
ALT FLD-CCNC: 17 U/L — SIGNIFICANT CHANGE UP (ref 10–45)
ANION GAP SERPL CALC-SCNC: 5 MMOL/L — SIGNIFICANT CHANGE UP (ref 5–17)
AST SERPL-CCNC: 26 U/L — SIGNIFICANT CHANGE UP (ref 10–40)
BILIRUB DIRECT SERPL-MCNC: 0.1 MG/DL — SIGNIFICANT CHANGE UP (ref 0–0.3)
BILIRUB INDIRECT FLD-MCNC: 0.2 MG/DL — SIGNIFICANT CHANGE UP (ref 0.2–1)
BILIRUB SERPL-MCNC: 0.3 MG/DL — SIGNIFICANT CHANGE UP (ref 0.2–1.2)
BUN SERPL-MCNC: 19 MG/DL — SIGNIFICANT CHANGE UP (ref 7–23)
CALCIUM SERPL-MCNC: 7.3 MG/DL — LOW (ref 8.4–10.5)
CHLORIDE SERPL-SCNC: 116 MMOL/L — HIGH (ref 96–108)
CO2 SERPL-SCNC: 28 MMOL/L — SIGNIFICANT CHANGE UP (ref 22–31)
CREAT SERPL-MCNC: 0.74 MG/DL — SIGNIFICANT CHANGE UP (ref 0.5–1.3)
CREAT SERPL-MCNC: 0.76 MG/DL — SIGNIFICANT CHANGE UP (ref 0.5–1.3)
EGFR: 87 ML/MIN/1.73M2 — SIGNIFICANT CHANGE UP
EGFR: 87 ML/MIN/1.73M2 — SIGNIFICANT CHANGE UP
GLUCOSE SERPL-MCNC: 99 MG/DL — SIGNIFICANT CHANGE UP (ref 70–99)
INR BLD: 1.44 RATIO — HIGH (ref 0.88–1.16)
POTASSIUM SERPL-MCNC: 3.8 MMOL/L — SIGNIFICANT CHANGE UP (ref 3.5–5.3)
POTASSIUM SERPL-SCNC: 3.8 MMOL/L — SIGNIFICANT CHANGE UP (ref 3.5–5.3)
PROT SERPL-MCNC: 5.4 G/DL — LOW (ref 6–8.3)
PROTHROM AB SERPL-ACNC: 16.8 SEC — HIGH (ref 10.5–13.4)
SODIUM SERPL-SCNC: 149 MMOL/L — HIGH (ref 135–145)

## 2022-07-23 PROCEDURE — 99232 SBSQ HOSP IP/OBS MODERATE 35: CPT

## 2022-07-23 RX ORDER — SODIUM CHLORIDE 9 MG/ML
1000 INJECTION, SOLUTION INTRAVENOUS
Refills: 0 | Status: DISCONTINUED | OUTPATIENT
Start: 2022-07-23 | End: 2022-08-02

## 2022-07-23 RX ADMIN — SERTRALINE 12.5 MILLIGRAM(S): 25 TABLET, FILM COATED ORAL at 13:26

## 2022-07-23 RX ADMIN — Medication 1 APPLICATION(S): at 13:25

## 2022-07-23 RX ADMIN — Medication 6 CAPSULE(S): at 17:03

## 2022-07-23 RX ADMIN — SODIUM CHLORIDE 75 MILLILITER(S): 9 INJECTION, SOLUTION INTRAVENOUS at 13:46

## 2022-07-23 RX ADMIN — TAMSULOSIN HYDROCHLORIDE 0.4 MILLIGRAM(S): 0.4 CAPSULE ORAL at 21:51

## 2022-07-23 RX ADMIN — DONEPEZIL HYDROCHLORIDE 5 MILLIGRAM(S): 10 TABLET, FILM COATED ORAL at 21:51

## 2022-07-23 RX ADMIN — SENNA PLUS 2 TABLET(S): 8.6 TABLET ORAL at 21:51

## 2022-07-23 RX ADMIN — DEXTROSE MONOHYDRATE, SODIUM CHLORIDE, AND POTASSIUM CHLORIDE 75 MILLILITER(S): 50; .745; 4.5 INJECTION, SOLUTION INTRAVENOUS at 02:50

## 2022-07-23 RX ADMIN — Medication 6 CAPSULE(S): at 13:24

## 2022-07-23 RX ADMIN — Medication 6 CAPSULE(S): at 07:47

## 2022-07-23 RX ADMIN — Medication 500 MILLIGRAM(S): at 13:26

## 2022-07-23 RX ADMIN — ENOXAPARIN SODIUM 40 MILLIGRAM(S): 100 INJECTION SUBCUTANEOUS at 22:19

## 2022-07-23 NOTE — PROGRESS NOTE ADULT - NS ATTEND AMEND GEN_ALL_CORE FT
88 non verbal male with PMH advanced dementia, pancreatic insufficiency, urinary incontinence, BPH, frequent falls (left hip fracture s/p ORIF Feb 2021), upper GIB secondary to duodenal ulcers (Feb 2021) presents from home with decreased oral intake and B/L heel wounds; found to be Covid 19 positive.   Heel deep tissue injuries: There is no evidence of OM on XR. cont wound care, cont  santyl.   Poor PO intake/FTT: no feeding tube, no heroic measures. Palliative approach. DNR.

## 2022-07-23 NOTE — PROGRESS NOTE ADULT - ASSESSMENT
88 male with PMH dementia, pancreatic insufficiency, urinary incontinence, BPH, frequent falls (left hip fracture s/p ORIF Feb 2021), upper GIB/duodenal ulcers (dx'ed Feb 2021) presents from home with poor oral intake, weakness and B/L heel ulcers; found to be Covid 19 positive.     #COVID 19  - CXR negative, currently does not have any respiratory symptoms  - Tolerating room air, O2 sat 96%  - stopped Remdesivir and was not on decadron as pt not hypoxic  - Noted D-Dimer elevated - continue lovenox once daily due to hx GI bleed      #Bilateral Heel wounds  #Right heel, stage 3 pressure ulcer  #Left heel, DTI  #Right hip DTI  - s/p cefepime and vanco in ED, s/p zosyn; limited course  - bilateral foot x-ray to eval with no osteomyelitis, +osteoarthritis  - Wound care consult noted; cont local wound care  - Podiatry consult noted; no debridement recommended- cont local wound care and antibx  - nutritional support, cradle booties    #Failure to Thrive/weakness  #Dementia; severe   - Pt with poor po intake and profound weakness  - Continue home donepezil, sertraline   - SLP eval - recommended puree diet  - Encourage PO intake  - Palliative met with family at bedside (Son and daughter) --pt is a DNR/DNI now, no feeding tube    #Hypernatremia  - Na = 149 today  - PO hydration encouraged, monitor BMP  - Stop NS and Start D5W @ 75 cc hr for 12 hrs    #Hypokalemia  -K 3.8 today,       #BPH  - Chronic condition  - Continue Flomax    #Pancreatic Insufficiency  - Chronic condition  - Continue home creon, prednisone  - As per wife pt on prednisone since 2011    #DVT ppx: lovenox    GOC:  DNR/DNI/ no feeding time     Dispo: PT recommending [facility].  Updated daughter-in-law at bedside on plan of care.       88 male with PMH dementia, pancreatic insufficiency, urinary incontinence, BPH, frequent falls (left hip fracture s/p ORIF Feb 2021), upper GIB/duodenal ulcers (dx'ed Feb 2021) presents from home with poor oral intake, weakness and B/L heel ulcers; found to be Covid 19 positive.     #COVID 19  - CXR negative, currently does not have any respiratory symptoms  - Tolerating room air, O2 sat 96%  - stopped Remdesivir and was not on decadron as pt not hypoxic  - Noted D-Dimer elevated - continue lovenox once daily due to hx GI bleed      #Bilateral Heel wounds  #Right heel, stage 3 pressure ulcer  #Left heel, DTI  #Right hip DTI  - s/p cefepime and vanco in ED, s/p zosyn; limited course  - bilateral foot x-ray to eval with no osteomyelitis, +osteoarthritis  - Wound care consult noted; cont local wound care  - Podiatry consult noted; no debridement recommended- cont local wound care and antibx  - nutritional support, cradle booties    #Failure to Thrive/weakness  #Dementia; severe   - Pt with poor po intake and profound weakness  - Continue home donepezil, sertraline   - SLP eval - recommended puree diet  - Encourage PO intake  - Palliative met with family at bedside (Son and daughter) --pt is a DNR/DNI now, no feeding tube    #Hypernatremia  - Na = 149 today  - PO hydration encouraged, monitor BMP  - Stop NS and Start D5W @ 75 cc hr for 12 hrs    #Hypokalemia  -K 3.8 today,       #BPH  - Chronic condition  - Continue Flomax    #Pancreatic Insufficiency  - Chronic condition  - Continue home creon, prednisone  - As per wife pt on prednisone since 2011    #DVT ppx: lovenox    GOC:  DNR/DNI/ no feeding time . Palliative following    Dispo: PT recommending [facility].  Updated daughter-in-law at bedside on plan of care.

## 2022-07-23 NOTE — PROGRESS NOTE ADULT - SUBJECTIVE AND OBJECTIVE BOX
Patient is a 88y old  Male who presents with a chief complaint of weakness/FTT (22 Jul 2022 10:40)    No reported events overnight   Patient seen and examined at bedside.    ALLERGIES:  No Known Allergies    MEDICATIONS  (STANDING):  ascorbic acid 500 milliGRAM(s) Oral daily  collagenase Ointment 1 Application(s) Topical daily  collagenase Ointment 1 Application(s) Topical daily  dextrose 5%. 1000 milliLiter(s) (75 mL/Hr) IV Continuous <Continuous>  donepezil 5 milliGRAM(s) Oral at bedtime  enoxaparin Injectable 40 milliGRAM(s) SubCutaneous every 24 hours  pancrelipase  (CREON 12,000 Lipase Units) 6 Capsule(s) Oral three times a day with meals  pantoprazole    Tablet 40 milliGRAM(s) Oral before breakfast  predniSONE   Tablet 2.5 milliGRAM(s) Oral daily  senna 2 Tablet(s) Oral at bedtime  sertraline 12.5 milliGRAM(s) Oral daily  tamsulosin 0.4 milliGRAM(s) Oral at bedtime    MEDICATIONS  (PRN):  acetaminophen     Tablet .. 650 milliGRAM(s) Oral every 6 hours PRN Temp greater or equal to 38C (100.4F), Mild Pain (1 - 3)  aluminum hydroxide/magnesium hydroxide/simethicone Suspension 30 milliLiter(s) Oral every 4 hours PRN Dyspepsia  melatonin 3 milliGRAM(s) Oral at bedtime PRN Insomnia  ondansetron Injectable 4 milliGRAM(s) IV Push every 8 hours PRN Nausea and/or Vomiting    Vital Signs Last 24 Hrs  T(F): 97.8 (23 Jul 2022 05:34), Max: 97.8 (22 Jul 2022 20:13)  HR: 84 (23 Jul 2022 05:34) (68 - 84)  BP: 139/63 (23 Jul 2022 05:34) (139/63 - 151/76)  RR: 15 (23 Jul 2022 05:34) (13 - 16)  SpO2: 95% (23 Jul 2022 05:34) (95% - 96%)  I&O's Summary    22 Jul 2022 07:01  -  23 Jul 2022 07:00  --------------------------------------------------------  IN: 850 mL / OUT: 1 mL / NET: 849 mL      BMI (kg/m2): 30.2 (07-19-22 @ 12:16)  PHYSICAL EXAM:  General: elderly, alert, confused  ENT: MM dry, no thrush  Neck: Supple, No JVD  Lungs: Non labored breathing,  poor inspiratory effort   Cardio: RRR, S1/S2, no pitting edema bilaterally  Abdomen: Soft, Nontender, Nondistended; Bowel sounds present  Extremities: No calf tenderness, moves all extremities, bilateral heel ulcers- wounds wrapped    LABS:                        11.7   5.55  )-----------( 284      ( 22 Jul 2022 07:10 )             36.8       07-23    149  |  116  |  19  ----------------------------<  99  3.8   |  28  |  0.76    Ca    7.3      23 Jul 2022 07:40    TPro  5.4  /  Alb  1.4  /  TBili  0.3  /  DBili  0.1  /  AST  26  /  ALT  17  /  AlkPhos  73  07-23       PT/INR - ( 23 Jul 2022 07:00 )   PT: 16.8 sec;   INR: 1.44 ratio                                        Culture - Urine (collected 19 Jul 2022 16:10)  Source: Clean Catch Clean Catch (Midstream)  Final Report (20 Jul 2022 20:59):    No growth    Culture - Blood (collected 19 Jul 2022 13:15)  Source: .Blood Blood-Peripheral  Preliminary Report (20 Jul 2022 19:02):    No growth to date.    Culture - Blood (collected 19 Jul 2022 13:15)  Source: .Blood Blood-Peripheral  Preliminary Report (20 Jul 2022 19:02):    No growth to date.          RADIOLOGY & ADDITIONAL TESTS:    Care Discussed with Consultants/Other Providers:    Patient is a 88y old  Male who presents with a chief complaint of weakness/FTT (22 Jul 2022 10:40)  Pt taking minimal PO   No reported events overnight   Patient seen and examined at bedside.    ALLERGIES:  No Known Allergies    MEDICATIONS  (STANDING):  ascorbic acid 500 milliGRAM(s) Oral daily  collagenase Ointment 1 Application(s) Topical daily  collagenase Ointment 1 Application(s) Topical daily  dextrose 5%. 1000 milliLiter(s) (75 mL/Hr) IV Continuous <Continuous>  donepezil 5 milliGRAM(s) Oral at bedtime  enoxaparin Injectable 40 milliGRAM(s) SubCutaneous every 24 hours  pancrelipase  (CREON 12,000 Lipase Units) 6 Capsule(s) Oral three times a day with meals  pantoprazole    Tablet 40 milliGRAM(s) Oral before breakfast  predniSONE   Tablet 2.5 milliGRAM(s) Oral daily  senna 2 Tablet(s) Oral at bedtime  sertraline 12.5 milliGRAM(s) Oral daily  tamsulosin 0.4 milliGRAM(s) Oral at bedtime    MEDICATIONS  (PRN):  acetaminophen     Tablet .. 650 milliGRAM(s) Oral every 6 hours PRN Temp greater or equal to 38C (100.4F), Mild Pain (1 - 3)  aluminum hydroxide/magnesium hydroxide/simethicone Suspension 30 milliLiter(s) Oral every 4 hours PRN Dyspepsia  melatonin 3 milliGRAM(s) Oral at bedtime PRN Insomnia  ondansetron Injectable 4 milliGRAM(s) IV Push every 8 hours PRN Nausea and/or Vomiting    Vital Signs Last 24 Hrs  T(F): 97.8 (23 Jul 2022 05:34), Max: 97.8 (22 Jul 2022 20:13)  HR: 84 (23 Jul 2022 05:34) (68 - 84)  BP: 139/63 (23 Jul 2022 05:34) (139/63 - 151/76)  RR: 15 (23 Jul 2022 05:34) (13 - 16)  SpO2: 95% (23 Jul 2022 05:34) (95% - 96%)  I&O's Summary    22 Jul 2022 07:01  -  23 Jul 2022 07:00  --------------------------------------------------------  IN: 850 mL / OUT: 1 mL / NET: 849 mL      BMI (kg/m2): 30.2 (07-19-22 @ 12:16)  PHYSICAL EXAM:  General: elderly, alert, confused  ENT: MM dry, no thrush  Neck: Supple, No JVD  Lungs: Non labored breathing,  poor inspiratory effort   Cardio: RRR, S1/S2, no pitting edema bilaterally  Abdomen: Soft, Nontender, Nondistended; Bowel sounds present  Extremities: No calf tenderness, moves all extremities, bilateral heel ulcers- wounds wrapped    LABS:                        11.7   5.55  )-----------( 284      ( 22 Jul 2022 07:10 )             36.8       07-23    149  |  116  |  19  ----------------------------<  99  3.8   |  28  |  0.76    Ca    7.3      23 Jul 2022 07:40    TPro  5.4  /  Alb  1.4  /  TBili  0.3  /  DBili  0.1  /  AST  26  /  ALT  17  /  AlkPhos  73  07-23       PT/INR - ( 23 Jul 2022 07:00 )   PT: 16.8 sec;   INR: 1.44 ratio                                        Culture - Urine (collected 19 Jul 2022 16:10)  Source: Clean Catch Clean Catch (Midstream)  Final Report (20 Jul 2022 20:59):    No growth    Culture - Blood (collected 19 Jul 2022 13:15)  Source: .Blood Blood-Peripheral  Preliminary Report (20 Jul 2022 19:02):    No growth to date.    Culture - Blood (collected 19 Jul 2022 13:15)  Source: .Blood Blood-Peripheral  Preliminary Report (20 Jul 2022 19:02):    No growth to date.          RADIOLOGY & ADDITIONAL TESTS:    Care Discussed with Consultants/Other Providers:

## 2022-07-24 LAB
ALBUMIN SERPL ELPH-MCNC: 1.4 G/DL — LOW (ref 3.3–5)
ALP SERPL-CCNC: 75 U/L — SIGNIFICANT CHANGE UP (ref 40–120)
ALT FLD-CCNC: 21 U/L — SIGNIFICANT CHANGE UP (ref 10–45)
ANION GAP SERPL CALC-SCNC: 5 MMOL/L — SIGNIFICANT CHANGE UP (ref 5–17)
AST SERPL-CCNC: 36 U/L — SIGNIFICANT CHANGE UP (ref 10–40)
BILIRUB DIRECT SERPL-MCNC: 0.1 MG/DL — SIGNIFICANT CHANGE UP (ref 0–0.3)
BILIRUB INDIRECT FLD-MCNC: 0.3 MG/DL — SIGNIFICANT CHANGE UP (ref 0.2–1)
BILIRUB SERPL-MCNC: 0.4 MG/DL — SIGNIFICANT CHANGE UP (ref 0.2–1.2)
BUN SERPL-MCNC: 12 MG/DL — SIGNIFICANT CHANGE UP (ref 7–23)
CALCIUM SERPL-MCNC: 6.9 MG/DL — LOW (ref 8.4–10.5)
CHLORIDE SERPL-SCNC: 106 MMOL/L — SIGNIFICANT CHANGE UP (ref 96–108)
CO2 SERPL-SCNC: 29 MMOL/L — SIGNIFICANT CHANGE UP (ref 22–31)
CREAT SERPL-MCNC: 0.54 MG/DL — SIGNIFICANT CHANGE UP (ref 0.5–1.3)
CREAT SERPL-MCNC: 0.58 MG/DL — SIGNIFICANT CHANGE UP (ref 0.5–1.3)
CULTURE RESULTS: SIGNIFICANT CHANGE UP
CULTURE RESULTS: SIGNIFICANT CHANGE UP
EGFR: 94 ML/MIN/1.73M2 — SIGNIFICANT CHANGE UP
EGFR: 96 ML/MIN/1.73M2 — SIGNIFICANT CHANGE UP
GLUCOSE SERPL-MCNC: 102 MG/DL — HIGH (ref 70–99)
HCT VFR BLD CALC: 31.8 % — LOW (ref 39–50)
HGB BLD-MCNC: 10.5 G/DL — LOW (ref 13–17)
INR BLD: 1.37 RATIO — HIGH (ref 0.88–1.16)
MCHC RBC-ENTMCNC: 29.8 PG — SIGNIFICANT CHANGE UP (ref 27–34)
MCHC RBC-ENTMCNC: 33 GM/DL — SIGNIFICANT CHANGE UP (ref 32–36)
MCV RBC AUTO: 90.3 FL — SIGNIFICANT CHANGE UP (ref 80–100)
NRBC # BLD: 0 /100 WBCS — SIGNIFICANT CHANGE UP (ref 0–0)
PLATELET # BLD AUTO: 293 K/UL — SIGNIFICANT CHANGE UP (ref 150–400)
POTASSIUM SERPL-MCNC: 2.9 MMOL/L — CRITICAL LOW (ref 3.5–5.3)
POTASSIUM SERPL-SCNC: 2.9 MMOL/L — CRITICAL LOW (ref 3.5–5.3)
PROT SERPL-MCNC: 5.2 G/DL — LOW (ref 6–8.3)
PROTHROM AB SERPL-ACNC: 16 SEC — HIGH (ref 10.5–13.4)
RBC # BLD: 3.52 M/UL — LOW (ref 4.2–5.8)
RBC # FLD: 13.1 % — SIGNIFICANT CHANGE UP (ref 10.3–14.5)
SODIUM SERPL-SCNC: 140 MMOL/L — SIGNIFICANT CHANGE UP (ref 135–145)
SPECIMEN SOURCE: SIGNIFICANT CHANGE UP
SPECIMEN SOURCE: SIGNIFICANT CHANGE UP
WBC # BLD: 4.93 K/UL — SIGNIFICANT CHANGE UP (ref 3.8–10.5)
WBC # FLD AUTO: 4.93 K/UL — SIGNIFICANT CHANGE UP (ref 3.8–10.5)

## 2022-07-24 PROCEDURE — 99497 ADVNCD CARE PLAN 30 MIN: CPT | Mod: 25

## 2022-07-24 PROCEDURE — 99233 SBSQ HOSP IP/OBS HIGH 50: CPT

## 2022-07-24 RX ORDER — POTASSIUM CHLORIDE 20 MEQ
10 PACKET (EA) ORAL
Refills: 0 | Status: COMPLETED | OUTPATIENT
Start: 2022-07-24 | End: 2022-07-24

## 2022-07-24 RX ADMIN — PANTOPRAZOLE SODIUM 40 MILLIGRAM(S): 20 TABLET, DELAYED RELEASE ORAL at 05:16

## 2022-07-24 RX ADMIN — SENNA PLUS 2 TABLET(S): 8.6 TABLET ORAL at 21:23

## 2022-07-24 RX ADMIN — Medication 100 MILLIEQUIVALENT(S): at 10:00

## 2022-07-24 RX ADMIN — TAMSULOSIN HYDROCHLORIDE 0.4 MILLIGRAM(S): 0.4 CAPSULE ORAL at 21:23

## 2022-07-24 RX ADMIN — Medication 100 MILLIEQUIVALENT(S): at 12:06

## 2022-07-24 RX ADMIN — DONEPEZIL HYDROCHLORIDE 5 MILLIGRAM(S): 10 TABLET, FILM COATED ORAL at 21:23

## 2022-07-24 RX ADMIN — Medication 100 MILLIEQUIVALENT(S): at 08:42

## 2022-07-24 RX ADMIN — Medication 1 APPLICATION(S): at 12:13

## 2022-07-24 RX ADMIN — Medication 6 CAPSULE(S): at 12:08

## 2022-07-24 RX ADMIN — Medication 6 CAPSULE(S): at 07:33

## 2022-07-24 RX ADMIN — ENOXAPARIN SODIUM 40 MILLIGRAM(S): 100 INJECTION SUBCUTANEOUS at 22:38

## 2022-07-24 RX ADMIN — SERTRALINE 12.5 MILLIGRAM(S): 25 TABLET, FILM COATED ORAL at 12:08

## 2022-07-24 RX ADMIN — Medication 500 MILLIGRAM(S): at 12:08

## 2022-07-24 RX ADMIN — Medication 2.5 MILLIGRAM(S): at 05:16

## 2022-07-24 RX ADMIN — Medication 6 CAPSULE(S): at 17:13

## 2022-07-24 NOTE — PROGRESS NOTE ADULT - ASSESSMENT
88 male with PMH dementia, pancreatic insufficiency, urinary incontinence, BPH, frequent falls (left hip fracture s/p ORIF Feb 2021), upper GIB/duodenal ulcers (dx'ed Feb 2021) presents from home with poor oral intake, weakness and B/L heel ulcers; found to be Covid 19 positive.     #COVID 19  - CXR negative, currently does not have any respiratory symptoms  - Tolerating room air, O2 sat 96%  - stopped Remdesivir and was not on decadron as pt not hypoxic  - Noted D-Dimer elevated - continue lovenox once daily due to hx GI bleed      #Bilateral Heel wounds  #Right heel, stage 3 pressure ulcer  #Left heel, DTI  #Right hip DTI  - s/p cefepime and vanco in ED, s/p zosyn; limited course  - bilateral foot x-ray to eval with no osteomyelitis, +osteoarthritis  - Wound care consult noted; cont local wound care  - Podiatry consult noted; no debridement recommended- cont local wound care and antibx  - nutritional support, cradle booties    #Failure to Thrive/weakness  #Dementia; severe   - Pt with poor po intake and profound weakness  - Continue home donepezil, sertraline   - SLP eval - recommended puree diet  - Encourage PO intake  - Palliative met with family at bedside (Son and daughter) --pt is a DNR/DNI now, no feeding tube    #Hypernatremia  - improved    #Hypokalemia  -K 2.9  today,   - Replete and repeat      #BPH  - Chronic condition  - Continue Flomax    #Pancreatic Insufficiency  - Chronic condition  - Continue home creon, prednisone  - As per wife pt on prednisone since 2011    #DVT ppx: lovenox    GOC:  DNR/DNI/ no feeding time . Palliative following    Dispo: PT recommending [facility]. Palliative following. Pt will most likely benefit from hospice evaluation .     7/24: Spoke with son Jv at [phone] and updated him on plan of care

## 2022-07-24 NOTE — PROGRESS NOTE ADULT - NS ATTEND AMEND GEN_ALL_CORE FT
88 non verbal male with PMH advanced dementia, pancreatic insufficiency, urinary incontinence, BPH, frequent falls (left hip fracture s/p ORIF Feb 2021), upper GIB secondary to duodenal ulcers (Feb 2021) presents from home with decreased oral intake and B/L heel wounds; found to be Covid 19 positive.   Heel deep tissue injuries: There is no evidence of OM on XR. cont wound care, cont  santyl.   Poor PO intake/FTT: no feeding tube, no heroic measures. Palliative approach. DNR.  I think hospice evaluation is very important for this patient. I discussed this w/ Dr. Mahan, 88 non verbal male with PMH advanced dementia, pancreatic insufficiency, urinary incontinence, BPH, frequent falls (left hip fracture s/p ORIF Feb 2021), upper GIB secondary to duodenal ulcers (Feb 2021) presents from home with decreased oral intake and B/L heel wounds; found to be Covid 19 positive.   Heel deep tissue injuries: There is no evidence of OM on XR. cont wound care, cont  santyl.   Poor PO intake/FTT: no feeding tube, no heroic measures. Palliative approach. DNR.  I think hospice evaluation is very important for this patient. I discussed this w/ Dr. Mahan, the patient's son, and his grandson, who will talk further with more family members about it.  In terms of COVID19, pt is asymptomatic.

## 2022-07-24 NOTE — PROGRESS NOTE ADULT - SUBJECTIVE AND OBJECTIVE BOX
Patient is a 88y old  Male who presents with a chief complaint of weakness/FTT, COVID (23 Jul 2022 09:42)  No acute issues overnight    Patient seen and examined at bedside.    ALLERGIES:  No Known Allergies    MEDICATIONS  (STANDING):  ascorbic acid 500 milliGRAM(s) Oral daily  collagenase Ointment 1 Application(s) Topical daily  collagenase Ointment 1 Application(s) Topical daily  dextrose 5%. 1000 milliLiter(s) (75 mL/Hr) IV Continuous <Continuous>  donepezil 5 milliGRAM(s) Oral at bedtime  enoxaparin Injectable 40 milliGRAM(s) SubCutaneous every 24 hours  pancrelipase  (CREON 12,000 Lipase Units) 6 Capsule(s) Oral three times a day with meals  pantoprazole    Tablet 40 milliGRAM(s) Oral before breakfast  potassium chloride  10 mEq/100 mL IVPB 10 milliEquivalent(s) IV Intermittent every 1 hour  predniSONE   Tablet 2.5 milliGRAM(s) Oral daily  senna 2 Tablet(s) Oral at bedtime  sertraline 12.5 milliGRAM(s) Oral daily  tamsulosin 0.4 milliGRAM(s) Oral at bedtime    MEDICATIONS  (PRN):  acetaminophen     Tablet .. 650 milliGRAM(s) Oral every 6 hours PRN Temp greater or equal to 38C (100.4F), Mild Pain (1 - 3)  aluminum hydroxide/magnesium hydroxide/simethicone Suspension 30 milliLiter(s) Oral every 4 hours PRN Dyspepsia  melatonin 3 milliGRAM(s) Oral at bedtime PRN Insomnia  ondansetron Injectable 4 milliGRAM(s) IV Push every 8 hours PRN Nausea and/or Vomiting    Vital Signs Last 24 Hrs  T(F): 97.6 (24 Jul 2022 04:22), Max: 99 (23 Jul 2022 20:53)  HR: 78 (24 Jul 2022 04:22) (78 - 83)  BP: 143/60 (24 Jul 2022 04:22) (141/68 - 166/79)  RR: 16 (24 Jul 2022 04:22) (16 - 16)  SpO2: 97% (24 Jul 2022 04:22) (96% - 97%)  I&O's Summary    23 Jul 2022 07:01  -  24 Jul 2022 07:00  --------------------------------------------------------  IN: 675 mL / OUT: 600 mL / NET: 75 mL    24 Jul 2022 07:01  -  24 Jul 2022 11:40  --------------------------------------------------------  IN: 130 mL / OUT: 0 mL / NET: 130 mL      BMI (kg/m2): 30.2 (07-19-22 @ 12:16)  PHYSICAL EXAM:  General: elderly, A+O x 1, Wolof speaking   ENT: MMM, no thrush  Neck: Supple, No JVD  Lungs: Non labored breathing, poor inspiratory effort   Cardio: RRR, S1/S2, no pitting edema bilaterally  Abdomen: Soft, Nontender, Nondistended; Bowel sounds present  Extremities: No calf tenderness, moves all extremities, bilateral heel ulcers wrapped     LABS:                        10.5   4.93  )-----------( 293      ( 24 Jul 2022 06:50 )             31.8       07-24    140  |  106  |  12  ----------------------------<  102  2.9   |  29  |  0.58    Ca    6.9      24 Jul 2022 06:50    TPro  5.2  /  Alb  1.4  /  TBili  0.4  /  DBili  0.1  /  AST  36  /  ALT  21  /  AlkPhos  75  07-24       PT/INR - ( 24 Jul 2022 06:50 )   PT: 16.0 sec;   INR: 1.37 ratio                                        Culture - Urine (collected 19 Jul 2022 16:10)  Source: Clean Catch Clean Catch (Midstream)  Final Report (20 Jul 2022 20:59):    No growth    Culture - Blood (collected 19 Jul 2022 13:15)  Source: .Blood Blood-Peripheral  Preliminary Report (20 Jul 2022 19:02):    No growth to date.    Culture - Blood (collected 19 Jul 2022 13:15)  Source: .Blood Blood-Peripheral  Preliminary Report (20 Jul 2022 19:02):    No growth to date.          RADIOLOGY & ADDITIONAL TESTS:    Care Discussed with Consultants/Other Providers:

## 2022-07-24 NOTE — PROGRESS NOTE ADULT - CONVERSATION/DISCUSSION
Diagnosis/Prognosis/MOLST Discussed/Treatment Options
Diagnosis/Prognosis/Treatment Options/Hospice Referral/Palliative Care Referral

## 2022-07-24 NOTE — PROGRESS NOTE ADULT - CONVERSATION DETAILS
Case reviewed with son and daughter.  They are aware that he has an acute covid infection and now has minimal po intake.  They have had family members in the past that have had similar problems.  Eventually with these patients the family opted to stop all treatment and allow a natural death.  DNR/I, artificial nutrition and hydration were discussed.  They do not want any feeding tube.  They would like to continue with iv hydration for several days.   We also briefly discussed hospice.  Daughter works at [facility] [employer] and would prefer that if there is no improvement that her father would go there for either rehab or comfort care
Spoke with patient's cousin Earline who is staying with the patient for the last 2 months, helping his wife Kayla to care for patient. Unclear who is the HCP.     Earline would prefer that patient's wife Kayla be involved in GOC discussions, however Kayla is not home right now. I provided my cell phone number for callback
The patient has had progressive decline with decrease in PO intake.   I did not recommend a feeding tube, as it has shown not to improve quality of life or mortality. I did not recommend a rehab referral for the patient - I do not feel like he has good rehab potential given his limited participation with me in the hospital.     The most important thing to the the son and his grandson is for the patient to be comfortable, no aggressive interventions. No intubations or feeding tube. They will talk further with patient's other daughter and patient's wife to discuss hospice referral.

## 2022-07-25 LAB
ALBUMIN SERPL ELPH-MCNC: 1.4 G/DL — LOW (ref 3.3–5)
ALP SERPL-CCNC: 79 U/L — SIGNIFICANT CHANGE UP (ref 40–120)
ALT FLD-CCNC: 20 U/L — SIGNIFICANT CHANGE UP (ref 10–45)
ANION GAP SERPL CALC-SCNC: 8 MMOL/L — SIGNIFICANT CHANGE UP (ref 5–17)
AST SERPL-CCNC: 30 U/L — SIGNIFICANT CHANGE UP (ref 10–40)
BILIRUB DIRECT SERPL-MCNC: 0.1 MG/DL — SIGNIFICANT CHANGE UP (ref 0–0.3)
BILIRUB INDIRECT FLD-MCNC: 0.3 MG/DL — SIGNIFICANT CHANGE UP (ref 0.2–1)
BILIRUB SERPL-MCNC: 0.4 MG/DL — SIGNIFICANT CHANGE UP (ref 0.2–1.2)
BUN SERPL-MCNC: 12 MG/DL — SIGNIFICANT CHANGE UP (ref 7–23)
CALCIUM SERPL-MCNC: 7.2 MG/DL — LOW (ref 8.4–10.5)
CHLORIDE SERPL-SCNC: 107 MMOL/L — SIGNIFICANT CHANGE UP (ref 96–108)
CO2 SERPL-SCNC: 26 MMOL/L — SIGNIFICANT CHANGE UP (ref 22–31)
CREAT SERPL-MCNC: 0.63 MG/DL — SIGNIFICANT CHANGE UP (ref 0.5–1.3)
CREAT SERPL-MCNC: 0.63 MG/DL — SIGNIFICANT CHANGE UP (ref 0.5–1.3)
EGFR: 91 ML/MIN/1.73M2 — SIGNIFICANT CHANGE UP
EGFR: 91 ML/MIN/1.73M2 — SIGNIFICANT CHANGE UP
GLUCOSE SERPL-MCNC: 104 MG/DL — HIGH (ref 70–99)
INR BLD: 1.27 RATIO — HIGH (ref 0.88–1.16)
MAGNESIUM SERPL-MCNC: 1.8 MG/DL — SIGNIFICANT CHANGE UP (ref 1.6–2.6)
POTASSIUM SERPL-MCNC: 3.2 MMOL/L — LOW (ref 3.5–5.3)
POTASSIUM SERPL-SCNC: 3.2 MMOL/L — LOW (ref 3.5–5.3)
PROT SERPL-MCNC: 5.5 G/DL — LOW (ref 6–8.3)
PROTHROM AB SERPL-ACNC: 14.8 SEC — HIGH (ref 10.5–13.4)
SODIUM SERPL-SCNC: 141 MMOL/L — SIGNIFICANT CHANGE UP (ref 135–145)

## 2022-07-25 PROCEDURE — 99233 SBSQ HOSP IP/OBS HIGH 50: CPT

## 2022-07-25 RX ORDER — DOXAZOSIN MESYLATE 4 MG
1 TABLET ORAL AT BEDTIME
Refills: 0 | Status: DISCONTINUED | OUTPATIENT
Start: 2022-07-25 | End: 2022-08-02

## 2022-07-25 RX ORDER — POTASSIUM CHLORIDE 20 MEQ
10 PACKET (EA) ORAL
Refills: 0 | Status: COMPLETED | OUTPATIENT
Start: 2022-07-25 | End: 2022-07-25

## 2022-07-25 RX ADMIN — Medication 1 MILLIGRAM(S): at 22:30

## 2022-07-25 RX ADMIN — SENNA PLUS 2 TABLET(S): 8.6 TABLET ORAL at 22:31

## 2022-07-25 RX ADMIN — Medication 500 MILLIGRAM(S): at 11:04

## 2022-07-25 RX ADMIN — PANTOPRAZOLE SODIUM 40 MILLIGRAM(S): 20 TABLET, DELAYED RELEASE ORAL at 05:09

## 2022-07-25 RX ADMIN — Medication 1 APPLICATION(S): at 11:03

## 2022-07-25 RX ADMIN — Medication 100 MILLIEQUIVALENT(S): at 12:33

## 2022-07-25 RX ADMIN — Medication 100 MILLIEQUIVALENT(S): at 14:02

## 2022-07-25 RX ADMIN — Medication 100 MILLIEQUIVALENT(S): at 11:01

## 2022-07-25 RX ADMIN — Medication 6 CAPSULE(S): at 12:35

## 2022-07-25 RX ADMIN — ENOXAPARIN SODIUM 40 MILLIGRAM(S): 100 INJECTION SUBCUTANEOUS at 22:31

## 2022-07-25 RX ADMIN — Medication 2.5 MILLIGRAM(S): at 05:09

## 2022-07-25 RX ADMIN — DONEPEZIL HYDROCHLORIDE 5 MILLIGRAM(S): 10 TABLET, FILM COATED ORAL at 22:30

## 2022-07-25 RX ADMIN — Medication 6 CAPSULE(S): at 17:52

## 2022-07-25 RX ADMIN — SERTRALINE 12.5 MILLIGRAM(S): 25 TABLET, FILM COATED ORAL at 11:04

## 2022-07-25 RX ADMIN — Medication 6 CAPSULE(S): at 09:11

## 2022-07-25 NOTE — PROGRESS NOTE ADULT - SUBJECTIVE AND OBJECTIVE BOX
Patient is a 88y old  Male who presents with a chief complaint of weakness/ftt (24 Jul 2022 11:40)    Ate dinner w family yesterday   Patient seen and examined at bedside.    ALLERGIES:  No Known Allergies    MEDICATIONS  (STANDING):  ascorbic acid 500 milliGRAM(s) Oral daily  collagenase Ointment 1 Application(s) Topical daily  collagenase Ointment 1 Application(s) Topical daily  dextrose 5%. 1000 milliLiter(s) (75 mL/Hr) IV Continuous <Continuous>  donepezil 5 milliGRAM(s) Oral at bedtime  doxazosin 1 milliGRAM(s) Oral at bedtime  enoxaparin Injectable 40 milliGRAM(s) SubCutaneous every 24 hours  pancrelipase  (CREON 12,000 Lipase Units) 6 Capsule(s) Oral three times a day with meals  pantoprazole    Tablet 40 milliGRAM(s) Oral before breakfast  potassium chloride  10 mEq/100 mL IVPB 10 milliEquivalent(s) IV Intermittent every 1 hour  predniSONE   Tablet 2.5 milliGRAM(s) Oral daily  senna 2 Tablet(s) Oral at bedtime  sertraline 12.5 milliGRAM(s) Oral daily    MEDICATIONS  (PRN):  acetaminophen     Tablet .. 650 milliGRAM(s) Oral every 6 hours PRN Temp greater or equal to 38C (100.4F), Mild Pain (1 - 3)  aluminum hydroxide/magnesium hydroxide/simethicone Suspension 30 milliLiter(s) Oral every 4 hours PRN Dyspepsia  melatonin 3 milliGRAM(s) Oral at bedtime PRN Insomnia  ondansetron Injectable 4 milliGRAM(s) IV Push every 8 hours PRN Nausea and/or Vomiting    Vital Signs Last 24 Hrs  T(F): 98.3 (25 Jul 2022 05:14), Max: 99.1 (24 Jul 2022 20:26)  HR: 75 (25 Jul 2022 05:14) (75 - 91)  BP: 156/78 (25 Jul 2022 05:14) (142/66 - 159/61)  RR: 16 (25 Jul 2022 05:14) (16 - 16)  SpO2: 95% (25 Jul 2022 05:14) (94% - 99%)  I&O's Summary    24 Jul 2022 07:01  -  25 Jul 2022 07:00  --------------------------------------------------------  IN: 130 mL / OUT: 0 mL / NET: 130 mL        PHYSICAL EXAM:  General: elderly, A+O x 1, Albanian speaking   ENT: MMM, no thrush  Neck: Supple, No JVD  Lungs: Non labored breathing,  Clear to auscultation bilaterally,   Cardio: RRR, S1/S2, no pitting edema bilaterally  Abdomen: Soft, Nontender, Nondistended; Bowel sounds present  Extremities: No calf tenderness, bilateral heel ulcer wrapped     LABS:                        10.5   4.93  )-----------( 293      ( 24 Jul 2022 06:50 )             31.8       07-25    141  |  107  |  12  ----------------------------<  104  3.2   |  26  |  0.63    Ca    7.2      25 Jul 2022 07:43  Mg     1.8     07-25    TPro  5.5  /  Alb  1.4  /  TBili  0.4  /  DBili  0.1  /  AST  30  /  ALT  20  /  AlkPhos  79  07-25       PT/INR - ( 25 Jul 2022 07:43 )   PT: 14.8 sec;   INR: 1.27 ratio                                        Culture - Urine (collected 19 Jul 2022 16:10)  Source: Clean Catch Clean Catch (Midstream)  Final Report (20 Jul 2022 20:59):    No growth    Culture - Blood (collected 19 Jul 2022 13:15)  Source: .Blood Blood-Peripheral  Final Report (24 Jul 2022 19:01):    No Growth Final    Culture - Blood (collected 19 Jul 2022 13:15)  Source: .Blood Blood-Peripheral  Final Report (24 Jul 2022 19:01):    No Growth Final          RADIOLOGY & ADDITIONAL TESTS:    Care Discussed with Consultants/Other Providers:

## 2022-07-25 NOTE — PROGRESS NOTE ADULT - ASSESSMENT
88 male with PMH dementia, pancreatic insufficiency, urinary incontinence, BPH, frequent falls (left hip fracture s/p ORIF Feb 2021), upper GIB/duodenal ulcers (dx'ed Feb 2021) presents from home with poor oral intake, weakness and B/L heel ulcers; found to be Covid 19 positive.     #COVID 19  - CXR negative, currently does not have any respiratory symptoms  - Tolerating room air, O2 sat 96%  - stopped Remdesivir and was not on decadron as pt not hypoxic  - Noted D-Dimer elevated - continue lovenox once daily due to hx GI bleed      #Bilateral Heel wounds  #Right heel, stage 3 pressure ulcer  #Left heel, DTI  #Right hip DTI  - s/p cefepime and vanco in ED, s/p zosyn; limited course  - bilateral foot x-ray to eval with no osteomyelitis, +osteoarthritis  - Wound care consult noted; cont local wound care  - Podiatry consult noted; no debridement recommended- cont local wound care and antibx  - nutritional support, cradle booties    #Failure to Thrive/weakness  #Dementia; severe   - Pt with poor po intake and profound weakness  - Continue home donepezil, sertraline   - SLP eval - recommended puree diet  - Encourage PO intake  - Palliative: Imet with family at bedside (Son and daughter) --pt is a DNR/DNI ..  Not in    #Hypernatremia  - improved    #Hypokalemia  -K 3.2,   - Replete and repeat    #BPH  - Chronic condition  - Continue Flomax    #Pancreatic Insufficiency  - Chronic condition  - Continue home creon, prednisone  - As per wife pt on prednisone since 2011    #DVT ppx: lovenox    GOC:  DNR/DNI/ no feeding time . Palliative following    Dispo: PT recommending [facility]  Palliative:   Pt not able to return home on hospice.  He is not inpatient hospice appropriate.  We recommend [facility] with eventual transition to comfort care  7/25: Spoke with daughter Agustina

## 2022-07-25 NOTE — CHART NOTE - NSCHARTNOTEFT_GEN_A_CORE
Nutrition Follow Up Note  Hospital Course (Per Electronic Medical Record):   Source: Medical Record [X] Patient [X] Family [X] Nursing Staff [X]     Diet: Pureed , mld thickened liquids     Patient requires  total assistance with feeding po intake varies, patient consumes ~ % of meals for family however with staff patient consumes minimal , As per RN family requesting Hospice services. palliative note reviewed , family not receptive to EN feeds. Dx of Moderate Malnutrition noted ,providing Magic Cup BID (580cal/18gprotein )Multiple pressure injuries noted may benefit from a  MVI added to aid with wound healing     Current Weight: No follow up weight since admission noted .     Pertinent Medications: MEDICATIONS  (STANDING):  ascorbic acid 500 milliGRAM(s) Oral daily  collagenase Ointment 1 Application(s) Topical daily  collagenase Ointment 1 Application(s) Topical daily  dextrose 5%. 1000 milliLiter(s) (75 mL/Hr) IV Continuous <Continuous>  donepezil 5 milliGRAM(s) Oral at bedtime  doxazosin 1 milliGRAM(s) Oral at bedtime  enoxaparin Injectable 40 milliGRAM(s) SubCutaneous every 24 hours  pancrelipase  (CREON 12,000 Lipase Units) 6 Capsule(s) Oral three times a day with meals  pantoprazole    Tablet 40 milliGRAM(s) Oral before breakfast  potassium chloride  10 mEq/100 mL IVPB 10 milliEquivalent(s) IV Intermittent every 1 hour  predniSONE   Tablet 2.5 milliGRAM(s) Oral daily  senna 2 Tablet(s) Oral at bedtime  sertraline 12.5 milliGRAM(s) Oral daily    MEDICATIONS  (PRN):  acetaminophen     Tablet .. 650 milliGRAM(s) Oral every 6 hours PRN Temp greater or equal to 38C (100.4F), Mild Pain (1 - 3)  aluminum hydroxide/magnesium hydroxide/simethicone Suspension 30 milliLiter(s) Oral every 4 hours PRN Dyspepsia  melatonin 3 milliGRAM(s) Oral at bedtime PRN Insomnia  ondansetron Injectable 4 milliGRAM(s) IV Push every 8 hours PRN Nausea and/or Vomiting      Pertinent Labs:  07-25 Na141 mmol/L Glu 104 mg/dL<H> K+ 3.2 mmol/L<L> Cr  0.63 mg/dL BUN 12 mg/dL 07-25 Alb 1.4 g/dL<L>        Skin:  Right:,heel, Stage III,  Left:,heel, Suspected deep tissue injury, Right:,Hip, Suspected deep tissue injury    Edema: none     Last BM: (7/21)     Estimated Needs:   [X] No Change since Previous Assessment    Previous Nutrition Diagnosis: Moderate Malnutrition     Nutrition Diagnosis is [X] Ongoing       New Nutrition Diagnosis: [X] Not Applicable      Interventions:   1. continue current nutrition regimen   2. request current weight     Monitoring & Evaluation: will monitor:  [X] Weights   [X] PO Intake   [X] Follow Up (Per Protocol)  [X] Tolerance to Diet Prescription       RD to follow as per Nutrition protocol  Dinorah Amos RDN

## 2022-07-25 NOTE — PROGRESS NOTE ADULT - SUBJECTIVE AND OBJECTIVE BOX
Progress:  No significant improvement    Present Symptoms:    Unable to obtain due to poor mentation    MEDICATIONS  (STANDING):  ascorbic acid 500 milliGRAM(s) Oral daily  collagenase Ointment 1 Application(s) Topical daily  collagenase Ointment 1 Application(s) Topical daily  dextrose 5%. 1000 milliLiter(s) (75 mL/Hr) IV Continuous <Continuous>  donepezil 5 milliGRAM(s) Oral at bedtime  doxazosin 1 milliGRAM(s) Oral at bedtime  enoxaparin Injectable 40 milliGRAM(s) SubCutaneous every 24 hours  pancrelipase  (CREON 12,000 Lipase Units) 6 Capsule(s) Oral three times a day with meals  pantoprazole    Tablet 40 milliGRAM(s) Oral before breakfast  potassium chloride  10 mEq/100 mL IVPB 10 milliEquivalent(s) IV Intermittent every 1 hour  predniSONE   Tablet 2.5 milliGRAM(s) Oral daily  senna 2 Tablet(s) Oral at bedtime  sertraline 12.5 milliGRAM(s) Oral daily    MEDICATIONS  (PRN):  acetaminophen     Tablet .. 650 milliGRAM(s) Oral every 6 hours PRN Temp greater or equal to 38C (100.4F), Mild Pain (1 - 3)  aluminum hydroxide/magnesium hydroxide/simethicone Suspension 30 milliLiter(s) Oral every 4 hours PRN Dyspepsia  melatonin 3 milliGRAM(s) Oral at bedtime PRN Insomnia  ondansetron Injectable 4 milliGRAM(s) IV Push every 8 hours PRN Nausea and/or Vomiting      PHYSICAL EXAM:  Vital Signs Last 24 Hrs  T(C): 36.8 (25 Jul 2022 05:14), Max: 37.3 (24 Jul 2022 20:26)  T(F): 98.3 (25 Jul 2022 05:14), Max: 99.1 (24 Jul 2022 20:26)  HR: 75 (25 Jul 2022 05:14) (75 - 91)  BP: 156/78 (25 Jul 2022 05:14) (142/66 - 159/61)  BP(mean): --  RR: 16 (25 Jul 2022 05:14) (16 - 16)  SpO2: 95% (25 Jul 2022 05:14) (94% - 99%)    Parameters below as of 25 Jul 2022 05:14  Patient On (Oxygen Delivery Method): room air      General: alert  oriented x _0___      HEENT: normal    Lungs: comfortable   CV: normal    GI: normal    : normal    Musculoskeletal: bedbound  Skin: normal    Neuro: severe dementia  Oral intake ability:  oral feeding  Diet: NPO,     LABS:                          10.5   4.93  )-----------( 293      ( 24 Jul 2022 06:50 )             31.8     07-25    141  |  107  |  12  ----------------------------<  104<H>  3.2<L>   |  26  |  0.63    Ca    7.2<L>      25 Jul 2022 07:43  Mg     1.8     07-25    TPro  5.5<L>  /  Alb  1.4<L>  /  TBili  0.4  /  DBili  0.1  /  AST  30  /  ALT  20  /  AlkPhos  79  07-25        RADIOLOGY & ADDITIONAL STUDIES:    ADVANCE DIRECTIVES:dnr/i  Advanced Care Planning discussion total time spent:  20 min

## 2022-07-25 NOTE — PROGRESS NOTE ADULT - NS ATTEND AMEND GEN_ALL_CORE FT
88 non verbal male with PMH advanced dementia, pancreatic insufficiency, urinary incontinence, BPH, frequent falls (left hip fracture s/p ORIF Feb 2021), upper GIB secondary to duodenal ulcers (Feb 2021) presents from home with decreased oral intake and B/L heel wounds; found to be Covid 19 positive.   Heel deep tissue injuries: There is no evidence of OM on XR. cont wound care, cont  santyl.   Poor PO intake/FTT: no feeding tube, no heroic measures. Palliative approach. DNR. Seen by Palliative care, recommendations for [facility] w/ eventual transition to comfort care. Pt unable to return home on hospice. 88 non verbal male with PMH advanced dementia, pancreatic insufficiency, urinary incontinence, BPH, frequent falls (left hip fracture s/p ORIF Feb 2021), upper GIB secondary to duodenal ulcers (Feb 2021) presents from home with decreased oral intake and B/L heel wounds; found to be Covid 19 positive.   Heel deep tissue injuries: There is no evidence of OM on XR. cont wound care, cont  santyl.   Poor PO intake/FTT: no feeding tube, no heroic measures. Palliative approach. DNR. Seen by Palliative care, recommendations for [facility] w/ eventual transition to comfort care. Pt unable to return home on hospice.  BPH: tamsulosin cannot be crushed, so changed to cardura. 88 non verbal male with PMH advanced dementia, pancreatic insufficiency, urinary incontinence, BPH, frequent falls (left hip fracture s/p ORIF Feb 2021), upper GIB secondary to duodenal ulcers (Feb 2021) presents from home with decreased oral intake and B/L heel wounds; found to be Covid 19 positive.   Heel deep tissue injuries: There is no evidence of OM on XR. cont wound care, cont  santyl.   Poor PO intake/FTT: no feeding tube, no heroic measures. Palliative approach. DNR. Seen by Palliative care, I discussed w/ Dr. Mahan. recommendations for [facility] w/ eventual transition to comfort care. Pt unable to return home on hospice.  BPH: tamsulosin cannot be crushed, so changed to cardura.

## 2022-07-26 LAB
ALBUMIN SERPL ELPH-MCNC: 1.6 G/DL — LOW (ref 3.3–5)
ALP SERPL-CCNC: 84 U/L — SIGNIFICANT CHANGE UP (ref 40–120)
ALT FLD-CCNC: 18 U/L — SIGNIFICANT CHANGE UP (ref 10–45)
ANION GAP SERPL CALC-SCNC: 9 MMOL/L — SIGNIFICANT CHANGE UP (ref 5–17)
AST SERPL-CCNC: 26 U/L — SIGNIFICANT CHANGE UP (ref 10–40)
BILIRUB DIRECT SERPL-MCNC: 0.2 MG/DL — SIGNIFICANT CHANGE UP (ref 0–0.3)
BILIRUB INDIRECT FLD-MCNC: 0.4 MG/DL — SIGNIFICANT CHANGE UP (ref 0.2–1)
BILIRUB SERPL-MCNC: 0.6 MG/DL — SIGNIFICANT CHANGE UP (ref 0.2–1.2)
BUN SERPL-MCNC: 12 MG/DL — SIGNIFICANT CHANGE UP (ref 7–23)
CALCIUM SERPL-MCNC: 7.5 MG/DL — LOW (ref 8.4–10.5)
CHLORIDE SERPL-SCNC: 105 MMOL/L — SIGNIFICANT CHANGE UP (ref 96–108)
CO2 SERPL-SCNC: 26 MMOL/L — SIGNIFICANT CHANGE UP (ref 22–31)
CREAT SERPL-MCNC: 0.72 MG/DL — SIGNIFICANT CHANGE UP (ref 0.5–1.3)
CREAT SERPL-MCNC: 0.72 MG/DL — SIGNIFICANT CHANGE UP (ref 0.5–1.3)
EGFR: 88 ML/MIN/1.73M2 — SIGNIFICANT CHANGE UP
EGFR: 88 ML/MIN/1.73M2 — SIGNIFICANT CHANGE UP
GLUCOSE SERPL-MCNC: 110 MG/DL — HIGH (ref 70–99)
HCT VFR BLD CALC: 37 % — LOW (ref 39–50)
HGB BLD-MCNC: 12.1 G/DL — LOW (ref 13–17)
INR BLD: 1.23 RATIO — HIGH (ref 0.88–1.16)
MCHC RBC-ENTMCNC: 29.9 PG — SIGNIFICANT CHANGE UP (ref 27–34)
MCHC RBC-ENTMCNC: 32.7 GM/DL — SIGNIFICANT CHANGE UP (ref 32–36)
MCV RBC AUTO: 91.4 FL — SIGNIFICANT CHANGE UP (ref 80–100)
NRBC # BLD: 0 /100 WBCS — SIGNIFICANT CHANGE UP (ref 0–0)
PLATELET # BLD AUTO: 363 K/UL — SIGNIFICANT CHANGE UP (ref 150–400)
POTASSIUM SERPL-MCNC: 3.9 MMOL/L — SIGNIFICANT CHANGE UP (ref 3.5–5.3)
POTASSIUM SERPL-SCNC: 3.9 MMOL/L — SIGNIFICANT CHANGE UP (ref 3.5–5.3)
PROT SERPL-MCNC: 6.1 G/DL — SIGNIFICANT CHANGE UP (ref 6–8.3)
PROTHROM AB SERPL-ACNC: 14.3 SEC — HIGH (ref 10.5–13.4)
RBC # BLD: 4.05 M/UL — LOW (ref 4.2–5.8)
RBC # FLD: 13.2 % — SIGNIFICANT CHANGE UP (ref 10.3–14.5)
SODIUM SERPL-SCNC: 140 MMOL/L — SIGNIFICANT CHANGE UP (ref 135–145)
WBC # BLD: 7.22 K/UL — SIGNIFICANT CHANGE UP (ref 3.8–10.5)
WBC # FLD AUTO: 7.22 K/UL — SIGNIFICANT CHANGE UP (ref 3.8–10.5)

## 2022-07-26 PROCEDURE — 99233 SBSQ HOSP IP/OBS HIGH 50: CPT

## 2022-07-26 PROCEDURE — 99232 SBSQ HOSP IP/OBS MODERATE 35: CPT

## 2022-07-26 RX ADMIN — PANTOPRAZOLE SODIUM 40 MILLIGRAM(S): 20 TABLET, DELAYED RELEASE ORAL at 05:14

## 2022-07-26 RX ADMIN — Medication 1 APPLICATION(S): at 12:10

## 2022-07-26 RX ADMIN — Medication 6 CAPSULE(S): at 17:04

## 2022-07-26 RX ADMIN — Medication 1 MILLIGRAM(S): at 22:39

## 2022-07-26 RX ADMIN — Medication 6 CAPSULE(S): at 12:02

## 2022-07-26 RX ADMIN — Medication 500 MILLIGRAM(S): at 12:06

## 2022-07-26 RX ADMIN — DONEPEZIL HYDROCHLORIDE 5 MILLIGRAM(S): 10 TABLET, FILM COATED ORAL at 22:39

## 2022-07-26 RX ADMIN — ENOXAPARIN SODIUM 40 MILLIGRAM(S): 100 INJECTION SUBCUTANEOUS at 22:39

## 2022-07-26 RX ADMIN — SERTRALINE 12.5 MILLIGRAM(S): 25 TABLET, FILM COATED ORAL at 12:06

## 2022-07-26 RX ADMIN — SENNA PLUS 2 TABLET(S): 8.6 TABLET ORAL at 22:39

## 2022-07-26 RX ADMIN — Medication 6 CAPSULE(S): at 07:45

## 2022-07-26 RX ADMIN — Medication 2.5 MILLIGRAM(S): at 05:14

## 2022-07-26 NOTE — PROGRESS NOTE ADULT - SUBJECTIVE AND OBJECTIVE BOX
Patient is a 88y old  Male who presents with a chief complaint of ftt/covid (25 Jul 2022 12:55)      Patient seen and examined at bedside. No overnight events reported.     ALLERGIES:  No Known Allergies    MEDICATIONS  (STANDING):  ascorbic acid 500 milliGRAM(s) Oral daily  collagenase Ointment 1 Application(s) Topical daily  collagenase Ointment 1 Application(s) Topical daily  dextrose 5%. 1000 milliLiter(s) (75 mL/Hr) IV Continuous <Continuous>  donepezil 5 milliGRAM(s) Oral at bedtime  doxazosin 1 milliGRAM(s) Oral at bedtime  enoxaparin Injectable 40 milliGRAM(s) SubCutaneous every 24 hours  pancrelipase  (CREON 12,000 Lipase Units) 6 Capsule(s) Oral three times a day with meals  pantoprazole    Tablet 40 milliGRAM(s) Oral before breakfast  predniSONE   Tablet 2.5 milliGRAM(s) Oral daily  senna 2 Tablet(s) Oral at bedtime  sertraline 12.5 milliGRAM(s) Oral daily    MEDICATIONS  (PRN):  acetaminophen     Tablet .. 650 milliGRAM(s) Oral every 6 hours PRN Temp greater or equal to 38C (100.4F), Mild Pain (1 - 3)  aluminum hydroxide/magnesium hydroxide/simethicone Suspension 30 milliLiter(s) Oral every 4 hours PRN Dyspepsia  melatonin 3 milliGRAM(s) Oral at bedtime PRN Insomnia  ondansetron Injectable 4 milliGRAM(s) IV Push every 8 hours PRN Nausea and/or Vomiting    Vital Signs Last 24 Hrs  T(F): 99 (26 Jul 2022 05:47), Max: 99.3 (25 Jul 2022 20:45)  HR: 89 (26 Jul 2022 05:47) (81 - 89)  BP: 141/69 (26 Jul 2022 05:47) (141/69 - 163/73)  RR: 18 (26 Jul 2022 05:47) (15 - 18)  SpO2: 99% (26 Jul 2022 05:47) (96% - 99%)  I&O's Summary    25 Jul 2022 07:01  -  26 Jul 2022 07:00  --------------------------------------------------------  IN: 0 mL / OUT: 200 mL / NET: -200 mL      PHYSICAL EXAM:  General: NAD, Alert, nonverbal.  ENT: No gross hearing impairment, poor dentition, no thrush  Neck: Supple, No JVD  Lungs: Clear to auscultation bilaterally, good air entry, non-labored breathing  Cardio: RRR, S1/S2, No murmur  Abdomen: Soft, Nontender, Nondistended; Bowel sounds present  Extremities: No calf tenderness, No cyanosis, No pitting edema  Skin:  multiple decubitus wounds, b/l heel ulcers dressed  Neuro;  alert, unable to follow commands    LABS:                        12.1   7.22  )-----------( 363      ( 26 Jul 2022 06:19 )             37.0     07-26    140  |  105  |  12  ----------------------------<  110  3.9   |  26  |  0.72    Ca    7.5      26 Jul 2022 06:19  Mg     1.8     07-25    TPro  6.1  /  Alb  1.6  /  TBili  0.6  /  DBili  0.2  /  AST  26  /  ALT  18  /  AlkPhos  84  07-26          PT/INR - ( 26 Jul 2022 06:19 )   PT: 14.3 sec;   INR: 1.23 ratio                                         Culture - Urine (collected 19 Jul 2022 16:10)  Source: Clean Catch Clean Catch (Midstream)  Final Report (20 Jul 2022 20:59):    No growth    Culture - Blood (collected 19 Jul 2022 13:15)  Source: .Blood Blood-Peripheral  Final Report (24 Jul 2022 19:01):    No Growth Final    Culture - Blood (collected 19 Jul 2022 13:15)  Source: .Blood Blood-Peripheral  Final Report (24 Jul 2022 19:01):    No Growth Final        RADIOLOGY & ADDITIONAL TESTS:    Care Discussed with Consultants/Other Providers:

## 2022-07-26 NOTE — PROGRESS NOTE ADULT - SUBJECTIVE AND OBJECTIVE BOX
Progress: continues with poor appetite.  Minimally responsive.    Present Symptoms:   Dyspnea: n  Nausea/Vomiting: n  Anxiety:  n  Depressed Mood: y  Fatigue: n  Loss of appetite: y  Pain:  no apparent                  MEDICATIONS  (STANDING):  ascorbic acid 500 milliGRAM(s) Oral daily  collagenase Ointment 1 Application(s) Topical daily  collagenase Ointment 1 Application(s) Topical daily  dextrose 5%. 1000 milliLiter(s) (75 mL/Hr) IV Continuous <Continuous>  donepezil 5 milliGRAM(s) Oral at bedtime  doxazosin 1 milliGRAM(s) Oral at bedtime  enoxaparin Injectable 40 milliGRAM(s) SubCutaneous every 24 hours  pancrelipase  (CREON 12,000 Lipase Units) 6 Capsule(s) Oral three times a day with meals  pantoprazole    Tablet 40 milliGRAM(s) Oral before breakfast  predniSONE   Tablet 2.5 milliGRAM(s) Oral daily  senna 2 Tablet(s) Oral at bedtime  sertraline 12.5 milliGRAM(s) Oral daily    MEDICATIONS  (PRN):  acetaminophen     Tablet .. 650 milliGRAM(s) Oral every 6 hours PRN Temp greater or equal to 38C (100.4F), Mild Pain (1 - 3)  aluminum hydroxide/magnesium hydroxide/simethicone Suspension 30 milliLiter(s) Oral every 4 hours PRN Dyspepsia  melatonin 3 milliGRAM(s) Oral at bedtime PRN Insomnia  ondansetron Injectable 4 milliGRAM(s) IV Push every 8 hours PRN Nausea and/or Vomiting      PHYSICAL EXAM:  Vital Signs Last 24 Hrs  T(C): 37.2 (26 Jul 2022 05:47), Max: 37.4 (25 Jul 2022 20:45)  T(F): 99 (26 Jul 2022 05:47), Max: 99.3 (25 Jul 2022 20:45)  HR: 89 (26 Jul 2022 05:47) (81 - 89)  BP: 141/69 (26 Jul 2022 05:47) (141/69 - 163/73)  BP(mean): --  RR: 18 (26 Jul 2022 05:47) (15 - 18)  SpO2: 99% (26 Jul 2022 05:47) (96% - 99%)    Parameters below as of 26 Jul 2022 05:47  Patient On (Oxygen Delivery Method): room air      General: alert  oriented x ___0.  opens eyes to verbal stimuli.  Occasinally mumbles.      HEENT: normal    Lungs: comfortable   CV: normal    GI: normal    : normal    Musculoskeletal: normal   Skin: normal    Neuro: profound weekness  Oral intake ability:  oral feeding  Diet: NPO,     LABS:                          12.1   7.22  )-----------( 363      ( 26 Jul 2022 06:19 )             37.0     07-26    140  |  105  |  12  ----------------------------<  110<H>  3.9   |  26  |  0.72    Ca    7.5<L>      26 Jul 2022 06:19  Mg     1.8     07-25    TPro  6.1  /  Alb  1.6<L>  /  TBili  0.6  /  DBili  0.2  /  AST  26  /  ALT  18  /  AlkPhos  84  07-26        RADIOLOGY & ADDITIONAL STUDIES:    ADVANCE DIRECTIVES:  Advanced Care Planning discussion total time spent:

## 2022-07-26 NOTE — PROGRESS NOTE ADULT - NS ATTEND AMEND GEN_ALL_CORE FT
88 non verbal male with PMH advanced dementia, pancreatic insufficiency, urinary incontinence, BPH, frequent falls (left hip fracture s/p ORIF Feb 2021), upper GIB secondary to duodenal ulcers (Feb 2021) presents from home with decreased oral intake and B/L heel wounds; found to be Covid 19 positive.   Heel deep tissue injuries: There is no evidence of OM on XR. cont wound care, cont  santyl.   Poor PO intake/FTT: no feeding tube, no heroic measures. Palliative approach. DNR. Seen by Palliative care, I discussed w/ Dr. Mahan. recommendations for [facility] w/ eventual transition to comfort care. Pt unable to return home on hospice.  BPH: tamsulosin cannot be crushed, so changed to cardura.

## 2022-07-26 NOTE — PROGRESS NOTE ADULT - ASSESSMENT
88 male with PMH dementia, pancreatic insufficiency, urinary incontinence, BPH, frequent falls (left hip fracture s/p ORIF Feb 2021), upper GIB/duodenal ulcers (dx'ed Feb 2021) presents from home with poor oral intake, weakness and B/L heel ulcers; found to be Covid 19 positive.     #COVID 19  - CXR negative, currently does not have any respiratory symptoms  - Tolerating room air, O2 sat 96%  - stopped Remdesivir and was not on decadron as pt not hypoxic  - Noted D-Dimer elevated - continue lovenox once daily due to hx GI bleed      #Bilateral Heel wounds  #Right heel, stage 3 pressure ulcer  #Left heel, DTI  #Right hip DTI  - s/p limited course of antibx  - bilateral foot x-ray: no osteomyelitis, +osteoarthritis  - Wound care consult noted; cont local wound care  - Podiatry consult noted; no debridement recommended- cont local wound care   - nutritional support, cradle booties    #Failure to Thrive/weakness  #Dementia; severe   - Pt with poor po intake and profound weakness  - Continue home donepezil, sertraline   - SLP eval - recommended puree diet  - Encourage PO intake  - Palliative met with family at bedside (Son and daughter) --pt is a DNR/DNI now, no feeding tube    #Hypernatremia; resolved    #Hypokalemia; resolved  - s/p repletion    #BPH  - Chronic condition  - Continue Flomax    #Pancreatic Insufficiency  - Chronic condition  - Continue home creon, prednisone  - As per wife pt on prednisone since 2011    #DVT ppx: lovenox    GOC:  DNR/DNI/ no feeding time . Palliative following    Dispo: PT recommending [facility] Palliative following. Hospice eval placed.       88 male with PMH dementia, pancreatic insufficiency, urinary incontinence, BPH, frequent falls (left hip fracture s/p ORIF Feb 2021), upper GIB/duodenal ulcers (dx'ed Feb 2021) presents from home with poor oral intake, weakness and B/L heel ulcers; found to be Covid 19 positive.     #COVID 19  - CXR negative, currently does not have any respiratory symptoms  - Tolerating room air, O2 sat 96%  - stopped Remdesivir and was not on decadron as pt not hypoxic  - Noted D-Dimer elevated - continue lovenox once daily due to hx GI bleed      #Bilateral Heel wounds  #Right heel, stage 3 pressure ulcer  #Left heel, DTI  #Right hip DTI  - s/p limited course of antibx  - bilateral foot x-ray: no osteomyelitis, +osteoarthritis  - Wound care consult noted; cont local wound care  - Podiatry consult noted; no debridement recommended- cont local wound care   - nutritional support, cradle booties    #Failure to Thrive/weakness  #Dementia; severe   - Pt with poor po intake and profound weakness  - Continue home donepezil, sertraline   - SLP eval - recommended puree diet  - Encourage PO intake  - Palliative met with family at bedside (Son and daughter) --pt is a DNR/DNI now, no feeding tube    #Hypernatremia; resolved    #Hypokalemia; resolved  - s/p repletion    #BPH  - Chronic condition  - Continue Flomax    #Pancreatic Insufficiency  - Chronic condition  - Continue home creon, prednisone  - As per wife pt on prednisone since 2011    #DVT ppx: lovenox    GOC:  DNR/DNI/ no feeding time . Palliative following    Dispo: PT recommending [facility] Palliative following.

## 2022-07-26 NOTE — PROGRESS NOTE ADULT - ASSESSMENT
88 male with PMH dementia, pancreatic insufficiency, urinary incontinence, BPH, frequent falls (left hip fracture s/p ORIF Feb 2021), upper GIB/duodenal ulcers (dx'ed Feb 2021) presents from home with poor oral intake, weakness and B/L heel ulcers; found to be Covid 19 positive.     #COVID 19  - CXR negative, currently does not have any respiratory symptoms  - Tolerating room air, O2 sat 96%  - stopped Remdesivir and was not on decadron as pt not hypoxic  - Noted D-Dimer elevated - continue lovenox once daily due to hx GI bleed      #Bilateral Heel wounds  #Right heel, stage 3 pressure ulcer  #Left heel, DTI  #Right hip DTI  - s/p limited course of antibx  - bilateral foot x-ray: no osteomyelitis, +osteoarthritis  - Wound care consult noted; cont local wound care  - Podiatry consult noted; no debridement recommended- cont local wound care   - nutritional support, cradle booties    #Failure to Thrive/weakness  #Dementia; severe   - Pt with poor po intake and profound weakness  - Continue home donepezil, sertraline   - SLP eval - recommended puree diet  - Encourage PO intake  - Palliative:  awaiting placement at [facility]    #Hypernatremia; resolved    #Hypokalemia; resolved  - s/p repletion    #BPH  - Chronic condition  - Continue Flomax    #Pancreatic Insufficiency  - Chronic condition  - Continue home creon, prednisone  - As per wife pt on prednisone since 2011    #DVT ppx: lovenox    GOC:  DNR/DNI/ no feeding time . Palliative following    Dispo: PT recommending [facility] Palliative following.

## 2022-07-27 ENCOUNTER — TRANSCRIPTION ENCOUNTER (OUTPATIENT)
Age: 87
End: 2022-07-27

## 2022-07-27 LAB
ALBUMIN SERPL ELPH-MCNC: 1.4 G/DL — LOW (ref 3.3–5)
ALP SERPL-CCNC: 71 U/L — SIGNIFICANT CHANGE UP (ref 40–120)
ALT FLD-CCNC: 16 U/L — SIGNIFICANT CHANGE UP (ref 10–45)
AST SERPL-CCNC: 19 U/L — SIGNIFICANT CHANGE UP (ref 10–40)
BILIRUB DIRECT SERPL-MCNC: 0.1 MG/DL — SIGNIFICANT CHANGE UP (ref 0–0.3)
BILIRUB INDIRECT FLD-MCNC: 0.3 MG/DL — SIGNIFICANT CHANGE UP (ref 0.2–1)
BILIRUB SERPL-MCNC: 0.4 MG/DL — SIGNIFICANT CHANGE UP (ref 0.2–1.2)
CREAT SERPL-MCNC: 0.7 MG/DL — SIGNIFICANT CHANGE UP (ref 0.5–1.3)
EGFR: 89 ML/MIN/1.73M2 — SIGNIFICANT CHANGE UP
INR BLD: 1.18 RATIO — HIGH (ref 0.88–1.16)
PROT SERPL-MCNC: 5.7 G/DL — LOW (ref 6–8.3)
PROTHROM AB SERPL-ACNC: 13.7 SEC — HIGH (ref 10.5–13.4)

## 2022-07-27 PROCEDURE — 99233 SBSQ HOSP IP/OBS HIGH 50: CPT

## 2022-07-27 PROCEDURE — 99232 SBSQ HOSP IP/OBS MODERATE 35: CPT

## 2022-07-27 RX ADMIN — SERTRALINE 12.5 MILLIGRAM(S): 25 TABLET, FILM COATED ORAL at 11:54

## 2022-07-27 RX ADMIN — ENOXAPARIN SODIUM 40 MILLIGRAM(S): 100 INJECTION SUBCUTANEOUS at 23:00

## 2022-07-27 RX ADMIN — SENNA PLUS 2 TABLET(S): 8.6 TABLET ORAL at 22:56

## 2022-07-27 RX ADMIN — Medication 500 MILLIGRAM(S): at 11:54

## 2022-07-27 RX ADMIN — Medication 6 CAPSULE(S): at 17:02

## 2022-07-27 RX ADMIN — Medication 1 MILLIGRAM(S): at 22:56

## 2022-07-27 RX ADMIN — Medication 6 CAPSULE(S): at 08:05

## 2022-07-27 RX ADMIN — DONEPEZIL HYDROCHLORIDE 5 MILLIGRAM(S): 10 TABLET, FILM COATED ORAL at 22:56

## 2022-07-27 RX ADMIN — Medication 1 APPLICATION(S): at 11:53

## 2022-07-27 RX ADMIN — Medication 6 CAPSULE(S): at 11:52

## 2022-07-27 NOTE — PROGRESS NOTE ADULT - ASSESSMENT
88 male with PMH dementia, pancreatic insufficiency, urinary incontinence, BPH, frequent falls (left hip fracture s/p ORIF Feb 2021), upper GIB/duodenal ulcers (dx'ed Feb 2021) presents from home with poor oral intake, weakness and B/L heel ulcers; found to be Covid 19 positive.     #COVID 19  - CXR negative, currently does not have any respiratory symptoms  - Tolerating room air, O2 sat 96%  - stopped Remdesivir and was not on decadron as pt not hypoxic  - Noted D-Dimer elevated - continue lovenox once daily due to hx GI bleed      #Bilateral Heel wounds  #Right heel, stage 3 pressure ulcer  #Left heel, DTI  #Right hip DTI  - s/p limited course of antibx  - bilateral foot x-ray: no osteomyelitis, +osteoarthritis  - Wound care consult noted; cont local wound care  - Podiatry consult noted; no debridement recommended- cont local wound care   - nutritional support, cradle booties    #Failure to Thrive/weakness  #Dementia; severe   - Pt with poor po intake and profound weakness  - Continue home donepezil, sertraline   - SLP eval - recommended puree diet  - Encourage PO intake  - Palliative met with family at bedside (Son and daughter) --pt is a DNR/DNI now, no feeding tube    #Hypernatremia; resolved    #Hypokalemia; resolved  - s/p repletion    #BPH  - Chronic condition  - Continue Flomax    #Pancreatic Insufficiency  - Chronic condition  - Continue home creon, prednisone  -  #DVT ppx: lovenox    GOC:  DNR/DNI/ no feeding tube  Dispo: PT recommending [facility] . Family in agreement.  They prefer [facility]

## 2022-07-27 NOTE — DISCHARGE NOTE PROVIDER - NSDCFUADDINST_GEN_ALL_CORE_FT
Daily normal saline cleanse of bilateral heel wounds, pat dry. Apply Cavillon film barrier to periwounds (including posterior L heel DTI), apply Santyl ointment, cover with Allevyn foam daily. Cradle booties to bilateral heels  Daily normal saline cleanse of Right Hip wound, pat dry. Apply cavillon film barrier to periwound (including proximal DTI) daily. Apply Santyl ointment to wound bed & cover with Allevyn foam daily.      Offload all bony prominences with strict Turn & Position at least every 2 hours. Suggest cradle booties bilaterally to offload heels at all times while in bed.

## 2022-07-27 NOTE — DISCHARGE NOTE PROVIDER - CARE PROVIDER_API CALL
Eduar Hernández)  Family Medicine; Geriatric Medicine  [address] [place] [address]  Phone: [phone]  Fax: [phone]  Follow Up Time:    Eduar Hernández)  Family Medicine; Geriatric Medicine  [address] [place] [address]  Phone: [phone]  Fax: [phone]  Follow Up Time:     Juanito Wiseman)  Family Medicine  [address] [place] [id number]  Phone: [phone]  Fax: [phone]  Follow Up Time:

## 2022-07-27 NOTE — DISCHARGE NOTE PROVIDER - DETAILS OF MALNUTRITION DIAGNOSIS/DIAGNOSES
This patient has been assessed with a concern for Malnutrition and was treated during this hospitalization for the following Nutrition diagnosis/diagnoses:     -  07/22/2022: Moderate protein-calorie malnutrition   -  07/20/2022: Moderate protein-calorie malnutrition

## 2022-07-27 NOTE — PROGRESS NOTE ADULT - SUBJECTIVE AND OBJECTIVE BOX
Progress: No significant improvement    Present Symptoms: no apparent discomfort   Unable to obtain due to poor mentation    MEDICATIONS  (STANDING):  ascorbic acid 500 milliGRAM(s) Oral daily  collagenase Ointment 1 Application(s) Topical daily  collagenase Ointment 1 Application(s) Topical daily  dextrose 5%. 1000 milliLiter(s) (75 mL/Hr) IV Continuous <Continuous>  donepezil 5 milliGRAM(s) Oral at bedtime  doxazosin 1 milliGRAM(s) Oral at bedtime  enoxaparin Injectable 40 milliGRAM(s) SubCutaneous every 24 hours  pancrelipase  (CREON 12,000 Lipase Units) 6 Capsule(s) Oral three times a day with meals  pantoprazole    Tablet 40 milliGRAM(s) Oral before breakfast  predniSONE   Tablet 2.5 milliGRAM(s) Oral daily  senna 2 Tablet(s) Oral at bedtime  sertraline 12.5 milliGRAM(s) Oral daily    MEDICATIONS  (PRN):  acetaminophen     Tablet .. 650 milliGRAM(s) Oral every 6 hours PRN Temp greater or equal to 38C (100.4F), Mild Pain (1 - 3)  aluminum hydroxide/magnesium hydroxide/simethicone Suspension 30 milliLiter(s) Oral every 4 hours PRN Dyspepsia  melatonin 3 milliGRAM(s) Oral at bedtime PRN Insomnia  ondansetron Injectable 4 milliGRAM(s) IV Push every 8 hours PRN Nausea and/or Vomiting      PHYSICAL EXAM:  Vital Signs Last 24 Hrs  T(C): 36.4 (27 Jul 2022 04:51), Max: 37.2 (26 Jul 2022 20:21)  T(F): 97.6 (27 Jul 2022 04:51), Max: 99 (26 Jul 2022 20:21)  HR: 65 (27 Jul 2022 04:51) (65 - 92)  BP: 116/42 (27 Jul 2022 04:51) (116/42 - 118/55)  BP(mean): --  RR: 18 (27 Jul 2022 04:51) (13 - 18)  SpO2: 100% (27 Jul 2022 04:51) (96% - 100%)    Parameters below as of 27 Jul 2022 04:51  Patient On (Oxygen Delivery Method): room air      General: alert  oriented x __1__      HEENT: normal    Lungs: comfortable   CV: normal    GI: normal    : normal    Musculoskeletal: normal   Skin: normal    Neuro: severe cognitive disfunction  Oral intake ability:  oral feeding       LABS:                          12.1   7.22  )-----------( 363      ( 26 Jul 2022 06:19 )             37.0     07-27    x   |  x   |  x   ----------------------------<  x   x    |  x   |  0.70    Ca    7.5<L>      26 Jul 2022 06:19    TPro  5.7<L>  /  Alb  1.4<L>  /  TBili  0.4  /  DBili  0.1  /  AST  19  /  ALT  16  /  AlkPhos  71  07-27        RADIOLOGY & ADDITIONAL STUDIES:    ADVANCE DIRECTIVES:  Advanced Care Planning discussion total time spent:

## 2022-07-27 NOTE — PROGRESS NOTE ADULT - NS ATTEND AMEND GEN_ALL_CORE FT
88 non verbal male with PMH advanced dementia, pancreatic insufficiency, urinary incontinence, BPH, frequent falls (left hip fracture s/p ORIF Feb 2021), upper GIB secondary to duodenal ulcers (Feb 2021) presents from home with decreased oral intake and B/L heel wounds; found to be Covid 19 positive.   Heel deep tissue injuries: There is no evidence of OM on XR. S/p short course of abx. Cont wound care, cont  santyl.   Poor PO intake/FTT: no feeding tube, no heroic measures. Palliative approach. DNR. Seen by Palliative care, I discussed w/ Dr. Mahan. recommendations for [facility].   BPH: tamsulosin cannot be crushed, so changed to cardura.

## 2022-07-27 NOTE — DISCHARGE NOTE PROVIDER - NSDCMRMEDTOKEN_GEN_ALL_CORE_FT
donepezil 5 mg oral tablet: 1 tab(s) orally once a day (at bedtime)  Myrbetriq 25 mg oral tablet, extended release: 1 tab(s) orally once a day  tamsulosin 0.4 mg oral capsule: 1 cap(s) orally once a day  Zenpep 25,000 units-79,000 units-105,000 units oral delayed release capsule: 3 cap(s) orally 3 times a day   collagenase 250 units/g topical ointment: 1 application topically once a day  collagenase 250 units/g topical ointment: 1 application topically once a day  donepezil 5 mg oral tablet: 1 tab(s) orally once a day (at bedtime)  Myrbetriq 25 mg oral tablet, extended release: 1 tab(s) orally once a day  predniSONE 2.5 mg oral tablet: 1 tab(s) orally once a day  sertraline 25 mg oral tablet: 0.5 tab(s) orally once a day  tamsulosin 0.4 mg oral capsule: 1 cap(s) orally once a day  Zenpep 25,000 units-79,000 units-105,000 units oral delayed release capsule: 3 cap(s) orally 3 times a day

## 2022-07-27 NOTE — DISCHARGE NOTE PROVIDER - NSDCCPCAREPLAN_GEN_ALL_CORE_FT
PRINCIPAL DISCHARGE DIAGNOSIS  Diagnosis: Weakness  Assessment and Plan of Treatment: Diagnosis Generalized weakness due to Covid 19 -  Treated with redemsivir for 2 days and supportive care with covid precautions  You had a speach and swallow eval and are now on puree diet with thicken liquids         SECONDARY DISCHARGE DIAGNOSES  Diagnosis: 2019 novel coronavirus disease (COVID-19)  Assessment and Plan of Treatment:

## 2022-07-27 NOTE — DISCHARGE NOTE PROVIDER - CARE PROVIDERS DIRECT ADDRESSES
[email or web address]

## 2022-07-27 NOTE — DISCHARGE NOTE PROVIDER - HOSPITAL COURSE
88 male with PMH dementia, pancreatic insufficiency, urinary incontinence, BPH, frequent falls (left hip fracture s/p ORIF Feb 2021), upper GIB (Feb 2021 - upper endoscopy revealed duodenal ulcer) presents from home with decreased oral intake for three days.  Spoke with family who reports pt has been having profound weakness, usually capable to stand up on his own but currently unable to do so.  Of note pt found to be covid positive in ED, no respiratory symptoms.  Essentially sent from home with failure to thrive, dehydration and progressive functional decline.      In ED labs significant for Leukocytosis WBC 14.25, Hypernatremia Na 149.  Pt appears very dry, dehydrated, Covid positive.  CTH in ED negative for bleed, CXR w/o consolidation or infiltrates.  Bilateral heel ulcers R>L + purulent drainage. Admitted to hospitalist for further management. Treated with IV Zosyn.  Podiatry consulted and rec no surgical intervention at this time.  Rec offloading to bilateral heels.  Wound care recs provided     PT consulted and rec [facility]  Discharged to ______ when off Covid quarantine     Discharging provider: Veronica MCNAMARA   [facility] Provider:  Code status: DNR        **RESULTS**  < from: CT Head No Cont (07.19.22 @ 15:18) >    IMPRESSION:    No acute hemorrhage, mass effect or extra-axial collections. Unchanged   ventriculomegaly which may be on the basis of normal pressure   hydrocephalus.    --- End of Report ---        < end of copied text >         88 male with PMH dementia, pancreatic insufficiency, urinary incontinence, BPH, frequent falls (left hip fracture s/p ORIF Feb 2021), upper GIB (Feb 2021 - upper endoscopy revealed duodenal ulcer) presents from home with decreased oral intake for three days.  Spoke with family who reports pt has been having profound weakness, usually capable to stand up on his own but currently unable to do so.  Of note pt found to be covid positive in ED, no respiratory symptoms.  Essentially sent from home with failure to thrive, dehydration and progressive functional decline.      In ED labs significant for Leukocytosis WBC 14.25, Hypernatremia Na 149.  Pt appears very dry, dehydrated, Covid positive.  CTH in ED negative for bleed, CXR w/o consolidation or infiltrates.  Bilateral heel ulcers R>L + purulent drainage. Admitted to hospitalist for further management. Treated with IV Zosyn.  Podiatry consulted and rec no surgical intervention at this time.  Rec offloading to bilateral heels.  Wound care recs provided     PT consulted and rec [facility]  Discharged to [facility] when off Covid quarantine     Discharging provider:Shirin MCNAMARA [id number]  [facility] Provider:  Code status: DNR        **RESULTS**  < from: CT Head No Cont (07.19.22 @ 15:18) >    IMPRESSION:    No acute hemorrhage, mass effect or extra-axial collections. Unchanged   ventriculomegaly which may be on the basis of normal pressure   hydrocephalus.    --- End of Report ---               88 male with PMH dementia, pancreatic insufficiency, urinary incontinence, BPH, frequent falls (left hip fracture s/p ORIF Feb 2021), upper GIB (Feb 2021 - upper endoscopy revealed duodenal ulcer) presents from home with decreased oral intake for three days.  Spoke with family who reports pt has been having profound weakness, usually capable to stand up on his own but currently unable to do so.  Of note pt found to be covid positive in ED, no respiratory symptoms.  Essentially sent from home with failure to thrive, dehydration and progressive functional decline.      In ED labs significant for Leukocytosis WBC 14.25, Hypernatremia Na 149.  Pt appears very dry, dehydrated, Covid positive.  CTH in ED negative for bleed, CXR w/o consolidation or infiltrates.  Bilateral heel ulcers R>L + purulent drainage. Admitted to hospitalist for further management. Treated with IV Zosyn.  Podiatry consulted and rec no surgical intervention at this time.  Rec offloading to bilateral heels.  Wound care recs provided     Continue home donepezil, sertraline. SLP eval - recommended puree diet with thicken liquids           PT consulted and rec [facility]          Discharging provider: Aram Tian   SNF Provider: Dr. Wiseman notified   Code status: DNR/DNI, no feeding tube.         **RESULTS**  < from: CT Head No Cont (07.19.22 @ 15:18) >    IMPRESSION:    No acute hemorrhage, mass effect or extra-axial collections. Unchanged   ventriculomegaly which may be on the basis of normal pressure   hydrocephalus.    --- End of Report ---               87 y/o male with PMH dementia, pancreatic insufficiency, urinary incontinence, BPH, frequent falls (left hip fracture s/p ORIF Feb 2021), upper GIB (Feb 2021 - upper endoscopy revealed duodenal ulcer) presents from home with decreased oral intake for three days.  Spoke with family who reports pt has been having profound weakness, usually capable to stand up on his own but currently unable to do so.  Of note pt found to be covid positive in ED, no respiratory symptoms.  Essentially sent from home with failure to thrive, dehydration and progressive functional decline.      In ED labs significant for Leukocytosis WBC 14.25, Hypernatremia Na 149.  Pt appears very dry, dehydrated, Covid positive. CTH in ED negative for bleed, CXR w/o consolidation or infiltrates.  Bilateral heel ulcers R>L + purulent drainage. Admitted to hospitalist for further management. Treated with IV Zosyn.  Podiatry consulted and rec no surgical intervention at this time.  Rec offloading to bilateral heels.  Wound care recs provided. Continue home donepezil, sertraline. SLP eval - recommended puree diet with thicken liquids     PT consulted and rec [facility]. Goals of care ongoing, recommend outpatient follow up for transition to hospice.     Discharging provider: Aram Tian, KHOI; Umm Avila DO  SNF Provider: Dr. Wiseman notified   Code status: DNR/DNI, no feeding tube    **RESULTS**  < from: CT Head No Cont (07.19.22 @ 15:18) >  IMPRESSION: No acute hemorrhage, mass effect or extra-axial collections. Unchanged ventriculomegaly which may be on the basis of normal pressure hydrocephalus.  --- End of Report ---    Time Spent: 45 minutes

## 2022-07-27 NOTE — PROGRESS NOTE ADULT - ASSESSMENT
88 male with PMH dementia, pancreatic insufficiency, urinary incontinence, BPH, frequent falls (left hip fracture s/p ORIF Feb 2021), upper GIB/duodenal ulcers (dx'ed Feb 2021) presents from home with poor oral intake, weakness and B/L heel ulcers; found to be Covid 19 positive.     #COVID 19  - CXR negative, currently does not have any respiratory symptoms  - Tolerating room air, O2 sat 96%  - stopped Remdesivir and was not on decadron as pt not hypoxic  - Noted D-Dimer elevated - continue lovenox once daily due to hx GI bleed      #Bilateral Heel wounds  #Right heel, stage 3 pressure ulcer  #Left heel, DTI  #Right hip DTI  - s/p limited course of antibx  - bilateral foot x-ray: no osteomyelitis, +osteoarthritis  - Wound care consult noted; cont local wound care  - Podiatry consult noted; no debridement recommended- cont local wound care   - nutritional support, cradle booties    #Failure to Thrive/weakness  #Dementia; severe   - Pt with poor po intake and profound weakness  - Continue home donepezil, sertraline   - SLP eval - recommended puree diet  - Encourage PO intake  - Palliative met with family at bedside (Son and daughter) --pt is a DNR/DNI now, no feeding tube    #Hypernatremia; resolved    #Hypokalemia; resolved  - s/p repletion    #BPH  - Chronic condition  - Continue Flomax    #Pancreatic Insufficiency  - Chronic condition  - Continue home creon, prednisone  - As per wife pt on prednisone since 2011    #DVT ppx: lovenox    GOC:  DNR/DNI/ no feeding time . Palliative following    Dispo: PT recommending [facility] Palliative following.    7/27: Called son Jv at [phone], no answer

## 2022-07-27 NOTE — DISCHARGE NOTE PROVIDER - PROVIDER TOKENS
PROVIDER:[TOKEN:[[id number]]] PROVIDER:[TOKEN:[[id number]]],PROVIDER:[TOKEN:[[id number]]]

## 2022-07-27 NOTE — PROGRESS NOTE ADULT - SUBJECTIVE AND OBJECTIVE BOX
Patient is a 88y old  Male who presents with a chief complaint of ftt (26 Jul 2022 14:28)  No acute issues overnight   Patient seen and examined at bedside.    ALLERGIES:  No Known Allergies    MEDICATIONS  (STANDING):  ascorbic acid 500 milliGRAM(s) Oral daily  collagenase Ointment 1 Application(s) Topical daily  collagenase Ointment 1 Application(s) Topical daily  dextrose 5%. 1000 milliLiter(s) (75 mL/Hr) IV Continuous <Continuous>  donepezil 5 milliGRAM(s) Oral at bedtime  doxazosin 1 milliGRAM(s) Oral at bedtime  enoxaparin Injectable 40 milliGRAM(s) SubCutaneous every 24 hours  pancrelipase  (CREON 12,000 Lipase Units) 6 Capsule(s) Oral three times a day with meals  pantoprazole    Tablet 40 milliGRAM(s) Oral before breakfast  predniSONE   Tablet 2.5 milliGRAM(s) Oral daily  senna 2 Tablet(s) Oral at bedtime  sertraline 12.5 milliGRAM(s) Oral daily    MEDICATIONS  (PRN):  acetaminophen     Tablet .. 650 milliGRAM(s) Oral every 6 hours PRN Temp greater or equal to 38C (100.4F), Mild Pain (1 - 3)  aluminum hydroxide/magnesium hydroxide/simethicone Suspension 30 milliLiter(s) Oral every 4 hours PRN Dyspepsia  melatonin 3 milliGRAM(s) Oral at bedtime PRN Insomnia  ondansetron Injectable 4 milliGRAM(s) IV Push every 8 hours PRN Nausea and/or Vomiting    Vital Signs Last 24 Hrs  T(F): 97.6 (27 Jul 2022 04:51), Max: 99 (26 Jul 2022 20:21)  HR: 65 (27 Jul 2022 04:51) (65 - 92)  BP: 116/42 (27 Jul 2022 04:51) (116/42 - 118/55)  RR: 18 (27 Jul 2022 04:51) (13 - 18)  SpO2: 100% (27 Jul 2022 04:51) (96% - 100%)  I&O's Summary    26 Jul 2022 07:01  -  27 Jul 2022 07:00  --------------------------------------------------------  IN: 375 mL / OUT: 250 mL / NET: 125 mL        PHYSICAL EXAM:  General: alert, elderly, [place] speaking A+O x 1  ENT: MMM, no thrush  Neck: Supple, No JVD  Lungs: Non labored breathing,  poor inspiratory effort   Cardio: RRR, S1/S2,  no pitting edema bilaterally  Abdomen: Soft, Nontender, Nondistended; Bowel sounds present  Extremities: No calf tenderness, moves all extremities    LABS:                        12.1   7.22  )-----------( 363      ( 26 Jul 2022 06:19 )             37.0       07-27    x   |  x   |  x   ----------------------------<  x   x    |  x   |  0.70    Ca    7.5      26 Jul 2022 06:19  Mg     1.8     07-25    TPro  5.7  /  Alb  1.4  /  TBili  0.4  /  DBili  0.1  /  AST  19  /  ALT  16  /  AlkPhos  71  07-27       PT/INR - ( 27 Jul 2022 07:21 )   PT: 13.7 sec;   INR: 1.18 ratio                                            RADIOLOGY & ADDITIONAL TESTS:    Care Discussed with Consultants/Other Providers:

## 2022-07-28 LAB
ALBUMIN SERPL ELPH-MCNC: 1.5 G/DL — LOW (ref 3.3–5)
ALP SERPL-CCNC: 77 U/L — SIGNIFICANT CHANGE UP (ref 40–120)
ALT FLD-CCNC: 18 U/L — SIGNIFICANT CHANGE UP (ref 10–45)
ANION GAP SERPL CALC-SCNC: 9 MMOL/L — SIGNIFICANT CHANGE UP (ref 5–17)
AST SERPL-CCNC: 28 U/L — SIGNIFICANT CHANGE UP (ref 10–40)
BILIRUB DIRECT SERPL-MCNC: 0.1 MG/DL — SIGNIFICANT CHANGE UP (ref 0–0.3)
BILIRUB INDIRECT FLD-MCNC: 0.2 MG/DL — SIGNIFICANT CHANGE UP (ref 0.2–1)
BILIRUB SERPL-MCNC: 0.3 MG/DL — SIGNIFICANT CHANGE UP (ref 0.2–1.2)
BUN SERPL-MCNC: 12 MG/DL — SIGNIFICANT CHANGE UP (ref 7–23)
CALCIUM SERPL-MCNC: 7.9 MG/DL — LOW (ref 8.4–10.5)
CHLORIDE SERPL-SCNC: 105 MMOL/L — SIGNIFICANT CHANGE UP (ref 96–108)
CO2 SERPL-SCNC: 29 MMOL/L — SIGNIFICANT CHANGE UP (ref 22–31)
CREAT SERPL-MCNC: 0.63 MG/DL — SIGNIFICANT CHANGE UP (ref 0.5–1.3)
CREAT SERPL-MCNC: 0.71 MG/DL — SIGNIFICANT CHANGE UP (ref 0.5–1.3)
EGFR: 88 ML/MIN/1.73M2 — SIGNIFICANT CHANGE UP
EGFR: 92 ML/MIN/1.73M2 — SIGNIFICANT CHANGE UP
GLUCOSE SERPL-MCNC: 87 MG/DL — SIGNIFICANT CHANGE UP (ref 70–99)
INR BLD: 1.09 RATIO — SIGNIFICANT CHANGE UP (ref 0.88–1.16)
POTASSIUM SERPL-MCNC: 4 MMOL/L — SIGNIFICANT CHANGE UP (ref 3.5–5.3)
POTASSIUM SERPL-SCNC: 4 MMOL/L — SIGNIFICANT CHANGE UP (ref 3.5–5.3)
PROT SERPL-MCNC: 6 G/DL — SIGNIFICANT CHANGE UP (ref 6–8.3)
PROTHROM AB SERPL-ACNC: 12.6 SEC — SIGNIFICANT CHANGE UP (ref 10.5–13.4)
SODIUM SERPL-SCNC: 143 MMOL/L — SIGNIFICANT CHANGE UP (ref 135–145)

## 2022-07-28 PROCEDURE — 99232 SBSQ HOSP IP/OBS MODERATE 35: CPT

## 2022-07-28 RX ADMIN — DONEPEZIL HYDROCHLORIDE 5 MILLIGRAM(S): 10 TABLET, FILM COATED ORAL at 22:42

## 2022-07-28 RX ADMIN — Medication 1 APPLICATION(S): at 12:23

## 2022-07-28 RX ADMIN — Medication 500 MILLIGRAM(S): at 12:23

## 2022-07-28 RX ADMIN — Medication 1 APPLICATION(S): at 12:24

## 2022-07-28 RX ADMIN — Medication 6 CAPSULE(S): at 12:23

## 2022-07-28 RX ADMIN — Medication 6 CAPSULE(S): at 08:12

## 2022-07-28 RX ADMIN — Medication 6 CAPSULE(S): at 17:54

## 2022-07-28 RX ADMIN — Medication 1 MILLIGRAM(S): at 22:43

## 2022-07-28 RX ADMIN — SENNA PLUS 2 TABLET(S): 8.6 TABLET ORAL at 22:43

## 2022-07-28 RX ADMIN — SERTRALINE 12.5 MILLIGRAM(S): 25 TABLET, FILM COATED ORAL at 12:23

## 2022-07-28 RX ADMIN — ENOXAPARIN SODIUM 40 MILLIGRAM(S): 100 INJECTION SUBCUTANEOUS at 22:53

## 2022-07-28 NOTE — PROGRESS NOTE ADULT - SUBJECTIVE AND OBJECTIVE BOX
Progress: No improvement    Present Symptoms:    Unable to obtain due to poor mentation    MEDICATIONS  (STANDING):  ascorbic acid 500 milliGRAM(s) Oral daily  collagenase Ointment 1 Application(s) Topical daily  collagenase Ointment 1 Application(s) Topical daily  dextrose 5%. 1000 milliLiter(s) (75 mL/Hr) IV Continuous <Continuous>  donepezil 5 milliGRAM(s) Oral at bedtime  doxazosin 1 milliGRAM(s) Oral at bedtime  enoxaparin Injectable 40 milliGRAM(s) SubCutaneous every 24 hours  pancrelipase  (CREON 12,000 Lipase Units) 6 Capsule(s) Oral three times a day with meals  pantoprazole    Tablet 40 milliGRAM(s) Oral before breakfast  predniSONE   Tablet 2.5 milliGRAM(s) Oral daily  senna 2 Tablet(s) Oral at bedtime  sertraline 12.5 milliGRAM(s) Oral daily    MEDICATIONS  (PRN):  acetaminophen     Tablet .. 650 milliGRAM(s) Oral every 6 hours PRN Temp greater or equal to 38C (100.4F), Mild Pain (1 - 3)  aluminum hydroxide/magnesium hydroxide/simethicone Suspension 30 milliLiter(s) Oral every 4 hours PRN Dyspepsia  melatonin 3 milliGRAM(s) Oral at bedtime PRN Insomnia  ondansetron Injectable 4 milliGRAM(s) IV Push every 8 hours PRN Nausea and/or Vomiting      PHYSICAL EXAM:  Vital Signs Last 24 Hrs  T(C): 36.7 (28 Jul 2022 05:27), Max: 37.2 (27 Jul 2022 18:20)  T(F): 98 (28 Jul 2022 05:27), Max: 98.9 (27 Jul 2022 18:20)  HR: 78 (28 Jul 2022 05:27) (78 - 91)  BP: 124/65 (28 Jul 2022 05:27) (123/69 - 138/73)  BP(mean): --  RR: 14 (28 Jul 2022 05:27) (12 - 14)  SpO2: 97% (28 Jul 2022 05:27) (96% - 97%)    Parameters below as of 28 Jul 2022 05:27  Patient On (Oxygen Delivery Method): room air      General: alert  oriented x _1___      HEENT: normal    Lungs: comfortable   CV: normal    GI: normal    : normal    Musculoskeletal: normal   Skin: normal    Neuro: no improvement  Oral intake ability:  oral feeding      LABS:      07-28    143  |  105  |  12  ----------------------------<  87  4.0   |  29  |  0.71    Ca    7.9<L>      28 Jul 2022 06:51    TPro  6.0  /  Alb  1.5<L>  /  TBili  0.3  /  DBili  0.1  /  AST  28  /  ALT  18  /  AlkPhos  77  07-28        RADIOLOGY & ADDITIONAL STUDIES:    ADVANCE DIRECTIVES:  Advanced Care Planning discussion total time spent:

## 2022-07-28 NOTE — PROGRESS NOTE ADULT - NS ATTEND AMEND GEN_ALL_CORE FT
88 non verbal male with PMH advanced dementia, pancreatic insufficiency, urinary incontinence, BPH, frequent falls (left hip fracture s/p ORIF Feb 2021), upper GIB secondary to duodenal ulcers (Feb 2021) presents from home with decreased oral intake and B/L heel wounds; found to be Covid 19 positive. No evidence of heel OM. completed short course of abx. Cont wound care, cont  santyl.   Poor PO intake/FTT: no feeding tube, no heroic measures. Palliative approach. DNR. Seen by Palliative care. recommendations for [facility].   BPH: tamsulosin cannot be crushed, so changed to cardura.

## 2022-07-28 NOTE — PROGRESS NOTE ADULT - SUBJECTIVE AND OBJECTIVE BOX
Patient is a 88y old  Male who presents with a chief complaint of ftt (27 Jul 2022 13:42)  No acute issues overnight     Patient seen and examined at bedside.    ALLERGIES:  No Known Allergies    MEDICATIONS  (STANDING):  ascorbic acid 500 milliGRAM(s) Oral daily  collagenase Ointment 1 Application(s) Topical daily  collagenase Ointment 1 Application(s) Topical daily  dextrose 5%. 1000 milliLiter(s) (75 mL/Hr) IV Continuous <Continuous>  donepezil 5 milliGRAM(s) Oral at bedtime  doxazosin 1 milliGRAM(s) Oral at bedtime  enoxaparin Injectable 40 milliGRAM(s) SubCutaneous every 24 hours  pancrelipase  (CREON 12,000 Lipase Units) 6 Capsule(s) Oral three times a day with meals  pantoprazole    Tablet 40 milliGRAM(s) Oral before breakfast  predniSONE   Tablet 2.5 milliGRAM(s) Oral daily  senna 2 Tablet(s) Oral at bedtime  sertraline 12.5 milliGRAM(s) Oral daily    MEDICATIONS  (PRN):  acetaminophen     Tablet .. 650 milliGRAM(s) Oral every 6 hours PRN Temp greater or equal to 38C (100.4F), Mild Pain (1 - 3)  aluminum hydroxide/magnesium hydroxide/simethicone Suspension 30 milliLiter(s) Oral every 4 hours PRN Dyspepsia  melatonin 3 milliGRAM(s) Oral at bedtime PRN Insomnia  ondansetron Injectable 4 milliGRAM(s) IV Push every 8 hours PRN Nausea and/or Vomiting    Vital Signs Last 24 Hrs  T(F): 98 (28 Jul 2022 05:27), Max: 98.9 (27 Jul 2022 18:20)  HR: 78 (28 Jul 2022 05:27) (78 - 91)  BP: 124/65 (28 Jul 2022 05:27) (123/69 - 138/73)  RR: 14 (28 Jul 2022 05:27) (12 - 14)  SpO2: 97% (28 Jul 2022 05:27) (96% - 97%)  I&O's Summary      PHYSICAL EXAM:  General: elderly, [place]ian speaking, A+O x 1  ENT: MMM, no thrush  Neck: Supple, No JVD  Lungs: Non labored breathing,  Clear to auscultation bilaterally,   Cardio: RRR, S1/S2, , no pitting edema bilaterally  Abdomen: Soft, Nontender, Nondistended; Bowel sounds present  Extremities: No calf tenderness, moves all extremities, heels wrapped    LABS:                        12.1   7.22  )-----------( 363      ( 26 Jul 2022 06:19 )             37.0       07-28    143  |  105  |  12  ----------------------------<  87  4.0   |  29  |  0.71    Ca    7.9      28 Jul 2022 06:51    TPro  6.0  /  Alb  1.5  /  TBili  0.3  /  DBili  0.1  /  AST  28  /  ALT  18  /  AlkPhos  77  07-28       PT/INR - ( 28 Jul 2022 06:51 )   PT: 12.6 sec;   INR: 1.09 ratio                                            RADIOLOGY & ADDITIONAL TESTS:    Care Discussed with Consultants/Other Providers:

## 2022-07-28 NOTE — PROGRESS NOTE ADULT - ASSESSMENT
88 male with PMH dementia, pancreatic insufficiency, urinary incontinence, BPH, frequent falls (left hip fracture s/p ORIF Feb 2021), upper GIB/duodenal ulcers (dx'ed Feb 2021) presents from home with poor oral intake, weakness and B/L heel ulcers; found to be Covid 19 positive.     #COVID 19  - CXR negative, currently does not have any respiratory symptoms  - Tolerating room air, O2 sat 96%  - stopped Remdesivir and was not on decadron as pt not hypoxic  - Noted D-Dimer elevated - continue lovenox once daily due to hx GI bleed      #Bilateral Heel wounds  #Right heel, stage 3 pressure ulcer  #Left heel, DTI  #Right hip DTI  - s/p limited course of antibx  - bilateral foot x-ray: no osteomyelitis, +osteoarthritis  - Wound care consult noted; cont local wound care  - Podiatry consult noted; no debridement recommended- cont local wound care   - nutritional support, cradle booties    #Failure to Thrive/weakness  #Dementia; severe   - Pt with poor po intake and profound weakness  - Continue home donepezil, sertraline   - SLP eval - recommended puree diet  - Encourage PO intake  - Palliative met with family at bedside (Son and daughter) --pt is a DNR/DNI now, no feeding tube    #Hypernatremia; resolved    #Hypokalemia; resolved  - s/p repletion    #BPH  - Chronic condition  - Continue Flomax    #Pancreatic Insufficiency  - Chronic condition  - Continue home creon, prednisone  - As per wife pt on prednisone since 2011    #DVT ppx: lovenox    GOC:  DNR/DNI/ no feeding time . Palliative following    Dispo: PT recommending [facility] Palliative following.    7/28: Called son Jv at [phone], no answer

## 2022-07-29 PROCEDURE — 99232 SBSQ HOSP IP/OBS MODERATE 35: CPT

## 2022-07-29 RX ADMIN — Medication 2.5 MILLIGRAM(S): at 06:35

## 2022-07-29 RX ADMIN — Medication 1 APPLICATION(S): at 11:48

## 2022-07-29 RX ADMIN — Medication 6 CAPSULE(S): at 17:46

## 2022-07-29 RX ADMIN — Medication 1 APPLICATION(S): at 11:49

## 2022-07-29 RX ADMIN — DONEPEZIL HYDROCHLORIDE 5 MILLIGRAM(S): 10 TABLET, FILM COATED ORAL at 21:00

## 2022-07-29 RX ADMIN — ENOXAPARIN SODIUM 40 MILLIGRAM(S): 100 INJECTION SUBCUTANEOUS at 21:02

## 2022-07-29 RX ADMIN — Medication 6 CAPSULE(S): at 08:07

## 2022-07-29 RX ADMIN — Medication 6 CAPSULE(S): at 11:48

## 2022-07-29 RX ADMIN — SENNA PLUS 2 TABLET(S): 8.6 TABLET ORAL at 21:01

## 2022-07-29 RX ADMIN — SERTRALINE 12.5 MILLIGRAM(S): 25 TABLET, FILM COATED ORAL at 11:49

## 2022-07-29 RX ADMIN — Medication 500 MILLIGRAM(S): at 11:49

## 2022-07-29 RX ADMIN — Medication 3 MILLIGRAM(S): at 23:57

## 2022-07-29 RX ADMIN — PANTOPRAZOLE SODIUM 40 MILLIGRAM(S): 20 TABLET, DELAYED RELEASE ORAL at 06:35

## 2022-07-29 RX ADMIN — Medication 1 MILLIGRAM(S): at 21:00

## 2022-07-29 NOTE — PROGRESS NOTE ADULT - NS ATTEND AMEND GEN_ALL_CORE FT
89 y/o M with PMH advanced dementia, pancreatic insufficiency, urinary incontinence, BPH, frequent falls (left hip fracture s/p ORIF Feb 2021), upper GIB secondary to duodenal ulcers (Feb 2021) presents from home with decreased oral intake and B/L heel wounds; found to be Covid 19 positive. No evidence of heel OM. completed short course of abx. Cont wound care, cont  santyl. Poor PO intake/FTT: no feeding tube, no heroic measures. PT faribaal recommended [facility].

## 2022-07-29 NOTE — PROGRESS NOTE ADULT - ASSESSMENT
88 male with PMH dementia, pancreatic insufficiency, urinary incontinence, BPH, frequent falls (left hip fracture s/p ORIF Feb 2021), upper GIB/duodenal ulcers (dx'ed Feb 2021) presents from home with poor oral intake, weakness and B/L heel ulcers; found to be Covid 19 positive.     #COVID 19  - CXR negative, currently does not have any respiratory symptoms  - Tolerating room air, O2 sat 96%  - stopped Remdesivir and was not on decadron as pt not hypoxic  - Noted D-Dimer elevated - continue lovenox once daily due to hx GI bleed      #Bilateral Heel wounds  #Right heel, stage 3 pressure ulcer  #Left heel, DTI  #Right hip DTI  - s/p limited course of antibx  - bilateral foot x-ray: no osteomyelitis, +osteoarthritis  - Wound care consult noted; cont local wound care  - Podiatry consult noted; no debridement recommended- cont local wound care   - nutritional support, cradle booties    #Failure to Thrive/weakness  #Dementia; severe   - Pt with poor po intake and profound weakness  - Continue home donepezil, sertraline   - SLP eval - recommended puree diet  - Encourage PO intake  - Palliative met with family at bedside (Son and daughter) --pt is a DNR/DNI now, no feeding tube    #Hypernatremia; resolved    #Hypokalemia; resolved  - s/p repletion    #BPH  - Chronic condition  - Continue Flomax    #Pancreatic Insufficiency  - Chronic condition  - Continue home creon, prednisone  - As per wife pt on prednisone since 2011    #DVT ppx: lovenox    GOC:  DNR/DNI/ no feeding time . Palliative following    Dispo: PT recommending [facility] following.    7/29: Called son Jv at [phone], no answer    88 male with PMH dementia, pancreatic insufficiency, urinary incontinence, BPH, frequent falls (left hip fracture s/p ORIF Feb 2021), upper GIB/duodenal ulcers (dx'ed Feb 2021) presents from home with poor oral intake, weakness and B/L heel ulcers; found to be Covid 19 positive.     #COVID 19  - CXR negative, currently does not have any respiratory symptoms  - Tolerating room air, O2 sat 96%  - stopped Remdesivir and was not on decadron as pt not hypoxic  - Noted D-Dimer elevated - continue lovenox once daily due to hx GI bleed    - quartine ends 7/30     #Bilateral Heel wounds  #Right heel, stage 3 pressure ulcer  #Left heel, DTI  #Right hip DTI  - s/p limited course of antibx  - bilateral foot x-ray: no osteomyelitis, +osteoarthritis  - Wound care consult noted; cont local wound care  - Podiatry consult noted; no debridement recommended- cont local wound care   - nutritional support, cradle booties    #Failure to Thrive/weakness  #Dementia; severe   - Pt with poor po intake and profound weakness  - Continue home donepezil, sertraline   - SLP eval - recommended puree diet with thicken liquids   - Encourage PO intake  - Palliative met with family at bedside (Son and daughter) --pt is a DNR/DNI now, no feeding tube      #BPH  - Chronic condition  - Continue Flomax    #Pancreatic Insufficiency  - Chronic condition  - Continue home creon, prednisone  - As per wife pt on prednisone since 2011    #DVT ppx: lovenox    GOC:  DNR/DNI/ no feeding time . Palliative following    Dispo: PT recommending.  [facility] following.    7/29: Called son Jv at [phone] updated him on plan of care

## 2022-07-29 NOTE — PROGRESS NOTE ADULT - SUBJECTIVE AND OBJECTIVE BOX
Patient is a 88y old  Male who presents with a chief complaint of ftt (28 Jul 2022 11:38)      Patient seen and examined at bedside.    ALLERGIES:  No Known Allergies    MEDICATIONS  (STANDING):  ascorbic acid 500 milliGRAM(s) Oral daily  collagenase Ointment 1 Application(s) Topical daily  collagenase Ointment 1 Application(s) Topical daily  dextrose 5%. 1000 milliLiter(s) (75 mL/Hr) IV Continuous <Continuous>  donepezil 5 milliGRAM(s) Oral at bedtime  doxazosin 1 milliGRAM(s) Oral at bedtime  enoxaparin Injectable 40 milliGRAM(s) SubCutaneous every 24 hours  pancrelipase  (CREON 12,000 Lipase Units) 6 Capsule(s) Oral three times a day with meals  pantoprazole    Tablet 40 milliGRAM(s) Oral before breakfast  predniSONE   Tablet 2.5 milliGRAM(s) Oral daily  senna 2 Tablet(s) Oral at bedtime  sertraline 12.5 milliGRAM(s) Oral daily    MEDICATIONS  (PRN):  acetaminophen     Tablet .. 650 milliGRAM(s) Oral every 6 hours PRN Temp greater or equal to 38C (100.4F), Mild Pain (1 - 3)  aluminum hydroxide/magnesium hydroxide/simethicone Suspension 30 milliLiter(s) Oral every 4 hours PRN Dyspepsia  melatonin 3 milliGRAM(s) Oral at bedtime PRN Insomnia  ondansetron Injectable 4 milliGRAM(s) IV Push every 8 hours PRN Nausea and/or Vomiting    Vital Signs Last 24 Hrs  T(F): 97.3 (29 Jul 2022 05:55), Max: 98.5 (28 Jul 2022 22:57)  HR: 85 (29 Jul 2022 05:55) (72 - 85)  BP: 157/93 (29 Jul 2022 05:55) (129/71 - 157/93)  RR: 20 (29 Jul 2022 05:55) (13 - 20)  SpO2: 97% (29 Jul 2022 05:55) (97% - 98%)  I&O's Summary    PHYSICAL EXAM:  General: NAD, A/O x 3  ENT: MMM  Neck: Supple, No JVD  Lungs: Clear to auscultation bilaterally, Non labored breathing   Cardio: RRR, S1/S2, No murmurs  Abdomen: Soft, Nontender, Nondistended; Bowel sounds present  Extremities: No calf tenderness, No pitting edema    LABS:    07-28    143  |  105  |  12  ----------------------------<  87  4.0   |  29  |  0.71    Ca    7.9      28 Jul 2022 06:51    TPro  6.0  /  Alb  1.5  /  TBili  0.3  /  DBili  0.1  /  AST  28  /  ALT  18  /  AlkPhos  77  07-28      PT/INR - ( 28 Jul 2022 06:51 )   PT: 12.6 sec;   INR: 1.09 ratio                                         RADIOLOGY & ADDITIONAL TESTS:    Care Discussed with Consultants/Other Providers:    Patient is a 88y old  Male who presents with a chief complaint of ftt (28 Jul 2022 11:38)      Patient seen and examined at bedside.  Pt without complaints this am .  Pt was able to eat breakfast this am .  No events overnight     ALLERGIES:  No Known Allergies    MEDICATIONS  (STANDING):  ascorbic acid 500 milliGRAM(s) Oral daily  collagenase Ointment 1 Application(s) Topical daily  collagenase Ointment 1 Application(s) Topical daily  dextrose 5%. 1000 milliLiter(s) (75 mL/Hr) IV Continuous <Continuous>  donepezil 5 milliGRAM(s) Oral at bedtime  doxazosin 1 milliGRAM(s) Oral at bedtime  enoxaparin Injectable 40 milliGRAM(s) SubCutaneous every 24 hours  pancrelipase  (CREON 12,000 Lipase Units) 6 Capsule(s) Oral three times a day with meals  pantoprazole    Tablet 40 milliGRAM(s) Oral before breakfast  predniSONE   Tablet 2.5 milliGRAM(s) Oral daily  senna 2 Tablet(s) Oral at bedtime  sertraline 12.5 milliGRAM(s) Oral daily    MEDICATIONS  (PRN):  acetaminophen     Tablet .. 650 milliGRAM(s) Oral every 6 hours PRN Temp greater or equal to 38C (100.4F), Mild Pain (1 - 3)  aluminum hydroxide/magnesium hydroxide/simethicone Suspension 30 milliLiter(s) Oral every 4 hours PRN Dyspepsia  melatonin 3 milliGRAM(s) Oral at bedtime PRN Insomnia  ondansetron Injectable 4 milliGRAM(s) IV Push every 8 hours PRN Nausea and/or Vomiting    Vital Signs Last 24 Hrs  T(F): 97.3 (29 Jul 2022 05:55), Max: 98.5 (28 Jul 2022 22:57)  HR: 85 (29 Jul 2022 05:55) (72 - 85)  BP: 157/93 (29 Jul 2022 05:55) (129/71 - 157/93)  RR: 20 (29 Jul 2022 05:55) (13 - 20)  SpO2: 97% (29 Jul 2022 05:55) (97% - 98%)  I&O's Summary    PHYSICAL EXAM:  General: 89 y/o male in NAD, A/O x 1 with dementia   ENT: MMM  Neck: Supple, No JVD  Lungs: Clear to auscultation bilaterally, Non labored breathing   Cardio: RRR, S1/S2, No murmurs  Abdomen: Soft, Nontender, Nondistended; Bowel sounds present  Extremities: No calf tenderness, No pitting edema    LABS:    07-28    143  |  105  |  12  ----------------------------<  87  4.0   |  29  |  0.71    Ca    7.9      28 Jul 2022 06:51    TPro  6.0  /  Alb  1.5  /  TBili  0.3  /  DBili  0.1  /  AST  28  /  ALT  18  /  AlkPhos  77  07-28      PT/INR - ( 28 Jul 2022 06:51 )   PT: 12.6 sec;   INR: 1.09 ratio                                         RADIOLOGY & ADDITIONAL TESTS:    Care Discussed with Consultants/Other Providers:

## 2022-07-29 NOTE — CHART NOTE - NSCHARTNOTEFT_GEN_A_CORE
Nutrition Follow Up Note  Hospital Course   (Per Electronic Medical Record)    Source:  Patient [X]  Nursing Staff [X]   Medical Record [X]      Diet: Diet, Pureed:   Mildly Thick Liquids (MILDTHICKLIQS) No Straws  Single Sips Only (07-20-22 @ 10:29) [Active]            Nutrition Follow Up: As per IDT rounds, patient requires total assistance with feeding.  Pt's PO intake varies, patient consumes ~ % of meals for family however with staff patient consumes minimal. Patient noted to have good PO intake this morning at breakfast (as per nurse).  As per RN family requesting Hospice services. palliative note reviewed, pt noted to have a DNR/DNI and family not receptive to EN feeds. Patient noted to have moderate malnutrition, patient is currently receiving Magic Cup BID (Provides 580kcal-18grams of Protein) to optimize pt  PO intake and nutritional status. Multiple wounds noted, recommend  Zinc, Vit. C, & MVI supplement to aid in wound healing.        START ADQARYSC22-10    143  |  105  |  12  ----------------------------<  87  4.0   |  29  |  0.71    Ca    7.9<L>      28 Jul 2022 06:51    TPro  6.0  /  Alb  1.5<L>  /  TBili  0.3  /  DBili  0.1  /  AST  28  /  ALT  18  /  AlkPhos  77  07-28  END CHEMFISH  START MEDSACTIVEMEDICATIONS  (STANDING):  ascorbic acid 500 milliGRAM(s) Oral daily  collagenase Ointment 1 Application(s) Topical daily  collagenase Ointment 1 Application(s) Topical daily  dextrose 5%. 1000 milliLiter(s) (75 mL/Hr) IV Continuous <Continuous>  donepezil 5 milliGRAM(s) Oral at bedtime  doxazosin 1 milliGRAM(s) Oral at bedtime  enoxaparin Injectable 40 milliGRAM(s) SubCutaneous every 24 hours  pancrelipase  (CREON 12,000 Lipase Units) 6 Capsule(s) Oral three times a day with meals  pantoprazole    Tablet 40 milliGRAM(s) Oral before breakfast  predniSONE   Tablet 2.5 milliGRAM(s) Oral daily  senna 2 Tablet(s) Oral at bedtime  sertraline 12.5 milliGRAM(s) Oral daily    MEDICATIONS  (PRN):  acetaminophen     Tablet .. 650 milliGRAM(s) Oral every 6 hours PRN Temp greater or equal to 38C (100.4F), Mild Pain (1 - 3)  aluminum hydroxide/magnesium hydroxide/simethicone Suspension 30 milliLiter(s) Oral every 4 hours PRN Dyspepsia  melatonin 3 milliGRAM(s) Oral at bedtime PRN Insomnia  ondansetron Injectable 4 milliGRAM(s) IV Push every 8 hours PRN Nausea and/or Vomiting  END MEDSACTIVE  START DIETORDEREND DIETORDER  START ADMITDXWeakness    END ADMITDX  START IOFS  END IOFS  START SKINPUEND SKINPU        Pertinent Medications: MEDICATIONS  (STANDING):  ascorbic acid 500 milliGRAM(s) Oral daily  collagenase Ointment 1 Application(s) Topical daily  collagenase Ointment 1 Application(s) Topical daily  dextrose 5%. 1000 milliLiter(s) (75 mL/Hr) IV Continuous <Continuous>  donepezil 5 milliGRAM(s) Oral at bedtime  doxazosin 1 milliGRAM(s) Oral at bedtime  enoxaparin Injectable 40 milliGRAM(s) SubCutaneous every 24 hours  pancrelipase  (CREON 12,000 Lipase Units) 6 Capsule(s) Oral three times a day with meals  pantoprazole    Tablet 40 milliGRAM(s) Oral before breakfast  predniSONE   Tablet 2.5 milliGRAM(s) Oral daily  senna 2 Tablet(s) Oral at bedtime  sertraline 12.5 milliGRAM(s) Oral daily    MEDICATIONS  (PRN):  acetaminophen     Tablet .. 650 milliGRAM(s) Oral every 6 hours PRN Temp greater or equal to 38C (100.4F), Mild Pain (1 - 3)  aluminum hydroxide/magnesium hydroxide/simethicone Suspension 30 milliLiter(s) Oral every 4 hours PRN Dyspepsia  melatonin 3 milliGRAM(s) Oral at bedtime PRN Insomnia  ondansetron Injectable 4 milliGRAM(s) IV Push every 8 hours PRN Nausea and/or Vomiting      Pertinent Labs:  07-28 Na143 mmol/L Glu 87 mg/dL K+ 4.0 mmol/L Cr  0.71 mg/dL BUN 12 mg/dL 07-28 Alb 1.5 g/dL<L>            Enteral/Parenteral Nutrition: Not Applicable    Current Weight (lbs): 164 lb on 7/19  Weight Trends (lbs):  no weight trends since admission weight, recommend obtaining daily weights for weight trends      Skin: Pressure injury noted at right heel/stage 3, multiple wounds noted at left heel DTI, right hip DTI, left heel lateral.    Edema: none noted    Last Bowel Movement: on 7/26         Estimated Needs:   [X] No Change Since Previous Assessment    Previous Nutrition Diagnosis:   Moderate Malnutrition    Nutrition Diagnosis is [X] Ongoing -         Interventions:   1. Recommend continue current nutrition plan of care as tolerated  2. Recommend continue Magic Cup BID (Provides 580kcal-18grams of Protein) to optimize PO intake and nutritional status  3. Recommend MVI, ascorbic acid, & zinc supplement to aid in wound healing  4. Obtain daily weights for weight trend    Monitoring & Evaluation:   [X] Weights   [X] PO Intake   [X] Skin Integrity   [X] Follow Up (Per Protocol)  [X] Tolerance to Diet Prescription   [X] Other: Labs & POCT    Registered Dietitian/Nutritionist Remains Available.  Jonathan Ramirez RD, CDN    Phone# [phone]

## 2022-07-30 PROCEDURE — 99232 SBSQ HOSP IP/OBS MODERATE 35: CPT

## 2022-07-30 RX ADMIN — SENNA PLUS 2 TABLET(S): 8.6 TABLET ORAL at 21:47

## 2022-07-30 RX ADMIN — ENOXAPARIN SODIUM 40 MILLIGRAM(S): 100 INJECTION SUBCUTANEOUS at 21:49

## 2022-07-30 RX ADMIN — Medication 1 APPLICATION(S): at 11:46

## 2022-07-30 RX ADMIN — PANTOPRAZOLE SODIUM 40 MILLIGRAM(S): 20 TABLET, DELAYED RELEASE ORAL at 05:54

## 2022-07-30 RX ADMIN — Medication 2.5 MILLIGRAM(S): at 05:54

## 2022-07-30 RX ADMIN — DONEPEZIL HYDROCHLORIDE 5 MILLIGRAM(S): 10 TABLET, FILM COATED ORAL at 21:48

## 2022-07-30 RX ADMIN — Medication 1 MILLIGRAM(S): at 21:48

## 2022-07-30 RX ADMIN — Medication 6 CAPSULE(S): at 13:21

## 2022-07-30 RX ADMIN — SERTRALINE 12.5 MILLIGRAM(S): 25 TABLET, FILM COATED ORAL at 13:22

## 2022-07-30 RX ADMIN — Medication 6 CAPSULE(S): at 17:53

## 2022-07-30 RX ADMIN — Medication 500 MILLIGRAM(S): at 13:21

## 2022-07-30 NOTE — PROGRESS NOTE ADULT - NS ATTEND AMEND GEN_ALL_CORE FT
87 y/o M with PMH advanced dementia, pancreatic insufficiency, urinary incontinence, BPH, frequent falls (left hip fracture s/p ORIF Feb 2021), upper GIB secondary to duodenal ulcers (Feb 2021) presents from home with decreased oral intake and B/L heel wounds; found to be Covid 19 positive. No evidence of heel OM. completed short course of abx. Cont wound care, cont santyl. Poor PO intake/FTT: no feeding tube, no heroic measures. Dispo pending insurance verification per family. CM/SW appreciated. continue PT as tolerated

## 2022-07-30 NOTE — PROGRESS NOTE ADULT - SUBJECTIVE AND OBJECTIVE BOX
Patient is a 88y old  Male who presents with a chief complaint of weakness (29 Jul 2022 07:33)      Patient seen and examined at bedside.    ALLERGIES:  No Known Allergies    MEDICATIONS  (STANDING):  ascorbic acid 500 milliGRAM(s) Oral daily  collagenase Ointment 1 Application(s) Topical daily  collagenase Ointment 1 Application(s) Topical daily  dextrose 5%. 1000 milliLiter(s) (75 mL/Hr) IV Continuous <Continuous>  donepezil 5 milliGRAM(s) Oral at bedtime  doxazosin 1 milliGRAM(s) Oral at bedtime  enoxaparin Injectable 40 milliGRAM(s) SubCutaneous every 24 hours  pancrelipase  (CREON 12,000 Lipase Units) 6 Capsule(s) Oral three times a day with meals  pantoprazole    Tablet 40 milliGRAM(s) Oral before breakfast  predniSONE   Tablet 2.5 milliGRAM(s) Oral daily  senna 2 Tablet(s) Oral at bedtime  sertraline 12.5 milliGRAM(s) Oral daily    MEDICATIONS  (PRN):  acetaminophen     Tablet .. 650 milliGRAM(s) Oral every 6 hours PRN Temp greater or equal to 38C (100.4F), Mild Pain (1 - 3)  aluminum hydroxide/magnesium hydroxide/simethicone Suspension 30 milliLiter(s) Oral every 4 hours PRN Dyspepsia  melatonin 3 milliGRAM(s) Oral at bedtime PRN Insomnia  ondansetron Injectable 4 milliGRAM(s) IV Push every 8 hours PRN Nausea and/or Vomiting    Vital Signs Last 24 Hrs  T(F): 98.1 (30 Jul 2022 05:26), Max: 98.1 (29 Jul 2022 20:09)  HR: 67 (30 Jul 2022 05:26) (67 - 88)  BP: 125/80 (30 Jul 2022 05:26) (118/64 - 146/76)  RR: 16 (30 Jul 2022 05:26) (13 - 16)  SpO2: 98% (30 Jul 2022 05:26) (96% - 98%)  I&O's Summary    29 Jul 2022 07:01  -  30 Jul 2022 07:00  --------------------------------------------------------  IN: 120 mL / OUT: 0 mL / NET: 120 mL      PHYSICAL EXAM:  General: NAD, A/O x 3  ENT: MMM  Neck: Supple, No JVD  Lungs: Clear to auscultation bilaterally, Non labored breathing   Cardio: RRR, S1/S2, No murmurs  Abdomen: Soft, Nontender, Nondistended; Bowel sounds present  Extremities: No calf tenderness, No pitting edema    LABS:    07-28    143  |  105  |  12  ----------------------------<  87  4.0   |  29  |  0.71    Ca    7.9      28 Jul 2022 06:51    TPro  6.0  /  Alb  1.5  /  TBili  0.3  /  DBili  0.1  /  AST  28  /  ALT  18  /  AlkPhos  77  07-28      PT/INR - ( 28 Jul 2022 06:51 )   PT: 12.6 sec;   INR: 1.09 ratio                                         RADIOLOGY & ADDITIONAL TESTS:    Care Discussed with Consultants/Other Providers:    Patient is a 88y old  Male who presents with a chief complaint of weakness (29 Jul 2022 07:33)      Patient seen and examined at bedside. Pt awake denies complaints.  No events overnight     ALLERGIES:  No Known Allergies    MEDICATIONS  (STANDING):  ascorbic acid 500 milliGRAM(s) Oral daily  collagenase Ointment 1 Application(s) Topical daily  collagenase Ointment 1 Application(s) Topical daily  dextrose 5%. 1000 milliLiter(s) (75 mL/Hr) IV Continuous <Continuous>  donepezil 5 milliGRAM(s) Oral at bedtime  doxazosin 1 milliGRAM(s) Oral at bedtime  enoxaparin Injectable 40 milliGRAM(s) SubCutaneous every 24 hours  pancrelipase  (CREON 12,000 Lipase Units) 6 Capsule(s) Oral three times a day with meals  pantoprazole    Tablet 40 milliGRAM(s) Oral before breakfast  predniSONE   Tablet 2.5 milliGRAM(s) Oral daily  senna 2 Tablet(s) Oral at bedtime  sertraline 12.5 milliGRAM(s) Oral daily    MEDICATIONS  (PRN):  acetaminophen     Tablet .. 650 milliGRAM(s) Oral every 6 hours PRN Temp greater or equal to 38C (100.4F), Mild Pain (1 - 3)  aluminum hydroxide/magnesium hydroxide/simethicone Suspension 30 milliLiter(s) Oral every 4 hours PRN Dyspepsia  melatonin 3 milliGRAM(s) Oral at bedtime PRN Insomnia  ondansetron Injectable 4 milliGRAM(s) IV Push every 8 hours PRN Nausea and/or Vomiting    Vital Signs Last 24 Hrs  T(F): 98.1 (30 Jul 2022 05:26), Max: 98.1 (29 Jul 2022 20:09)  HR: 67 (30 Jul 2022 05:26) (67 - 88)  BP: 125/80 (30 Jul 2022 05:26) (118/64 - 146/76)  RR: 16 (30 Jul 2022 05:26) (13 - 16)  SpO2: 98% (30 Jul 2022 05:26) (96% - 98%)  I&O's Summary    29 Jul 2022 07:01  -  30 Jul 2022 07:00  --------------------------------------------------------  IN: 120 mL / OUT: 0 mL / NET: 120 mL      PHYSICAL EXAM:  General: 88 y/o male in NAD, A/O x 1   ENT: MMM  Neck: Supple, No JVD  Lungs: Clear to auscultation bilaterally, Non labored breathing   Cardio: RRR, S1/S2, No murmurs  Abdomen: Soft, Nontender, Nondistended; Bowel sounds present  Extremities: No calf tenderness, No pitting edema    LABS:    07-28    143  |  105  |  12  ----------------------------<  87  4.0   |  29  |  0.71    Ca    7.9      28 Jul 2022 06:51    TPro  6.0  /  Alb  1.5  /  TBili  0.3  /  DBili  0.1  /  AST  28  /  ALT  18  /  AlkPhos  77  07-28      PT/INR - ( 28 Jul 2022 06:51 )   PT: 12.6 sec;   INR: 1.09 ratio                                         RADIOLOGY & ADDITIONAL TESTS:    Care Discussed with Consultants/Other Providers:    Patient is a 88y old  Male who presents with a chief complaint of weakness (29 Jul 2022 07:33)      Patient seen and examined at bedside. Pt awake more lethargic today .  No events overnight     ALLERGIES:  No Known Allergies    MEDICATIONS  (STANDING):  ascorbic acid 500 milliGRAM(s) Oral daily  collagenase Ointment 1 Application(s) Topical daily  collagenase Ointment 1 Application(s) Topical daily  dextrose 5%. 1000 milliLiter(s) (75 mL/Hr) IV Continuous <Continuous>  donepezil 5 milliGRAM(s) Oral at bedtime  doxazosin 1 milliGRAM(s) Oral at bedtime  enoxaparin Injectable 40 milliGRAM(s) SubCutaneous every 24 hours  pancrelipase  (CREON 12,000 Lipase Units) 6 Capsule(s) Oral three times a day with meals  pantoprazole    Tablet 40 milliGRAM(s) Oral before breakfast  predniSONE   Tablet 2.5 milliGRAM(s) Oral daily  senna 2 Tablet(s) Oral at bedtime  sertraline 12.5 milliGRAM(s) Oral daily    MEDICATIONS  (PRN):  acetaminophen     Tablet .. 650 milliGRAM(s) Oral every 6 hours PRN Temp greater or equal to 38C (100.4F), Mild Pain (1 - 3)  aluminum hydroxide/magnesium hydroxide/simethicone Suspension 30 milliLiter(s) Oral every 4 hours PRN Dyspepsia  melatonin 3 milliGRAM(s) Oral at bedtime PRN Insomnia  ondansetron Injectable 4 milliGRAM(s) IV Push every 8 hours PRN Nausea and/or Vomiting    Vital Signs Last 24 Hrs  T(F): 98.1 (30 Jul 2022 05:26), Max: 98.1 (29 Jul 2022 20:09)  HR: 67 (30 Jul 2022 05:26) (67 - 88)  BP: 125/80 (30 Jul 2022 05:26) (118/64 - 146/76)  RR: 16 (30 Jul 2022 05:26) (13 - 16)  SpO2: 98% (30 Jul 2022 05:26) (96% - 98%)  I&O's Summary    29 Jul 2022 07:01  -  30 Jul 2022 07:00  --------------------------------------------------------  IN: 120 mL / OUT: 0 mL / NET: 120 mL      PHYSICAL EXAM:  General: 89 y/o male in NAD, Awake non verbal  ENT: MMM  Neck: Supple, No JVD  Lungs: Clear to auscultation bilaterally, Non labored breathing   Cardio: RRR, S1/S2, No murmurs  Abdomen: Soft, Nontender, Nondistended; Bowel sounds present  Extremities: No calf tenderness, No pitting edema    LABS:    07-28    143  |  105  |  12  ----------------------------<  87  4.0   |  29  |  0.71    Ca    7.9      28 Jul 2022 06:51    TPro  6.0  /  Alb  1.5  /  TBili  0.3  /  DBili  0.1  /  AST  28  /  ALT  18  /  AlkPhos  77  07-28      PT/INR - ( 28 Jul 2022 06:51 )   PT: 12.6 sec;   INR: 1.09 ratio                                         RADIOLOGY & ADDITIONAL TESTS:    Care Discussed with Consultants/Other Providers:

## 2022-07-30 NOTE — PROGRESS NOTE ADULT - ASSESSMENT
88 male with PMH dementia, pancreatic insufficiency, urinary incontinence, BPH, frequent falls (left hip fracture s/p ORIF Feb 2021), upper GIB/duodenal ulcers (dx'ed Feb 2021) presents from home with poor oral intake, weakness and B/L heel ulcers; found to be Covid 19 positive.     #COVID 19- day 11 off isolation   - CXR negative, currently does not have any respiratory symptoms  - Tolerating room air, O2 sat 96%  - stopped Remdesivir and was not on decadron as pt not hypoxic  - Noted D-Dimer elevated - continue lovenox once daily due to hx GI bleed    - day 11 off isolation as of today     #Bilateral Heel wounds  #Right heel, stage 3 pressure ulcer  #Left heel, DTI  #Right hip DTI  - s/p limited course of antibx  - bilateral foot x-ray: no osteomyelitis, +osteoarthritis  - Wound care consult noted; cont local wound care  - Podiatry consult noted; no debridement recommended- cont local wound care   - nutritional support, cradle booties    #Failure to Thrive/weakness  #Dementia; severe   - Pt with poor po intake and profound weakness  - Continue home donepezil, sertraline   - SLP eval - recommended puree diet with thicken liquids   - Encourage PO intake  - Palliative met with family at bedside (Son and daughter) --pt is a DNR/DNI now, no feeding tube      #BPH  - Chronic condition  - Continue Flomax    #Pancreatic Insufficiency  - Chronic condition  - Continue home creon, prednisone  - As per wife pt on prednisone since 2011    #DVT ppx: lovenox    GOC:  DNR/DNI/ no feeding time . Palliative following    Dispo: PT recommending.  [facility] following.    7/29: Called son Jv at [phone] updated him on plan of care    88 male with PMH dementia, pancreatic insufficiency, urinary incontinence, BPH, frequent falls (left hip fracture s/p ORIF Feb 2021), upper GIB/duodenal ulcers (dx'ed Feb 2021) presents from home with poor oral intake, weakness and B/L heel ulcers; found to be Covid 19 positive.     #COVID 19- day 11 off isolation   - CXR negative, currently does not have any respiratory symptoms  - Tolerating room air, O2 sat 96%  - stopped Remdesivir after 2 days  and was not on decadron as pt not hypoxic  - Noted D-Dimer elevated - continue lovenox once daily due to hx GI bleed    - day 11 off isolation as of today     #Bilateral Heel wounds  #Right heel, stage 3 pressure ulcer  #Left heel, DTI  #Right hip DTI  - s/p limited course of antibx  - bilateral foot x-ray: no osteomyelitis, +osteoarthritis  - Wound care consult noted; cont local wound care  - Podiatry consult noted; no debridement recommended- cont local wound care   - nutritional support, cradle booties    #Failure to Thrive/weakness  #Dementia; severe   - Pt with poor po intake and profound weakness  - Continue home donepezil, sertraline   - SLP eval - recommended puree diet with thicken liquids   - Encourage PO intake  - Palliative met with family at bedside (Son and daughter) --pt is a DNR/DNI now, no feeding tube    #BPH  - Chronic condition  - Continue Flomax    #Pancreatic Insufficiency  - Chronic condition  - Continue home creon, prednisone  - As per wife pt on prednisone since 2011    #DVT ppx: lovenox    GOC:  DNR/DNI/ no feeding time . Palliative following  awaiting [facility] placement    Dispo: PT recommending.  [facility] Palliative following.    7/29: Called son Jv at [phone] updated him on plan of care    88 male with PMH dementia, pancreatic insufficiency, urinary incontinence, BPH, frequent falls (left hip fracture s/p ORIF Feb 2021), upper GIB/duodenal ulcers (dx'ed Feb 2021) presents from home with poor oral intake, weakness and B/L heel ulcers; found to be Covid 19 positive.     #COVID 19- day 11 off isolation   - CXR negative, currently does not have any respiratory symptoms  - Tolerating room air, O2 sat 96%  - stopped Remdesivir after 2 days  and was not on decadron as pt not hypoxic  - Noted D-Dimer elevated - continue lovenox once daily due to hx GI bleed    - day 11 off isolation as of today     #Bilateral Heel wounds  #Right heel, stage 3 pressure ulcer  #Left heel, DTI  #Right hip DTI  - s/p limited course of antibx  - bilateral foot x-ray: no osteomyelitis, +osteoarthritis  - Wound care consult noted; cont local wound care  - Podiatry consult noted; no debridement recommended- cont local wound care   - nutritional support, cradle booties    #Failure to Thrive/weakness  #Dementia; severe   - Pt with poor po intake and profound weakness  - Continue home donepezil, sertraline   - SLP eval - recommended puree diet with thicken liquids   - Encourage PO intake  - Palliative met with family at bedside (Son and daughter) --pt is a DNR/DNI now, no feeding tube    #BPH  - Chronic condition  - Continue Flomax    #Pancreatic Insufficiency  - Chronic condition  - Continue home creon, prednisone  - As per wife pt on prednisone since 2011    #DVT ppx: lovenox    GOC:  DNR/DNI/ no feeding time . Palliative following  awaiting [facility] placement    Dispo: PT recommending.  [facility] following.    7/30: Called son Jv at [phone] left voice message with call back number

## 2022-07-31 PROCEDURE — 99232 SBSQ HOSP IP/OBS MODERATE 35: CPT

## 2022-07-31 RX ORDER — PANTOPRAZOLE SODIUM 20 MG/1
40 TABLET, DELAYED RELEASE ORAL DAILY
Refills: 0 | Status: DISCONTINUED | OUTPATIENT
Start: 2022-08-01 | End: 2022-08-02

## 2022-07-31 RX ADMIN — Medication 6 CAPSULE(S): at 08:02

## 2022-07-31 RX ADMIN — Medication 2.5 MILLIGRAM(S): at 05:19

## 2022-07-31 RX ADMIN — Medication 1 APPLICATION(S): at 12:57

## 2022-07-31 RX ADMIN — Medication 6 CAPSULE(S): at 12:56

## 2022-07-31 RX ADMIN — PANTOPRAZOLE SODIUM 40 MILLIGRAM(S): 20 TABLET, DELAYED RELEASE ORAL at 08:02

## 2022-07-31 RX ADMIN — Medication 1 APPLICATION(S): at 12:56

## 2022-07-31 RX ADMIN — ENOXAPARIN SODIUM 40 MILLIGRAM(S): 100 INJECTION SUBCUTANEOUS at 22:08

## 2022-07-31 RX ADMIN — Medication 500 MILLIGRAM(S): at 12:57

## 2022-07-31 RX ADMIN — SERTRALINE 12.5 MILLIGRAM(S): 25 TABLET, FILM COATED ORAL at 12:57

## 2022-07-31 RX ADMIN — Medication 6 CAPSULE(S): at 17:36

## 2022-07-31 NOTE — PROGRESS NOTE ADULT - SUBJECTIVE AND OBJECTIVE BOX
Patient is a 88y old  Male who presents with a chief complaint of failure to thrive (30 Jul 2022 07:44)      Patient seen and examined at bedside.    ALLERGIES:  No Known Allergies    MEDICATIONS  (STANDING):  ascorbic acid 500 milliGRAM(s) Oral daily  collagenase Ointment 1 Application(s) Topical daily  collagenase Ointment 1 Application(s) Topical daily  dextrose 5%. 1000 milliLiter(s) (75 mL/Hr) IV Continuous <Continuous>  donepezil 5 milliGRAM(s) Oral at bedtime  doxazosin 1 milliGRAM(s) Oral at bedtime  enoxaparin Injectable 40 milliGRAM(s) SubCutaneous every 24 hours  pancrelipase  (CREON 12,000 Lipase Units) 6 Capsule(s) Oral three times a day with meals  pantoprazole    Tablet 40 milliGRAM(s) Oral before breakfast  predniSONE   Tablet 2.5 milliGRAM(s) Oral daily  senna 2 Tablet(s) Oral at bedtime  sertraline 12.5 milliGRAM(s) Oral daily    MEDICATIONS  (PRN):  acetaminophen     Tablet .. 650 milliGRAM(s) Oral every 6 hours PRN Temp greater or equal to 38C (100.4F), Mild Pain (1 - 3)  aluminum hydroxide/magnesium hydroxide/simethicone Suspension 30 milliLiter(s) Oral every 4 hours PRN Dyspepsia  melatonin 3 milliGRAM(s) Oral at bedtime PRN Insomnia  ondansetron Injectable 4 milliGRAM(s) IV Push every 8 hours PRN Nausea and/or Vomiting    Vital Signs Last 24 Hrs  T(F): 98.7 (31 Jul 2022 05:24), Max: 99 (30 Jul 2022 19:40)  HR: 72 (31 Jul 2022 05:24) (72 - 84)  BP: 134/75 (31 Jul 2022 05:24) (134/75 - 156/78)  RR: 16 (31 Jul 2022 05:24) (16 - 17)  SpO2: 95% (31 Jul 2022 05:24) (95% - 98%)  I&O's Summary    30 Jul 2022 07:01  -  31 Jul 2022 07:00  --------------------------------------------------------  IN: 200 mL / OUT: 600 mL / NET: -400 mL      PHYSICAL EXAM:  General: NAD, A/O x 3  ENT: MMM  Neck: Supple, No JVD  Lungs: Clear to auscultation bilaterally, Non labored breathing   Cardio: RRR, S1/S2, No murmurs  Abdomen: Soft, Nontender, Nondistended; Bowel sounds present  Extremities: No calf tenderness, No pitting edema    LABS:                                            RADIOLOGY & ADDITIONAL TESTS:    Care Discussed with Consultants/Other Providers:    Patient is a 88y old  Male who presents with a chief complaint of failure to thrive (30 Jul 2022 07:44)      Patient seen and examined at bedside.  Pt awake smiling for me this am but non verbal due to dementia.      ALLERGIES:  No Known Allergies    MEDICATIONS  (STANDING):  ascorbic acid 500 milliGRAM(s) Oral daily  collagenase Ointment 1 Application(s) Topical daily  collagenase Ointment 1 Application(s) Topical daily  dextrose 5%. 1000 milliLiter(s) (75 mL/Hr) IV Continuous <Continuous>  donepezil 5 milliGRAM(s) Oral at bedtime  doxazosin 1 milliGRAM(s) Oral at bedtime  enoxaparin Injectable 40 milliGRAM(s) SubCutaneous every 24 hours  pancrelipase  (CREON 12,000 Lipase Units) 6 Capsule(s) Oral three times a day with meals  pantoprazole    Tablet 40 milliGRAM(s) Oral before breakfast  predniSONE   Tablet 2.5 milliGRAM(s) Oral daily  senna 2 Tablet(s) Oral at bedtime  sertraline 12.5 milliGRAM(s) Oral daily    MEDICATIONS  (PRN):  acetaminophen     Tablet .. 650 milliGRAM(s) Oral every 6 hours PRN Temp greater or equal to 38C (100.4F), Mild Pain (1 - 3)  aluminum hydroxide/magnesium hydroxide/simethicone Suspension 30 milliLiter(s) Oral every 4 hours PRN Dyspepsia  melatonin 3 milliGRAM(s) Oral at bedtime PRN Insomnia  ondansetron Injectable 4 milliGRAM(s) IV Push every 8 hours PRN Nausea and/or Vomiting    Vital Signs Last 24 Hrs  T(F): 98.7 (31 Jul 2022 05:24), Max: 99 (30 Jul 2022 19:40)  HR: 72 (31 Jul 2022 05:24) (72 - 84)  BP: 134/75 (31 Jul 2022 05:24) (134/75 - 156/78)  RR: 16 (31 Jul 2022 05:24) (16 - 17)  SpO2: 95% (31 Jul 2022 05:24) (95% - 98%)  I&O's Summary    30 Jul 2022 07:01  -  31 Jul 2022 07:00  --------------------------------------------------------  IN: 200 mL / OUT: 600 mL / NET: -400 mL      PHYSICAL EXAM:  General: 87 y/o male in NAD, Alert non verbal   ENT: MMM  Neck: Supple, No JVD  Lungs: Clear to auscultation bilaterally, Non labored breathing   Cardio: RRR, S1/S2, No murmurs  Abdomen: Soft, Nontender, Nondistended; Bowel sounds present  Extremities: No calf tenderness, No pitting edema    LABS:                                            RADIOLOGY & ADDITIONAL TESTS:    Care Discussed with Consultants/Other Providers:

## 2022-07-31 NOTE — PROGRESS NOTE ADULT - ASSESSMENT
88 male with PMH dementia, pancreatic insufficiency, urinary incontinence, BPH, frequent falls (left hip fracture s/p ORIF Feb 2021), upper GIB/duodenal ulcers (dx'ed Feb 2021) presents from home with poor oral intake, weakness and B/L heel ulcers; found to be Covid 19 positive.     #COVID 19- day 12--- off isolation   - CXR negative, currently does not have any respiratory symptoms  - Tolerating room air, O2 sat 96%  - stopped Remdesivir after 2 days  and was not on decadron as pt not hypoxic  - Noted D-Dimer elevated - continue lovenox once daily due to hx GI bleed    - day 11 off isolation as of today     #Bilateral Heel wounds  #Right heel, stage 3 pressure ulcer  #Left heel, DTI  #Right hip DTI  - s/p limited course of antibx  - bilateral foot x-ray: no osteomyelitis, +osteoarthritis  - Wound care consult noted; cont local wound care  - Podiatry consult noted; no debridement recommended- cont local wound care   - nutritional support, cradle booties    #Failure to Thrive/weakness  #Dementia; severe   - Pt with poor po intake and profound weakness  - Continue home donepezil, sertraline   - SLP eval - recommended puree diet with thicken liquids   - Encourage PO intake  - Palliative met with family at bedside (Son and daughter) --pt is a DNR/DNI now, no feeding tube    #BPH  - Chronic condition  - Continue Flomax    #Pancreatic Insufficiency  - Chronic condition  - Continue home creon, prednisone  - As per wife pt on prednisone since 2011    #DVT ppx: lovenox    GOC:  DNR/DNI/ no feeding time . Palliative following  awaiting [facility] placement    Dispo: PT recommending.  [facility] Palliative following.    7/31: Called son Jv at [phone] left voice message with call back number    88 male with PMH dementia, pancreatic insufficiency, urinary incontinence, BPH, frequent falls (left hip fracture s/p ORIF Feb 2021), upper GIB/duodenal ulcers (dx'ed Feb 2021) presents from home with poor oral intake, weakness and B/L heel ulcers; found to be Covid 19 positive.     #COVID 19- day 12--- off isolation   - CXR negative, currently does not have any respiratory symptoms  - Tolerating room air, O2 sat 96%  - received  Remdesivir  2 days  and not a candidate for  decadron as pt was not hypoxic  - Noted D-Dimer elevated - continue lovenox once daily due to hx GI bleed    - day 12 off isolation as of today     #Bilateral Heel wounds  #Right heel, stage 3 pressure ulcer  #Left heel, DTI  #Right hip DTI  - s/p limited course of antibx  - bilateral foot x-ray: no osteomyelitis, +osteoarthritis  - Wound care eval ; cont local wound care  - Podiatry eval ; no debridement recommended- cont local wound care   - nutritional support, cradle booties    #Failure to Thrive/weakness  #Dementia; severe   - Pt with poor po intake and profound weakness  - Continue home donepezil, sertraline   - SLP eval - recommended puree diet with thicken liquids   - Encourage PO intake  - Palliative met with family at bedside (Son and daughter) --pt is a DNR/DNI now, no feeding tube    #BPH  - Chronic condition  - Continue Flomax    #Pancreatic Insufficiency  - Chronic condition  - Continue home creon, prednisone  - As per wife pt on prednisone since 2011    #DVT ppx: lovenox    GOC:  DNR/DNI/ no feeding time . Palliative following  awaiting [facility] placement    Dispo: PT recommending [facility].   Palliative following.    7/31: Called son Jv at [phone] spoke with family and updated on plan of care

## 2022-07-31 NOTE — PROGRESS NOTE ADULT - NS ATTEND AMEND GEN_ALL_CORE FT
87 y/o M with PMH advanced dementia, pancreatic insufficiency, urinary incontinence, BPH, frequent falls (left hip fracture s/p ORIF Feb 2021), upper GIB secondary to duodenal ulcers (Feb 2021) presents from home with decreased oral intake and B/L heel wounds; found to be Covid 19 positive. No evidence of heel OM. completed short course of abx. Cont wound care, cont santyl. Poor PO intake/FTT: no feeding tube, no heroic measures. Dispo pending insurance verification per family. CM/SW appreciated. continue PT as tolerated. still awaiting information from family for dispo planning.

## 2022-08-01 PROCEDURE — 99232 SBSQ HOSP IP/OBS MODERATE 35: CPT

## 2022-08-01 PROCEDURE — 99233 SBSQ HOSP IP/OBS HIGH 50: CPT

## 2022-08-01 RX ADMIN — SERTRALINE 12.5 MILLIGRAM(S): 25 TABLET, FILM COATED ORAL at 12:39

## 2022-08-01 RX ADMIN — Medication 1 APPLICATION(S): at 12:38

## 2022-08-01 RX ADMIN — PANTOPRAZOLE SODIUM 40 MILLIGRAM(S): 20 TABLET, DELAYED RELEASE ORAL at 12:37

## 2022-08-01 RX ADMIN — Medication 6 CAPSULE(S): at 08:06

## 2022-08-01 RX ADMIN — Medication 1 MILLIGRAM(S): at 21:44

## 2022-08-01 RX ADMIN — SENNA PLUS 2 TABLET(S): 8.6 TABLET ORAL at 21:43

## 2022-08-01 RX ADMIN — Medication 500 MILLIGRAM(S): at 12:39

## 2022-08-01 RX ADMIN — DONEPEZIL HYDROCHLORIDE 5 MILLIGRAM(S): 10 TABLET, FILM COATED ORAL at 21:44

## 2022-08-01 RX ADMIN — Medication 6 CAPSULE(S): at 12:37

## 2022-08-01 RX ADMIN — ENOXAPARIN SODIUM 40 MILLIGRAM(S): 100 INJECTION SUBCUTANEOUS at 22:09

## 2022-08-01 RX ADMIN — Medication 6 CAPSULE(S): at 17:45

## 2022-08-01 NOTE — PROGRESS NOTE ADULT - SUBJECTIVE AND OBJECTIVE BOX
Patient is a 88y old  Male who presents with a chief complaint of Weakness       Patient seen and examined at bedside. Lethargic, unable to contribute to subjective history     ALLERGIES:  No Known Allergies    MEDICATIONS  (STANDING):  ascorbic acid 500 milliGRAM(s) Oral daily  collagenase Ointment 1 Application(s) Topical daily  collagenase Ointment 1 Application(s) Topical daily  dextrose 5%. 1000 milliLiter(s) (75 mL/Hr) IV Continuous <Continuous>  donepezil 5 milliGRAM(s) Oral at bedtime  doxazosin 1 milliGRAM(s) Oral at bedtime  enoxaparin Injectable 40 milliGRAM(s) SubCutaneous every 24 hours  pancrelipase  (CREON 12,000 Lipase Units) 6 Capsule(s) Oral three times a day with meals  pantoprazole   Suspension 40 milliGRAM(s) Oral daily  predniSONE   Tablet 2.5 milliGRAM(s) Oral daily  senna 2 Tablet(s) Oral at bedtime  sertraline 12.5 milliGRAM(s) Oral daily    MEDICATIONS  (PRN):  acetaminophen     Tablet .. 650 milliGRAM(s) Oral every 6 hours PRN Temp greater or equal to 38C (100.4F), Mild Pain (1 - 3)  aluminum hydroxide/magnesium hydroxide/simethicone Suspension 30 milliLiter(s) Oral every 4 hours PRN Dyspepsia  melatonin 3 milliGRAM(s) Oral at bedtime PRN Insomnia  ondansetron Injectable 4 milliGRAM(s) IV Push every 8 hours PRN Nausea and/or Vomiting    Vital Signs Last 24 Hrs  T(F): 97.6 (01 Aug 2022 05:31), Max: 98.4 (31 Jul 2022 19:55)  HR: 72 (01 Aug 2022 05:31) (72 - 90)  BP: 139/60 (01 Aug 2022 05:31) (128/64 - 139/60)  RR: 20 (01 Aug 2022 05:31) (17 - 20)  SpO2: 95% (01 Aug 2022 05:31) (95% - 97%)  I&O's Summary    PHYSICAL EXAM:  General: Ill appearing, lethargic,  ENT: dry MM, no oral thrush   Neck: Supple, No JVD  Lungs: Clear to auscultation bilaterally, non labored breathing  Cardio: RRR, S1/S2, No murmurs  Abdomen: Soft, Nontender, Nondistended; Bowel sounds present  Extremities: No calf tenderness, No pitting edema    LABS:                  RADIOLOGY & ADDITIONAL TESTS:    Care Discussed with Consultants/Other Providers:   Palliative Dr. Mahan

## 2022-08-01 NOTE — PROGRESS NOTE ADULT - NS ATTEND AMEND GEN_ALL_CORE FT
89 y/o M with PMH advanced dementia, pancreatic insufficiency, urinary incontinence, BPH, frequent falls (left hip fracture s/p ORIF Feb 2021), upper GIB secondary to duodenal ulcers (Feb 2021) presents from home with decreased oral intake and B/L heel wounds; found to be Covid 19 positive. No evidence of heel OM. completed short course of abx. Cont wound care, cont santyl. Poor PO intake/FTT: no feeding tube, no heroic measures. Dispo pending insurance verification per family. CM/SW appreciated. continue PT as tolerated. still awaiting information from family for dispo planning. palliative follow up appreciated.

## 2022-08-01 NOTE — PROGRESS NOTE ADULT - SUBJECTIVE AND OBJECTIVE BOX
Progress: comfortable    Present Symptoms:   Dyspnea: n  Nausea/Vomiting: n  Anxiety:  n  Depressed Mood: n  Fatigue:   Loss of appetite: eats with full assistance  Pain:    no apparent                 Review of Systems: Unable to obtain due to poor mentation    MEDICATIONS  (STANDING):  ascorbic acid 500 milliGRAM(s) Oral daily  collagenase Ointment 1 Application(s) Topical daily  collagenase Ointment 1 Application(s) Topical daily  dextrose 5%. 1000 milliLiter(s) (75 mL/Hr) IV Continuous <Continuous>  donepezil 5 milliGRAM(s) Oral at bedtime  doxazosin 1 milliGRAM(s) Oral at bedtime  enoxaparin Injectable 40 milliGRAM(s) SubCutaneous every 24 hours  pancrelipase  (CREON 12,000 Lipase Units) 6 Capsule(s) Oral three times a day with meals  pantoprazole   Suspension 40 milliGRAM(s) Oral daily  predniSONE   Tablet 2.5 milliGRAM(s) Oral daily  senna 2 Tablet(s) Oral at bedtime  sertraline 12.5 milliGRAM(s) Oral daily    MEDICATIONS  (PRN):  acetaminophen     Tablet .. 650 milliGRAM(s) Oral every 6 hours PRN Temp greater or equal to 38C (100.4F), Mild Pain (1 - 3)  aluminum hydroxide/magnesium hydroxide/simethicone Suspension 30 milliLiter(s) Oral every 4 hours PRN Dyspepsia  melatonin 3 milliGRAM(s) Oral at bedtime PRN Insomnia  ondansetron Injectable 4 milliGRAM(s) IV Push every 8 hours PRN Nausea and/or Vomiting      PHYSICAL EXAM:  Vital Signs Last 24 Hrs  T(C): 36.4 (01 Aug 2022 05:31), Max: 36.9 (31 Jul 2022 19:55)  T(F): 97.6 (01 Aug 2022 05:31), Max: 98.4 (31 Jul 2022 19:55)  HR: 72 (01 Aug 2022 05:31) (72 - 90)  BP: 139/60 (01 Aug 2022 05:31) (128/64 - 139/60)  BP(mean): --  RR: 20 (01 Aug 2022 05:31) (17 - 20)  SpO2: 95% (01 Aug 2022 05:31) (95% - 97%)    Parameters below as of 01 Aug 2022 05:31  Patient On (Oxygen Delivery Method): room air      General: alert  oriented x __1__      HEENT: normal    Lungs: comfortable   CV: normal    GI: normal    : normal    Musculoskeletal: normal   Skin: no significant improvement   Neuro: no deficits   Oral intake ability:  oral feeding  Diet: NPO,     LABS:                RADIOLOGY & ADDITIONAL STUDIES:    ADVANCE DIRECTIVES:  Advanced Care Planning discussion total time spent:

## 2022-08-01 NOTE — PROGRESS NOTE ADULT - ASSESSMENT
88 male with PMH dementia, pancreatic insufficiency, urinary incontinence, BPH, frequent falls (left hip fracture s/p ORIF Feb 2021), upper GIB/duodenal ulcers (dx'ed Feb 2021) presents from home with poor oral intake, weakness and B/L heel ulcers; found to be Covid 19 positive.   Palliative:  reviewed home hospice with family(wife and daughter).  They are not able to take him home.  He will need placement with eventual conversion to in patient hospice  #COVID 19- day 13--- off isolation   - CXR negative, currently does not have any respiratory symptoms  - Tolerating room air, O2 sat 96%  - received  Remdesivir 2 days   - Noted D-Dimer elevated - continue lovenox once daily due to hx GI bleed        #Bilateral Heel wounds  #Right heel, stage 3 pressure ulcer  #Left heel, DTI  #Right hip DTI  - s/p limited course of antibx  - bilateral foot x-ray: no osteomyelitis, +osteoarthritis  - Wound care eval ; cont local wound care  - Podiatry eval ; no debridement recommended- cont local wound care   - nutritional support, cradle booties    #Failure to Thrive/weakness  #Dementia; severe   - Pt with poor po intake and profound weakness  - Continue home donepezil, sertraline   - SLP eval - recommended puree diet with thicken liquids   - Encourage PO intake    -Not inpatient hospice appropriate.     #BPH  - Chronic condition  - Continue Flomax    #Pancreatic Insufficiency  - Chronic condition  - Continue home creon, prednisone  - As per wife pt on prednisone since 2011    #DVT ppx: lovenox    GOC:  DNR/DNI/ no feeding time . Palliative following  awaiting [facility] placement    Dispo: PT recommending [facility].   Palliative following.    Will update family at bedside.

## 2022-08-01 NOTE — PROGRESS NOTE ADULT - ASSESSMENT
88 male with PMH dementia, pancreatic insufficiency, urinary incontinence, BPH, frequent falls (left hip fracture s/p ORIF Feb 2021), upper GIB/duodenal ulcers (dx'ed Feb 2021) presents from home with poor oral intake, weakness and B/L heel ulcers; found to be Covid 19 positive.     #COVID 19- day 13--- off isolation   - CXR negative, currently does not have any respiratory symptoms  - Tolerating room air, O2 sat 96%  - received  Remdesivir 2 days   - Noted D-Dimer elevated - continue lovenox once daily due to hx GI bleed        #Bilateral Heel wounds  #Right heel, stage 3 pressure ulcer  #Left heel, DTI  #Right hip DTI  - s/p limited course of antibx  - bilateral foot x-ray: no osteomyelitis, +osteoarthritis  - Wound care eval ; cont local wound care  - Podiatry eval ; no debridement recommended- cont local wound care   - nutritional support, cradle booties    #Failure to Thrive/weakness  #Dementia; severe   - Pt with poor po intake and profound weakness  - Continue home donepezil, sertraline   - SLP eval - recommended puree diet with thicken liquids   - Encourage PO intake  - Palliative met with family at bedside (Son and daughter) --pt is a DNR/DNI now, no feeding tube.   -Not inpatient hospice appropriate.     #BPH  - Chronic condition  - Continue Flomax    #Pancreatic Insufficiency  - Chronic condition  - Continue home creon, prednisone  - As per wife pt on prednisone since 2011    #DVT ppx: lovenox    GOC:  DNR/DNI/ no feeding time . Palliative following  awaiting [facility] placement    Dispo: PT recommending [facility].   Palliative following.    Will update family at bedside.

## 2022-08-02 ENCOUNTER — TRANSCRIPTION ENCOUNTER (OUTPATIENT)
Age: 87
End: 2022-08-02

## 2022-08-02 VITALS
HEART RATE: 101 BPM | TEMPERATURE: 98 F | SYSTOLIC BLOOD PRESSURE: 118 MMHG | OXYGEN SATURATION: 95 % | DIASTOLIC BLOOD PRESSURE: 72 MMHG | RESPIRATION RATE: 15 BRPM

## 2022-08-02 LAB — SARS-COV-2 RNA SPEC QL NAA+PROBE: DETECTED

## 2022-08-02 PROCEDURE — 80053 COMPREHEN METABOLIC PANEL: CPT

## 2022-08-02 PROCEDURE — 85379 FIBRIN DEGRADATION QUANT: CPT

## 2022-08-02 PROCEDURE — 83605 ASSAY OF LACTIC ACID: CPT

## 2022-08-02 PROCEDURE — 87040 BLOOD CULTURE FOR BACTERIA: CPT

## 2022-08-02 PROCEDURE — 92610 EVALUATE SWALLOWING FUNCTION: CPT

## 2022-08-02 PROCEDURE — 80048 BASIC METABOLIC PNL TOTAL CA: CPT

## 2022-08-02 PROCEDURE — 84145 PROCALCITONIN (PCT): CPT

## 2022-08-02 PROCEDURE — 99233 SBSQ HOSP IP/OBS HIGH 50: CPT

## 2022-08-02 PROCEDURE — 99285 EMERGENCY DEPT VISIT HI MDM: CPT | Mod: 25

## 2022-08-02 PROCEDURE — 85027 COMPLETE CBC AUTOMATED: CPT

## 2022-08-02 PROCEDURE — 71045 X-RAY EXAM CHEST 1 VIEW: CPT

## 2022-08-02 PROCEDURE — 96375 TX/PRO/DX INJ NEW DRUG ADDON: CPT

## 2022-08-02 PROCEDURE — 97530 THERAPEUTIC ACTIVITIES: CPT

## 2022-08-02 PROCEDURE — 86140 C-REACTIVE PROTEIN: CPT

## 2022-08-02 PROCEDURE — 93005 ELECTROCARDIOGRAM TRACING: CPT

## 2022-08-02 PROCEDURE — 85610 PROTHROMBIN TIME: CPT

## 2022-08-02 PROCEDURE — 96374 THER/PROPH/DIAG INJ IV PUSH: CPT

## 2022-08-02 PROCEDURE — 97110 THERAPEUTIC EXERCISES: CPT

## 2022-08-02 PROCEDURE — 83735 ASSAY OF MAGNESIUM: CPT

## 2022-08-02 PROCEDURE — 99239 HOSP IP/OBS DSCHRG MGMT >30: CPT

## 2022-08-02 PROCEDURE — 87086 URINE CULTURE/COLONY COUNT: CPT

## 2022-08-02 PROCEDURE — 87635 SARS-COV-2 COVID-19 AMP PRB: CPT

## 2022-08-02 PROCEDURE — 97163 PT EVAL HIGH COMPLEX 45 MIN: CPT

## 2022-08-02 PROCEDURE — 73630 X-RAY EXAM OF FOOT: CPT

## 2022-08-02 PROCEDURE — 92526 ORAL FUNCTION THERAPY: CPT

## 2022-08-02 PROCEDURE — 82728 ASSAY OF FERRITIN: CPT

## 2022-08-02 PROCEDURE — 36415 COLL VENOUS BLD VENIPUNCTURE: CPT

## 2022-08-02 PROCEDURE — 0225U NFCT DS DNA&RNA 21 SARSCOV2: CPT

## 2022-08-02 PROCEDURE — 85025 COMPLETE CBC W/AUTO DIFF WBC: CPT

## 2022-08-02 PROCEDURE — 80076 HEPATIC FUNCTION PANEL: CPT

## 2022-08-02 PROCEDURE — 82565 ASSAY OF CREATININE: CPT

## 2022-08-02 PROCEDURE — 85730 THROMBOPLASTIN TIME PARTIAL: CPT

## 2022-08-02 PROCEDURE — 70450 CT HEAD/BRAIN W/O DYE: CPT | Mod: MA

## 2022-08-02 PROCEDURE — 81001 URINALYSIS AUTO W/SCOPE: CPT

## 2022-08-02 RX ORDER — COLLAGENASE CLOSTRIDIUM HIST. 250 UNIT/G
1 OINTMENT (GRAM) TOPICAL
Qty: 0 | Refills: 0 | DISCHARGE
Start: 2022-08-02

## 2022-08-02 RX ORDER — SERTRALINE 25 MG/1
0.5 TABLET, FILM COATED ORAL
Qty: 0 | Refills: 0 | DISCHARGE

## 2022-08-02 RX ORDER — POLYETHYLENE GLYCOL 3350 17 G/17G
17 POWDER, FOR SOLUTION ORAL DAILY
Refills: 0 | Status: DISCONTINUED | OUTPATIENT
Start: 2022-08-02 | End: 2022-08-02

## 2022-08-02 RX ADMIN — Medication 6 CAPSULE(S): at 08:03

## 2022-08-02 RX ADMIN — Medication 10 MILLIGRAM(S): at 14:39

## 2022-08-02 RX ADMIN — Medication 500 MILLIGRAM(S): at 11:22

## 2022-08-02 RX ADMIN — Medication 1 APPLICATION(S): at 11:22

## 2022-08-02 RX ADMIN — Medication 2.5 MILLIGRAM(S): at 06:18

## 2022-08-02 RX ADMIN — Medication 6 CAPSULE(S): at 12:46

## 2022-08-02 RX ADMIN — SERTRALINE 12.5 MILLIGRAM(S): 25 TABLET, FILM COATED ORAL at 11:22

## 2022-08-02 RX ADMIN — Medication 1 APPLICATION(S): at 11:23

## 2022-08-02 RX ADMIN — Medication 6 CAPSULE(S): at 17:11

## 2022-08-02 RX ADMIN — PANTOPRAZOLE SODIUM 40 MILLIGRAM(S): 20 TABLET, DELAYED RELEASE ORAL at 11:23

## 2022-08-02 NOTE — PROGRESS NOTE ADULT - PROVIDER SPECIALTY LIST ADULT
Hospitalist
Palliative Care
Hospitalist
Hospitalist
Palliative Care
Hospitalist
Palliative Care
Palliative Care
Hospitalist
Hospitalist

## 2022-08-02 NOTE — PROGRESS NOTE ADULT - SUBJECTIVE AND OBJECTIVE BOX
Patient is a 88y old  Male who presents with a chief complaint of ftt (01 Aug 2022 12:40)      Patient seen and examined at bedside. Pt alert, non verbal, appears in no distress. no overnight reported events. Unable to obtain subjective history     ALLERGIES:  No Known Allergies    MEDICATIONS  (STANDING):  ascorbic acid 500 milliGRAM(s) Oral daily  bisacodyl Suppository 10 milliGRAM(s) Rectal once  collagenase Ointment 1 Application(s) Topical daily  collagenase Ointment 1 Application(s) Topical daily  dextrose 5%. 1000 milliLiter(s) (75 mL/Hr) IV Continuous <Continuous>  donepezil 5 milliGRAM(s) Oral at bedtime  doxazosin 1 milliGRAM(s) Oral at bedtime  enoxaparin Injectable 40 milliGRAM(s) SubCutaneous every 24 hours  pancrelipase  (CREON 12,000 Lipase Units) 6 Capsule(s) Oral three times a day with meals  pantoprazole   Suspension 40 milliGRAM(s) Oral daily  predniSONE   Tablet 2.5 milliGRAM(s) Oral daily  senna 2 Tablet(s) Oral at bedtime  sertraline 12.5 milliGRAM(s) Oral daily    MEDICATIONS  (PRN):  acetaminophen     Tablet .. 650 milliGRAM(s) Oral every 6 hours PRN Temp greater or equal to 38C (100.4F), Mild Pain (1 - 3)  aluminum hydroxide/magnesium hydroxide/simethicone Suspension 30 milliLiter(s) Oral every 4 hours PRN Dyspepsia  melatonin 3 milliGRAM(s) Oral at bedtime PRN Insomnia  ondansetron Injectable 4 milliGRAM(s) IV Push every 8 hours PRN Nausea and/or Vomiting  polyethylene glycol 3350 17 Gram(s) Oral daily PRN Constipation    Vital Signs Last 24 Hrs  T(F): 98.2 (02 Aug 2022 05:56), Max: 98.2 (02 Aug 2022 05:56)  HR: 68 (02 Aug 2022 05:56) (68 - 80)  BP: 128/56 (02 Aug 2022 05:56) (128/56 - 148/72)  RR: 14 (02 Aug 2022 05:56) (14 - 20)  SpO2: 96% (02 Aug 2022 05:56) (96% - 97%)  I&O's Summary    01 Aug 2022 07:01  -  02 Aug 2022 07:00  --------------------------------------------------------  IN: 180 mL / OUT: 500 mL / NET: -320 mL      PHYSICAL EXAM:  General: NAD, Alert   ENT: dry MM, no oral thrush   Neck: Supple, No JVD  Lungs: Clear to auscultation bilaterally, non labored breathing  Cardio: RRR, S1/S2, No murmurs  Abdomen: Soft, Nontender, Nondistended; Bowel sounds present  Extremities: No calf tenderness, No pitting edema    LABS:                RADIOLOGY & ADDITIONAL TESTS:    Care Discussed with Consultants/Other Providers:

## 2022-08-02 NOTE — PROGRESS NOTE ADULT - ASSESSMENT
88 male with PMH dementia, pancreatic insufficiency, urinary incontinence, BPH, frequent falls (left hip fracture s/p ORIF Feb 2021), upper GIB/duodenal ulcers (dx'ed Feb 2021) presents from home with poor oral intake, weakness and B/L heel ulcers; found to be Covid 19 positive.     #COVID 19- day 14--- off isolation   - CXR negative, currently does not have any respiratory symptoms  - Tolerating room air  - received  Remdesivir 2 days   - Noted D-Dimer elevated - continue lovenox once daily due to hx GI bleed        #Bilateral Heel wounds  #Right heel, stage 3 pressure ulcer  #Left heel, DTI  #Right hip DTI  - s/p limited course of antibx  - bilateral foot x-ray: no osteomyelitis, +osteoarthritis  - Wound care eval ; cont local wound care  - Podiatry eval ; no debridement recommended- cont local wound care   - nutritional support, cradle booties    #Failure to Thrive/weakness  #Dementia; severe   - Pt with poor po intake and profound weakness  - Continue home donepezil, sertraline   - SLP eval - recommended puree diet with thicken liquids   - Encourage PO intake  - Palliative met with family at bedside (Son and daughter) --pt is a DNR/DNI, no feeding tube.   -Not inpatient hospice appropriate.     #BPH  - Chronic condition  - Continue Flomax    #Pancreatic Insufficiency  - Chronic condition  - Continue home creon, prednisone  - As per wife pt on prednisone since 2011    #DVT ppx: lovenox    GOC:  DNR/DNI/ no feeding time . Palliative following      Dispo: awaiting [facility] placement  PT recommending [facility].   Palliative following.    Updated spouse Kayla

## 2022-08-02 NOTE — PROGRESS NOTE ADULT - SUBJECTIVE AND OBJECTIVE BOX
Progress: stable    Present Symptoms: n  Dyspnea: n  Nausea/Vomiting: n  Anxiety:  n  Depressed Mood: n  Fatigue: n  Loss of appetite: y  Pain:      no apparent             MEDICATIONS  (STANDING):  ascorbic acid 500 milliGRAM(s) Oral daily  collagenase Ointment 1 Application(s) Topical daily  collagenase Ointment 1 Application(s) Topical daily  dextrose 5%. 1000 milliLiter(s) (75 mL/Hr) IV Continuous <Continuous>  donepezil 5 milliGRAM(s) Oral at bedtime  doxazosin 1 milliGRAM(s) Oral at bedtime  enoxaparin Injectable 40 milliGRAM(s) SubCutaneous every 24 hours  pancrelipase  (CREON 12,000 Lipase Units) 6 Capsule(s) Oral three times a day with meals  pantoprazole   Suspension 40 milliGRAM(s) Oral daily  predniSONE   Tablet 2.5 milliGRAM(s) Oral daily  senna 2 Tablet(s) Oral at bedtime  sertraline 12.5 milliGRAM(s) Oral daily    MEDICATIONS  (PRN):  acetaminophen     Tablet .. 650 milliGRAM(s) Oral every 6 hours PRN Temp greater or equal to 38C (100.4F), Mild Pain (1 - 3)  aluminum hydroxide/magnesium hydroxide/simethicone Suspension 30 milliLiter(s) Oral every 4 hours PRN Dyspepsia  melatonin 3 milliGRAM(s) Oral at bedtime PRN Insomnia  ondansetron Injectable 4 milliGRAM(s) IV Push every 8 hours PRN Nausea and/or Vomiting  polyethylene glycol 3350 17 Gram(s) Oral daily PRN Constipation      PHYSICAL EXAM:  Vital Signs Last 24 Hrs  T(C): 36.8 (02 Aug 2022 05:56), Max: 36.8 (02 Aug 2022 05:56)  T(F): 98.2 (02 Aug 2022 05:56), Max: 98.2 (02 Aug 2022 05:56)  HR: 68 (02 Aug 2022 05:56) (68 - 80)  BP: 128/56 (02 Aug 2022 05:56) (128/56 - 148/72)  BP(mean): --  RR: 14 (02 Aug 2022 05:56) (14 - 20)  SpO2: 96% (02 Aug 2022 05:56) (96% - 97%)    Parameters below as of 02 Aug 2022 05:56  Patient On (Oxygen Delivery Method): room air      General: alert  oriented x __1__      HEENT: normal    Lungs: comfortable   CV: normal    GI: normal    : normal    Musculoskeletal: normal   Skin: normal    Neuro: severe cognitive defect   Oral intake ability:  oral feeding  Diet: NPO,     LABS:                RADIOLOGY & ADDITIONAL STUDIES:    ADVANCE DIRECTIVES:  Advanced Care Planning discussion total time spent:

## 2022-08-02 NOTE — PROGRESS NOTE ADULT - NS ATTEND AMEND GEN_ALL_CORE FT
89 y/o M with PMH advanced dementia, pancreatic insufficiency, urinary incontinence, BPH, frequent falls (left hip fracture s/p ORIF Feb 2021), upper GIB secondary to duodenal ulcers (Feb 2021) presents from home with decreased oral intake and B/L heel wounds; found to be Covid 19 positive. No evidence of heel OM. completed short course of abx. Cont wound care, cont santyl. Poor PO intake/FTT: no feeding tube, no heroic measures. . CM/SW appreciated. continue PT as tolerated. dispo planning - [facility]

## 2022-08-02 NOTE — DISCHARGE NOTE NURSING/CASE MANAGEMENT/SOCIAL WORK - PATIENT PORTAL LINK FT
You can access the FollowMyHealth Patient Portal offered by [facility] by registering at the following website: [email or web address]. By joining [employer]’s FollowMyHealth portal, you will also be able to view your health information using other applications (apps) compatible with our system.

## 2022-08-02 NOTE — PROGRESS NOTE ADULT - NS ATTEND OPT1 GEN_ALL_CORE
I attest my time as attending is greater than 50% of the total combined time spent on qualifying patient care activities by the PA/NP and attending.
I independently performed the documented:
I attest my time as attending is greater than 50% of the total combined time spent on qualifying patient care activities by the PA/NP and attending.
I independently performed the documented:
I attest my time as attending is greater than 50% of the total combined time spent on qualifying patient care activities by the PA/NP and attending.
I independently performed the documented:
I independently performed the documented:
I attest my time as attending is greater than 50% of the total combined time spent on qualifying patient care activities by the PA/NP and attending.
I independently performed the documented:

## 2022-08-02 NOTE — PROGRESS NOTE ADULT - REASON FOR ADMISSION
ftt
heel ulcers, covid
weakness/FTT
Weakness
failure to thrive
ftt
weakness
covid
ftt/covid
heel wounds, COVID
weakness/FTT, COVID
ftt
weakness/ftt

## 2022-08-02 NOTE — PROGRESS NOTE ADULT - NUTRITIONAL ASSESSMENT
This patient has been assessed with a concern for Malnutrition and has been determined to have a diagnosis/diagnoses of Moderate protein-calorie malnutrition.    This patient is being managed with:   Diet Pureed-  Mildly Thick Liquids (MILDTHICKLIQS) No Straws  Single Sips Only  Entered: Jul 20 2022 10:29AM    

## 2022-08-02 NOTE — DISCHARGE NOTE NURSING/CASE MANAGEMENT/SOCIAL WORK - NSDCPEFALRISK_GEN_ALL_CORE
For information on Fall & Injury Prevention, visit: [email or web address] OR  [email or web address] OR  https://www.cdc.gov/steadi/patient.html

## 2022-08-02 NOTE — DISCHARGE NOTE NURSING/CASE MANAGEMENT/SOCIAL WORK - NSDCVIVACCINE_GEN_ALL_CORE_FT
Tdap; 20-Jan-2021 14:54; Olvin Pimentel (MALACHI); [employer]; [id number] (Exp. Date: 21-Jul-2022); IntraMuscular; Deltoid Left.; 0.5 milliLiter(s); VIS (VIS Published: 09-May-2013, VIS Presented: 20-Jan-2021);

## 2022-08-24 ENCOUNTER — NON-APPOINTMENT (OUTPATIENT)
Age: 87
End: 2022-08-24

## 2022-12-04 NOTE — DISCHARGE NOTE NURSING/CASE MANAGEMENT/SOCIAL WORK - HAS THE PATIENT RECEIVED THE INFLUENZA VACCINE THIS SEASON?
yes... I was directly involved in the care of this patient. PA Fellow Alfonso note/plan reviewed and agreed. Pain at this time.  Return precautions discussed with patient

## 2023-08-15 NOTE — DISCHARGE NOTE NURSING/CASE MANAGEMENT/SOCIAL WORK - NSDCVIVACCINE_GEN_ALL_CORE_FT
No Vaccines Administered. Spray Paint Technique: No Billing Information: Bill by Static Price Spray Paint Text: The liquid nitrogen was applied to the skin utilizing a spray paint frosting technique. Show Spray Paint Technique Variable?: Yes Post-Care Instructions: I reviewed with the patient in detail post-care instructions. Patient is to wear sunprotection, and avoid picking at any of the treated lesions. Pt may apply Vaseline to crusted or scabbing areas. Detail Level: Simple Price (Use Numbers Only, No Special Characters Or $): 0 Consent: The patient's consent was obtained including but not limited to risks of crusting, scabbing, blistering, scarring, darker or lighter pigmentary change, recurrence, incomplete removal and infection. The patient understands that the procedure is cosmetic in nature and is not covered by insurance. Billing Information: Bill by Static Price

## 2024-01-29 NOTE — PHYSICAL THERAPY INITIAL EVALUATION ADULT - ACTIVE RANGE OF MOTION EXAMINATION, REHAB EVAL
RUE not formally assessed due to NWB in sling/bilateral upper extremity Active ROM was WFL (within functional limits)/bilateral  lower extremity Active ROM was WFL (within functional limits) [Negative] : Endocrine

## 2024-11-17 NOTE — ED ADULT NURSE NOTE - TEMPLATE LIST FOR HEAD TO TOE ASSESSMENT
General ALPRAZolam 0.25 mg oral tablet: 1 tab(s) orally  clopidogrel 75 mg oral tablet: 1 tab(s) orally once a day  colchicine 0.6 mg oral capsule: 1 cap(s) orally  Januvia 50 mg oral tablet: 1 tab(s) orally  Jardiance 25 mg oral tablet: 1 tab(s) orally  metFORMIN 1000 mg oral tablet: 1 tab(s) orally  metoprolol succinate 25 mg oral tablet, extended release: 1 tab(s) orally once a day  Ozempic 2 mg/1.5 mL (0.25 mg or 0.5 mg dose) subcutaneous solution: 0.5 milligram(s) subcutaneously   ALPRAZolam 0.25 mg oral tablet: 1 tab(s) orally once a day as needed for  anxiety  Jesus Manuel Low Dose 81 mg oral delayed release tablet: 1 tab(s) orally once a day  clopidogrel 75 mg oral tablet: 1 tab(s) orally once a day  colchicine 0.6 mg oral capsule: 1 cap(s) orally once a day  Januvia 50 mg oral tablet: 1 tab(s) orally  Jardiance 25 mg oral tablet: 1 tab(s) orally  metFORMIN 1000 mg oral tablet: 1 tab(s) orally  metoprolol succinate 25 mg oral tablet, extended release: 1 tab(s) orally once a day  Ozempic 2 mg/1.5 mL (0.25 mg or 0.5 mg dose) subcutaneous solution: 0.5 milligram(s) subcutaneously   ALPRAZolam 0.25 mg oral tablet: 1 tab(s) orally once a day as needed for  anxiety  atorvastatin 80 mg oral tablet: 1 tab(s) orally once a day (at bedtime)  Jesus Manuel Low Dose 81 mg oral delayed release tablet: 1 tab(s) orally once a day  clopidogrel 75 mg oral tablet: 1 tab(s) orally once a day  colchicine 0.6 mg oral capsule: 1 cap(s) orally once a day  Januvia 50 mg oral tablet: 1 tab(s) orally  Jardiance 25 mg oral tablet: 1 tab(s) orally  metFORMIN 1000 mg oral tablet: 1 tab(s) orally  metoprolol succinate 25 mg oral tablet, extended release: 1 tab(s) orally once a day  Ozempic 2 mg/1.5 mL (0.25 mg or 0.5 mg dose) subcutaneous solution: 0.5 milligram(s) subcutaneously

## 2024-12-06 NOTE — ED ADULT NURSE NOTE - LANGUAGE ASSISTANCE NEEDED
[FreeTextEntry1] : Patient feeling well. No chest pain, SOB, palpitation. Has difficulty getting Entresto. Yes-Patient/Caregiver accepts free interpretation services...
